# Patient Record
Sex: FEMALE | Race: WHITE | Employment: PART TIME | URBAN - METROPOLITAN AREA
[De-identification: names, ages, dates, MRNs, and addresses within clinical notes are randomized per-mention and may not be internally consistent; named-entity substitution may affect disease eponyms.]

---

## 2017-04-28 ENCOUNTER — OFFICE VISIT (OUTPATIENT)
Dept: INTERNAL MEDICINE CLINIC | Age: 67
End: 2017-04-28

## 2017-04-28 VITALS
HEIGHT: 58 IN | DIASTOLIC BLOOD PRESSURE: 64 MMHG | SYSTOLIC BLOOD PRESSURE: 122 MMHG | TEMPERATURE: 98 F | RESPIRATION RATE: 16 BRPM | BODY MASS INDEX: 27.5 KG/M2 | OXYGEN SATURATION: 97 % | HEART RATE: 61 BPM | WEIGHT: 131 LBS

## 2017-04-28 DIAGNOSIS — I10 ESSENTIAL HYPERTENSION: ICD-10-CM

## 2017-04-28 DIAGNOSIS — Z79.4 TYPE 2 DIABETES MELLITUS WITHOUT COMPLICATION, WITH LONG-TERM CURRENT USE OF INSULIN (HCC): ICD-10-CM

## 2017-04-28 DIAGNOSIS — E11.9 TYPE 2 DIABETES MELLITUS WITHOUT COMPLICATION, WITH LONG-TERM CURRENT USE OF INSULIN (HCC): ICD-10-CM

## 2017-04-28 DIAGNOSIS — Z71.89 ADVANCE CARE PLANNING: ICD-10-CM

## 2017-04-28 DIAGNOSIS — E78.2 MIXED HYPERLIPIDEMIA: ICD-10-CM

## 2017-04-28 DIAGNOSIS — E55.9 VITAMIN D DEFICIENCY: ICD-10-CM

## 2017-04-28 DIAGNOSIS — Z00.00 MEDICARE WELCOME VISIT: Primary | ICD-10-CM

## 2017-04-28 DIAGNOSIS — Z11.59 NEED FOR HEPATITIS C SCREENING TEST: ICD-10-CM

## 2017-04-28 DIAGNOSIS — M15.9 PRIMARY OSTEOARTHRITIS INVOLVING MULTIPLE JOINTS: ICD-10-CM

## 2017-04-28 DIAGNOSIS — J30.2 SEASONAL ALLERGIC RHINITIS, UNSPECIFIED ALLERGIC RHINITIS TRIGGER: ICD-10-CM

## 2017-04-28 PROBLEM — J30.9 ALLERGIC RHINITIS: Status: ACTIVE | Noted: 2017-04-28

## 2017-04-28 RX ORDER — LANCETS
EACH MISCELLANEOUS
Qty: 200 EACH | Refills: 3 | Status: SHIPPED | OUTPATIENT
Start: 2017-04-28 | End: 2018-02-21 | Stop reason: SDUPTHER

## 2017-04-28 RX ORDER — ASPIRIN 325 MG
325 TABLET ORAL DAILY
Qty: 30 TAB | Refills: 5 | Status: SHIPPED | OUTPATIENT
Start: 2017-04-28 | End: 2019-07-31

## 2017-04-28 RX ORDER — CETIRIZINE HCL 10 MG
TABLET ORAL
COMMUNITY
End: 2017-04-28 | Stop reason: SDUPTHER

## 2017-04-28 RX ORDER — ASPIRIN 325 MG
325 TABLET ORAL DAILY
COMMUNITY
End: 2017-04-28 | Stop reason: SDUPTHER

## 2017-04-28 RX ORDER — BLOOD SUGAR DIAGNOSTIC
STRIP MISCELLANEOUS
COMMUNITY
Start: 2017-03-28 | End: 2017-04-28 | Stop reason: SDUPTHER

## 2017-04-28 RX ORDER — LOSARTAN POTASSIUM 50 MG/1
TABLET ORAL
Refills: 2 | COMMUNITY
Start: 2017-03-20 | End: 2017-04-28 | Stop reason: SDUPTHER

## 2017-04-28 RX ORDER — LOSARTAN POTASSIUM 50 MG/1
TABLET ORAL
Qty: 30 TAB | Refills: 5 | Status: SHIPPED | OUTPATIENT
Start: 2017-04-28 | End: 2017-04-28 | Stop reason: SDUPTHER

## 2017-04-28 RX ORDER — BLOOD SUGAR DIAGNOSTIC
STRIP MISCELLANEOUS
Qty: 200 STRIP | Refills: 3 | Status: SHIPPED | OUTPATIENT
Start: 2017-04-28 | End: 2018-03-07 | Stop reason: DRUGHIGH

## 2017-04-28 RX ORDER — MELOXICAM 7.5 MG/1
7.5 TABLET ORAL DAILY
Qty: 90 TAB | Refills: 0 | Status: SHIPPED | OUTPATIENT
Start: 2017-04-28 | End: 2017-05-04 | Stop reason: ALTCHOICE

## 2017-04-28 RX ORDER — CETIRIZINE HCL 10 MG
10 TABLET ORAL DAILY
Qty: 30 TAB | Refills: 5 | Status: SHIPPED | OUTPATIENT
Start: 2017-04-28 | End: 2017-04-28 | Stop reason: SDUPTHER

## 2017-04-28 RX ORDER — ROSUVASTATIN CALCIUM 20 MG/1
20 TABLET, COATED ORAL
COMMUNITY
End: 2017-04-28 | Stop reason: SDUPTHER

## 2017-04-28 RX ORDER — LOSARTAN POTASSIUM 50 MG/1
TABLET ORAL
Qty: 90 TAB | Refills: 3 | Status: SHIPPED | OUTPATIENT
Start: 2017-04-28 | End: 2017-08-16 | Stop reason: SDUPTHER

## 2017-04-28 RX ORDER — ROSUVASTATIN CALCIUM 20 MG/1
20 TABLET, COATED ORAL
Qty: 90 TAB | Refills: 3 | Status: SHIPPED | OUTPATIENT
Start: 2017-04-28 | End: 2018-04-23 | Stop reason: SDUPTHER

## 2017-04-28 RX ORDER — LANCETS
EACH MISCELLANEOUS
COMMUNITY
Start: 2017-03-28 | End: 2017-04-28 | Stop reason: SDUPTHER

## 2017-04-28 RX ORDER — SYRINGE-NEEDLE,INSULIN,0.5 ML 27GX1/2"
SYRINGE, EMPTY DISPOSABLE MISCELLANEOUS
Qty: 200 SYRINGE | Refills: 3 | Status: SHIPPED | OUTPATIENT
Start: 2017-04-28 | End: 2017-05-04 | Stop reason: SDUPTHER

## 2017-04-28 RX ORDER — LANCETS
EACH MISCELLANEOUS
Qty: 100 EACH | Refills: 5 | Status: SHIPPED | OUTPATIENT
Start: 2017-04-28 | End: 2017-04-28 | Stop reason: SDUPTHER

## 2017-04-28 RX ORDER — ROSUVASTATIN CALCIUM 20 MG/1
20 TABLET, COATED ORAL
Qty: 30 TAB | Refills: 5 | Status: SHIPPED | OUTPATIENT
Start: 2017-04-28 | End: 2017-04-28 | Stop reason: SDUPTHER

## 2017-04-28 RX ORDER — CETIRIZINE HCL 10 MG
10 TABLET ORAL DAILY
Qty: 90 TAB | Refills: 3 | Status: SHIPPED | OUTPATIENT
Start: 2017-04-28 | End: 2017-11-01 | Stop reason: SDUPTHER

## 2017-04-28 RX ORDER — BLOOD SUGAR DIAGNOSTIC
STRIP MISCELLANEOUS
Qty: 100 STRIP | Refills: 5 | Status: SHIPPED | OUTPATIENT
Start: 2017-04-28 | End: 2017-04-28 | Stop reason: SDUPTHER

## 2017-04-28 RX ORDER — BLOOD-GLUCOSE METER
EACH MISCELLANEOUS
COMMUNITY
Start: 2017-03-28 | End: 2020-10-21

## 2017-04-28 NOTE — PROGRESS NOTES
Reviewed record  In preparation for visit and have obtained necessary documentation. 1. Have you been to the ER, urgent care clinic since your last visit? Hospitalized since your last visit?no  2. Have you seen or consulted any other health care providers outside of the [address] [place] [address] since your last visit? Include any pap smears or colon screening. Previously saw Dr Maria De Jesus Orellana  Patient does not currently have advance directives. Patient given information on advance directives and brochure and printed blank advance directive form made available to patient. Patient is also asked to make copy of directives available to this office for our/[facility] records once advanced directives are completed. Declines FIT test/colon,PCV13,zoster, mammogram and does not get the flu shot. Last eye exam with Pillo Coronado the [facility].

## 2017-04-28 NOTE — PATIENT INSTRUCTIONS
Schedule of Personalized Health Plan    The best way to stay healthy is to live a healthy lifestyle. A healthy lifestyle includes regular exercise, eating a well-balanced diet, keeping a healthy weight and not smoking. Regular physical exams and screening tests are another important way to take care of yourself. Preventive exams provided by health care providers can find health problems early when treatment works best and can keep you from getting certain diseases or illnesses. Preventive services include exams, lab tests, screenings, shots, monitoring and information to help you take care of your own health. All people over 65 should have a pneumonia shot. Pneumonia shots are usually only needed once in a lifetime unless your doctor decides differently. All people over 65 should have a yearly flu shot. People over 65 are at medium to high risk for Hepatitis B. Three shots are needed for complete protection. In addition to your physical exam, some screening tests are recommended:    Bone mass measurement (dexa scan) is recommended every two years  Diabetes Mellitus screening is recommended every year. Glaucoma is an eye disease caused by high pressure in the eye. An eye exam is recommended every year. Cardiovascular screening tests that check your cholesterol and other blood fat (lipid) levels are recommended every five years. Colorectal Cancer screening tests help to find pre-cancerous polyps (growths in the colon) so they can be removed before they turn into cancer. Tests ordered for screening depend on your personal and family history risk factors.     Screening for Breast Cancer is recommended yearly with a mammogram.    Screening for Cervical Cancer is recommended every two years (annually for certain risk factors, such as previous history of STD or abnormal PAP in past 7 years), with a Pelvic Exam with PAP    Here is a list of your current Health Maintenance items with a due date:  Health Maintenance   Topic Date Due    Hepatitis C Screening  1950    BREAST CANCER SCRN MAMMOGRAM  09/26/2001    FOBT Q 1 YEAR AGE 50-75  09/26/2001    GLAUCOMA SCREENING Q2Y  09/26/2016    OSTEOPOROSIS SCREENING (DEXA)  09/26/2016    Pneumococcal 65+ Low/Medium Risk (1 of 2 - PCV13) 09/26/2016    MEDICARE YEARLY EXAM  09/26/2016    DTaP/Tdap/Td series (2 - Td) 04/27/2027    ZOSTER VACCINE AGE 60>  Addressed    INFLUENZA AGE 9 TO ADULT  Addressed

## 2017-04-28 NOTE — PROGRESS NOTES
This is a \"Welcome to [employer]"  Initial Preventive Physical Examination (IPPE) providing Personalized Prevention Plan Services (Performed in the first 12 months of enrollment)    I have reviewed the patient's medical history in detail and updated the computerized patient record. History   Francis Chou is a 73 y.o. female. Presents to [facility] and for [facility] Annual Wellness Visit. Previous PCP was Dr. Sergio Limon; changed due to insurance. Seen also at [facility] the [facility]. She has insulin-dependent type 2 DM, HTN, hyperlipidemia, and seasonal allergic rhinitis. She complains of pain all over, especially at hands, arms, shoulders, low back, legs, knees, feet. Has painful  hammertoes and bunions. Denies numbness, tingling, or burning at feet or hands. She was prescribed ibuprofen 600 mg but was scared to take it due after she read about the side effects. Feels a little sad because her brother is in the hospital in [place] for CHF. Needs refills on all her medications to be sent to [facility]. States she has to change to Novolin due to formulary change. Endocrine Review  She is seen for diabetes. Was diagnosed in 1976. Reports her last A1c in 1/17 was about 8%. Since last visit she reports no polyuria or polydipsia, infrequent hypoglycemia. Home glucose monitoring: is performed regularly. Checks BS 2 times a day. Brought in BS log. She reports medication compliance: compliant most of the time. Medication side effects: none. Diabetic diet compliance: compliant most of the time. Lab review: orders written for new lab studies as appropriate; see orders. Eye exam: no.      Cardiovascular Review  The patient has hypertension and hyperlipidemia. She reports taking medications as instructed, no medication side effects noted.   Diet and Lifestyle: generally follows a low fat low cholesterol diet, generally follows a low sodium diet, stays active in the day but no formal exercise. Lab review: orders written for new lab studies as appropriate; see orders. Soc Hx  . Has 1 son; no grandchildren. Lives with her son in a trailer home. Works at [employer]. Usually works on Wednesdays, Saturdays, and Sundays. Does not drive; walks to work. Never smoker. Denies alcohol or recreational drug use. Drinks a cup of coffee 2 times a day.     Health Maintenance  Flu vaccine: made her sick before; declined     Pneumonia vaccine: states she had long time ago; declined    Tetanus vaccine: unknown; no indicated     Zoster vaccine: never had; declined  Pap smear: unknown; declined  Colonoscopy: due for this; declined but will discuss with her at next clinic visit  Mammogram: unknown; due for this  Bone density:  due for this  Eye exam: Melissa Leung in 1/17, got new glasses  Foot exam: due for this  Lipids: will check today (fasting)  A1c: will check today  Advanced Directives: dicussed, given information  End of Life: discussed, given information    ROS  Constitutional: negative for fevers, chills, night sweats  ENT:   negative for sore throat, nasal congestion, ear pains  Respiratory:  negative for cough, dyspnea  CV:   negative for chest pain, palpitations, lower extremity edema  GI:   negative for heartburn, abd pain, nausea, vomiting, diarrhea, constipation  Genitourinary: negative for frequency, dysuria and hematuria  Integument:  negative for rash and pruritus  Musculoskel: negative for muscle weakness  Neurological:  negative for headaches, dizziness, gait problems  Behavl/Psych: negative for feelings of anxiety, depression, mood change    Past Medical History:   Diagnosis Date    Arthritis     Diabetes ([facility] [address]     Hypercholesterolemia     Hypertension       Past Surgical History:   Procedure Laterality Date    EYE SURG ANT SGMT PROC UNLISTED      HX TUBAL LIGATION       Current Outpatient Prescriptions   Medication Sig Dispense Refill    ACCU-CHEK SOFTCLIX LANCETS misc Use to test blood sugar twice a day for Dx: E11.9      ACCU-CHEK CAROL PLUS METER misc       losartan (COZAAR) 50 mg tablet TAKE 1 TABLET EVERY DAY  2    ACCU-CHEK CAROL PLUS TEST STRP strip Testing blood sugar twice a day Dx E11.9      aspirin (ASPIRIN) 325 mg tablet Take 325 mg by mouth daily.  rosuvastatin (CRESTOR) 20 mg tablet Take 20 mg by mouth nightly.  cetirizine (ZYRTEC) 10 mg tablet Take  by mouth.  insulin NPH/insulin regular (NOVOLIN 70/30, HUMULIN 70/30) 100 unit/mL (70-30) injection by SubCUTAneous route. Sliding scale:  Am:  BS 50-99 16 units,  -299 20 units,   or > 22 units  PM:  50-99 12 units,  100-299 16 units,  300 or > 180 units      SYRINGE-NEEDLE,INSULIN,0.5 ML (B-D INSULIN SYRINGE LO-DOSE) by Does Not Apply route. Use to inject insulin twice a day for      insulin nph-regular human rec (HUMULIN 70/30 PEN) 100 unit/mL (70-30) flexpen by SubCUTAneous route.  olmesartan (BENICAR) 40 mg tablet Take  by mouth daily.  atorvastatin (LIPITOR) 40 mg tablet Take  by mouth daily.        Allergies   Allergen Reactions    Compazine [Prochlorperazine Edisylate] Other (comments)     Tongue swelling, drooling     Family History   Problem Relation Age of Onset    Cancer Mother     Diabetes Sister     Heart Disease Sister     Diabetes Brother     Heart Disease Brother     Diabetes Maternal Grandmother     Diabetes Paternal Grandmother      Social History   Substance Use Topics    Smoking status: Never Smoker    Smokeless tobacco: Not on file    Alcohol use No     Diet, Lifestyle: generally follows a low fat low cholesterol diet, generally follows a low sodium diet    Exercise level: moderately active, does much walking    Depression Risk Screen     PHQ 2 / 9, over the last two weeks 4/28/2017   Little interest or pleasure in doing things Not at all   Feeling down, depressed or hopeless Several days   Total Score PHQ 2 1     Alcohol Risk Screen   On any occasion during the past 3 months, have you had more than 3 drinks containing alcohol? No    Do you average more than 7 drinks per week? No    Functional Ability and Level of Safety     Hearing Loss   normal-to-mild    Activities of Daily Living   Self-care     Fall Risk Screen     Fall Risk Assessment, last 12 mths 4/28/2017   Able to walk? Yes   Fall in past 12 months? No     Abuse Screen   Patient is not abused    Review of Systems   Pertinent items are noted in HPI. Physical Examination     Visit Vitals    /64    Pulse 61    Temp 98 °F (36.7 °C) (Oral)    Resp 16    Ht 4' 10\" (1.473 m)    Wt 131 lb (59.4 kg)    SpO2 97%    BMI 27.38 kg/m2       General: Well-developed and well-nourished, no distress. HEENT:  Head normocephalic/atraumatic, no scleral icterus  Neck: Supple. No carotid bruits, JVD, lymphadenopathy, or thyromegaly. Lungs:  Clear to ausculation bilaterally. Good air movement. Heart:  Regular rate and rhythm, normal S1 and S2, no murmur, gallop, or rub  Abdomen: Soft, non-distended, normal bowel sounds, tenderness at epigastrium, no guarding, masses, rebound tenderness, or HSM. Extremities: No clubbing, cyanosis, or edema. Normal ROM at both shoulder and knees; mild crepitus at both knees (R > L)  Neurological: Alert and oriented. Non-focal exam.  Psychiatric: Normal mood and affect. Behavior is normal.         Patient Care Team:  Sharmin Red MD as PCP - General (Internal Medicine)     End-of-life planning  Advanced Directive discussed and documented: YES    Assessment/Plan   Education and counseling provided:  Are appropriate based on today's review and evaluation  End-of-Life planning (with patient's consent)  Pneumococcal Vaccine  Screening Mammography  Screening Pap and pelvic (covered once every 2 years)  Colorectal cancer screening tests  Cardiovascular screening blood test  Bone mass measurement (DEXA)  Diabetes screening test         ICD-10-CM ICD-9-CM    1.  Medicare welcome visit Z00.00 V70.0    2. Type 2 diabetes mellitus without complication, with long-term current use of insulin (HCC) E11.9 250.00 HEMOGLOBIN A1C WITH EAG    Z79.4 V58.67 ACCU-CHEK SOFTCLIX LANCETS misc      ACCU-CHEK CAROL PLUS TEST STRP strip      aspirin (ASPIRIN) 325 mg tablet      insulin NPH/insulin regular (NOVOLIN 70/30, HUMULIN 70/30) 100 unit/mL (70-30) injection   3. Essential hypertension N56 638.3 METABOLIC PANEL, COMPREHENSIVE      CBC WITH AUTOMATED DIFF      losartan (COZAAR) 50 mg tablet   4. Mixed hyperlipidemia E78.2 272.2 LIPID PANEL      TSH AND FREE T4      rosuvastatin (CRESTOR) 20 mg tablet   5. Seasonal allergic rhinitis, unspecified allergic rhinitis trigger J30.2 477.9 cetirizine (ZYRTEC) 10 mg tablet   6. Primary osteoarthritis involving multiple joints M15.0 715.09 meloxicam (MOBIC) 7.5 mg tablet   7. Vitamin D deficiency E55.9 268.9 VITAMIN D, 25 HYDROXY   8. Need for hepatitis C screening test Z11.59 V73.89 HEPATITIS C AB   9. Advance care planning Z71.89 V65.49        Jenaro Young was seen today to establish care and for [facility] Visit. She declined all immunizations. Diagnoses and all orders for this visit:    Medicare welcome visit  Medical records from previous PCP's/clinics will be requested. Type 2 diabetes mellitus without complication, with long-term current use of insulin (HCC)  -     HEMOGLOBIN A1C WITH EAG  -     ACCU-CHEK SOFTCLIX LANCETS misc; Use to test blood sugar twice a day for Dx: E11.9  -     ACCU-CHEK CAROL PLUS TEST STRP strip; Testing blood sugar twice a day Dx E11.9  -     Refill aspirin (ASPIRIN) 325 mg tablet; Take 1 Tab by mouth daily.   -     Refill insulin NPH/insulin regular (NOVOLIN 70/30, HUMULIN 70/30) 100 unit/mL (70-30) injection; Sliding scale:  Am:  BS 50-99 16 units,  -299 20 units,   or > 22 units  PM:  50-99 12 units,  100-299 16 units,  300 or > 180 units    Essential hypertension  - METABOLIC PANEL, COMPREHENSIVE  -     CBC WITH AUTOMATED DIFF  -     Refill losartan (COZAAR) 50 mg tablet; TAKE 1 TABLET EVERY DAY  Indications: hypertension    Mixed hyperlipidemia  -     LIPID PANEL  -     TSH AND FREE T4  -     Refill rosuvastatin (CRESTOR) 20 mg tablet; Take 1 Tab by mouth nightly. Indications: hyperlipidemia    Seasonal allergic rhinitis, unspecified allergic rhinitis trigger  -     Refill cetirizine (ZYRTEC) 10 mg tablet; Take 1 Tab by mouth daily. Indications: ALLERGIC RHINITIS    Primary osteoarthritis involving multiple joints  -     Start meloxicam (MOBIC) 7.5 mg tablet; Take 1 Tab by mouth daily. Take with food. Indications: OSTEOARTHRITIS    Vitamin D deficiency  -     VITAMIN D, 25 HYDROXY    Need for hepatitis C screening test  -     HEPATITIS C AB    Advance care planning    Greater than 40 mins direct face-to-face time spent with patient. Greater than 50% of time spent on counseling and coordination of care. Follow-up Disposition:  Return in about 1 month (around 5/28/2017), or if symptoms worsen or fail to improve, for DM, OA. .    I have discussed the diagnosis with the patient and the intended plan as seen in the above orders. Patient is in agreement. The patient has received an after-visit summary and questions were answered concerning future plans. I have discussed medication side effects and warnings with the patient as well.

## 2017-04-28 NOTE — MR AVS SNAPSHOT
Visit Information Date & Time Provider Department Dept. Phone Encounter #  
 4/28/2017  8:30 AM Morenita Poon Jackie Camara [phone] [id number] Follow-up Instructions Return in about 1 month (around 5/28/2017), or if symptoms worsen or fail to improve, for DM, OA. Upcoming Health Maintenance Date Due Hepatitis C Screening 1950 BREAST CANCER SCRN MAMMOGRAM 9/26/2001 FOBT Q 1 YEAR AGE 50-75 9/26/2001 GLAUCOMA SCREENING Q2Y 9/26/2016 OSTEOPOROSIS SCREENING (DEXA) 9/26/2016 Pneumococcal 65+ Low/Medium Risk (1 of 2 - PCV13) 9/26/2016 MEDICARE YEARLY EXAM 9/26/2016 DTaP/Tdap/Td series (2 - Td) 4/27/2027 Allergies as of 4/28/2017  Review Complete On: 4/28/2017 By: Morenita Poon MD  
  
 Severity Noted Reaction Type Reactions Compazine [Prochlorperazine Edisylate]  04/12/2010    Other (comments) Tongue swelling, drooling Current Immunizations  Reviewed on 4/13/2010 Name Date  
 TD Vaccine 4/13/2010  2:21 AM  
  
 Not reviewed this visit You Were Diagnosed With   
  
 Codes Comments Medicare welcome visit    -  Primary ICD-10-CM: Z00.00 ICD-9-CM: V70.0 Type 2 diabetes mellitus without complication, with long-term current use of insulin (HCC)     ICD-10-CM: E11.9, Z79.4 ICD-9-CM: 250.00, V58.67 Essential hypertension     ICD-10-CM: I10 
ICD-9-CM: 401.9 Mixed hyperlipidemia     ICD-10-CM: E78.2 ICD-9-CM: 272.2 Seasonal allergic rhinitis, unspecified allergic rhinitis trigger     ICD-10-CM: J30.2 ICD-9-CM: 477.9 Primary osteoarthritis involving multiple joints     ICD-10-CM: M15.0 ICD-9-CM: 715.09 Vitamin D deficiency     ICD-10-CM: E55.9 ICD-9-CM: 268.9 Need for hepatitis C screening test     ICD-10-CM: Z11.59 
ICD-9-CM: V73.89 Advance care planning     ICD-10-CM: Z71.89 ICD-9-CM: V65.49 Vitals BP Pulse Temp Resp Height(growth percentile) Weight(growth percentile) 122/64 61 98 °F (36.7 °C) (Oral) 16 4' 10\" (1.473 m) 131 lb (59.4 kg) SpO2 BMI OB Status Smoking Status 97% 27.38 kg/m2 Postmenopausal Never Smoker Vitals History BMI and BSA Data Body Mass Index Body Surface Area  
 27.38 kg/m 2 1.56 m 2 Preferred Pharmacy Pharmacy Name Phone [id number] [address] [place] [address] [phone] Your Updated Medication List  
  
   
This list is accurate as of: 4/28/17  9:28 AM.  Always use your most recent med list.  
  
  
  
  
 [address] Generic drug:  Blood-Glucose Meter ACCU-CHEK CAROL PLUS TEST STRP strip Generic drug:  glucose blood VI test strips Testing blood sugar twice a day Dx E11.9 [address] Generic drug:  Lancets Use to test blood sugar twice a day for Dx: E11.9  
  
 aspirin 325 mg tablet Commonly known as:  ASPIRIN Take 325 mg by mouth daily. B-D INSULIN SYRINGE LO-DOSE  
by Does Not Apply route. Use to inject insulin twice a day for BENICAR 40 mg tablet Generic drug:  olmesartan Take  by mouth daily. cetirizine 10 mg tablet Commonly known as:  ZYRTEC Take  by mouth. CRESTOR 20 mg tablet Generic drug:  rosuvastatin Take 20 mg by mouth nightly. * HumuLIN 70/30 Pen 100 unit/mL (70-30) Inpn Generic drug:  insulin nph-regular human rec  
by SubCUTAneous route. * insulin NPH/insulin regular 100 unit/mL (70-30) injection Commonly known as:  NOVOLIN 70/30, HUMULIN 70/30  
by SubCUTAneous route. Sliding scale: Am: BS 50-99 16 units, -299 20 units,  or > 22 units PM: 50-99 12 units, 100-299 16 units, 300 or > 180 units LIPITOR 40 mg tablet Generic drug:  atorvastatin Take  by mouth daily. losartan 50 mg tablet Commonly known as:  COZAAR  
TAKE 1 TABLET EVERY DAY  
  
 * Notice:   This list has 2 medication(s) that are the same as other medications prescribed for you. Read the directions carefully, and ask your doctor or other care provider to review them with you. We Performed the Following CBC WITH AUTOMATED DIFF [39161 CPT(R)] HEMOGLOBIN A1C WITH EAG [91680 CPT(R)] HEPATITIS C AB [29958 CPT(R)] LIPID PANEL [06582 CPT(R)] METABOLIC PANEL, COMPREHENSIVE [79070 CPT(R)] TSH AND FREE T4 [39398 CPT(R)] VITAMIN D, 25 HYDROXY [id number] CPT(R)] Follow-up Instructions Return in about 1 month (around 5/28/2017), or if symptoms worsen or fail to improve, for DM, OA. Patient Instructions Schedule of Personalized Health Plan The best way to stay healthy is to live a healthy lifestyle. A healthy lifestyle includes regular exercise, eating a well-balanced diet, keeping a healthy weight and not smoking. Regular physical exams and screening tests are another important way to take care of yourself. Preventive exams provided by health care providers can find health problems early when treatment works best and can keep you from getting certain diseases or illnesses. Preventive services include exams, lab tests, screenings, shots, monitoring and information to help you take care of your own health. All people over 65 should have a pneumonia shot. Pneumonia shots are usually only needed once in a lifetime unless your doctor decides differently. All people over 65 should have a yearly flu shot. People over 65 are at medium to high risk for Hepatitis B. Three shots are needed for complete protection. In addition to your physical exam, some screening tests are recommended: 
 
Bone mass measurement (dexa scan) is recommended every two years Diabetes Mellitus screening is recommended every year. Glaucoma is an eye disease caused by high pressure in the eye. An eye exam is recommended every year.   
 
Cardiovascular screening tests that check your cholesterol and other blood fat (lipid) levels are recommended every five years. Colorectal Cancer screening tests help to find pre-cancerous polyps (growths in the colon) so they can be removed before they turn into cancer. Tests ordered for screening depend on your personal and family history risk factors. Screening for Breast Cancer is recommended yearly with a mammogram. 
 
Screening for Cervical Cancer is recommended every two years (annually for certain risk factors, such as previous history of STD or abnormal PAP in past 7 years), with a Pelvic Exam with PAP Here is a list of your current Health Maintenance items with a due date: 
Health Maintenance Topic Date Due  
 Hepatitis C Screening  1950  BREAST CANCER SCRN MAMMOGRAM  09/26/2001  
 FOBT Q 1 YEAR AGE 50-75  09/26/2001  GLAUCOMA SCREENING Q2Y  09/26/2016  
 OSTEOPOROSIS SCREENING (DEXA)  09/26/2016  Pneumococcal 65+ Low/Medium Risk (1 of 2 - PCV13) 09/26/2016  MEDICARE YEARLY EXAM  09/26/2016  
 DTaP/Tdap/Td series (2 - Td) 04/27/2027  ZOSTER VACCINE AGE 60>  Addressed  INFLUENZA AGE 9 TO ADULT  Addressed Introducing [facility]! [facility] introduces Stamped patient portal. Now you can access parts of your medical record, email your doctor's office, and request medication refills online. 1. In your internet browser, go to [email or web address] "[email or web address] 2. Click on the First Time User? Click Here link in the Sign In box. You will see the New Member Sign Up page. 3. Enter your Stamped Access Code exactly as it appears below. You will not need to use this code after youve completed the sign-up process. If you do not sign up before the expiration date, you must request a new code. · Stamped Access Code: [email or web address] Expires: 7/27/2017  9:28 AM 
 
4. Enter the last four digits of your Social Security Number (xxxx) and Date of Birth (mm/dd/yyyy) as indicated and click Submit.  You will be taken to the next sign-up page. 5. Create a [employer] ID. This will be your [employer] login ID and cannot be changed, so think of one that is secure and easy to remember. 6. Create a [employer] password. You can change your password at any time. 7. Enter your Password Reset Question and Answer. This can be used at a later time if you forget your password. 8. Enter your e-mail address. You will receive e-mail notification when new information is available in [address]. 9. Click Sign Up. You can now view and download portions of your medical record. 10. Click the Download Summary menu link to download a portable copy of your medical information. If you have questions, please visit the Frequently Asked Questions section of the [employer] website. Remember, [employer] is NOT to be used for urgent needs. For medical emergencies, dial 911. Now available from your iPhone and Android! Please provide this summary of care documentation to your next provider. Your primary care clinician is listed as Lorenzo Albright. If you have any questions after today's visit, please call [phone].

## 2017-04-28 NOTE — ACP (ADVANCE CARE PLANNING)

## 2017-04-28 NOTE — PROGRESS NOTES
This is an Initial Medicare Annual Wellness Exam (AWV) (Performed 12 months after IPPE or effective date of Medicare Part B enrollment, Once in a lifetime)    I have reviewed the patient's medical history in detail and updated the computerized patient record. History   Courtney Littlejohn is a 73 y.o. female. Presents to [facility] and for [facility] Annual Wellness Visit. Previous PCP was Dr. Tangela Farrell; changed due to insurance. Seen also at [facility] the [facility]. She has insulin-dependent type 2 DM, HTN, hyperlipidemia, and seasonal allergic rhinitis. She complains of pain all over, especially at hands, arms, shoulders, low back, legs, knees, feet. Has painful  hammertoes and bunions. Denies numbness, tingling, or burning at feet or hands. She was prescribed ibuprofen 600 mg but was scared to take it due after she read about the side effects. Feels a little sad because her brother is in the hospital in [place] for CHF. Needs refills on all her medications to be sent to [facility]. States she has to change to Novolovolin due to formulary change. Endocrine Review  She is seen for diabetes. Was diagnosed in 1975. Reports her last A1c in 1/17 was about 8%. Since last visit she reports no polyuria or polydipsia, infrequent hypoglycemia. Home glucose monitoring: is performed regularly. Checks BS 2 times a day. Brought in BS log. She reports medication compliance: compliant most of the time. Medication side effects: none. Diabetic diet compliance: compliant most of the time. Lab review: orders written for new lab studies as appropriate; see orders. Eye exam: no.      Cardiovascular Review  The patient has hypertension and hyperlipidemia. She reports taking medications as instructed, no medication side effects noted. Diet and Lifestyle: generally follows a low fat low cholesterol diet, generally follows a low sodium diet, stays active in the day but no formal exercise. Lab review: orders written for new lab studies as appropriate; see orders. Soc Hx  . Has 1 son; no grandchildren. Lives with her son in a trailer home. Works at [employer]. Usually works on [address], Saturdays, and Sundays. Never smoker. Denies alcohol or recreational drug use. Drinks a cup of coffee 2 times a day. Health Maintenance  Flu vaccine: made her sick before; declined     Pneumonia vaccine: states she had long time ago; declined    Tetanus vaccine: unknown; no indicated     Zoster vaccine: never had; declined  Pap smear: unknown; declined  Colonoscopy: due for this; declined but will discuss with her at next clinic visit  Mammogram: unknown; due for this  Bone density:  due for this  Eye exam: [facility] in 1/17, got new glassess  Foot exam: due for this  Lipids: will check today (fasting)  A1c: will check today  Advanced Directives: dicussed, given infomation  End of Life: discussed, given information       Past Medical History:   Diagnosis Date    Arthritis     Diabetes ([facility] [address]     Hypercholesterolemia     Hypertension       Past Surgical History:   Procedure Laterality Date    EYE SURG ANT SGMT PROC UNLISTED      HX TUBAL LIGATION       Current Outpatient Prescriptions   Medication Sig Dispense Refill    ACCU-CHEK SOFTCLIX LANCETS misc       ACCU-CHEK CAROL PLUS METER misc       losartan (COZAAR) 50 mg tablet TAKE 1 TABLET EVERY DAY  2    ACCU-CHEK CAROL PLUS TEST STRP strip Testing blood sugar twice a day      aspirin (ASPIRIN) 325 mg tablet Take 325 mg by mouth daily.  rosuvastatin (CRESTOR) 20 mg tablet Take 20 mg by mouth nightly.  cetirizine (ZYRTEC) 10 mg tablet Take  by mouth.  insulin NPH/insulin regular (NOVOLIN 70/30, HUMULIN 70/30) 100 unit/mL (70-30) injection by SubCUTAneous route.  Sliding scale:  Am:  BS 50-99 16 units,  -299 20 units,   or > 22 units  PM:  50-99 12 units,  100-299 16 units,  300 or > 180 units      insulin nph-regular human rec (HUMULIN 70/30 PEN) 100 unit/mL (70-30) flexpen by SubCUTAneous route.  olmesartan (BENICAR) 40 mg tablet Take  by mouth daily.  atorvastatin (LIPITOR) 40 mg tablet Take  by mouth daily. Allergies   Allergen Reactions    Compazine [Prochlorperazine Edisylate] Other (comments)     Tongue swelling, drooling     Family History   Problem Relation Age of Onset    Cancer Mother     Diabetes Sister     Heart Disease Sister     Diabetes Brother     Heart Disease Brother     Diabetes Maternal Grandmother     Diabetes Paternal Grandmother      Social History   Substance Use Topics    Smoking status: Never Smoker    Smokeless tobacco: Not on file    Alcohol use No     Patient Active Problem List   Diagnosis Code    Type 2 diabetes mellitus without complication, with long-term current use of insulin ([facility] E11.9, Z79.4    Essential hypertension I10    Mixed hyperlipidemia E78.2    Allergic rhinitis J30.9       Depression Risk Factor Screening:     PHQ 2 / 9, over the last two weeks 2017   Little interest or pleasure in doing things Not at all   Feeling down, depressed or hopeless Several days   Total Score PHQ 2 1     Alcohol Risk Factor Screening:   {screenin}    Functional Ability and Level of Safety:     Hearing Loss   {Desc; hearing loss:49017}    Activities of Daily Living   {ADL:39708::\"Self-care\"}. Requires assistance with: {ADLs:27837::\"no ADLs\"}    Fall Risk     Fall Risk Assessment, last 12 mths 2017   Able to walk? Yes   Fall in past 12 months?  No     Abuse Screen   {Abuse Screen:::\"Patient is not abused\"}    Review of Systems   {ros - complete:29170}    Physical Examination     Evaluation of Cognitive Function:  Mood/affect:  {mood:51566}  Appearance: {APPEARANCE:79974::\"age appropriate\"}  Family member/caregiver input: ***    Visit Vitals    /64    Pulse 61    Temp 98 °F (36.7 °C) (Oral)    Resp 16    Ht 4' 10\" (1.473 m)    Wt 131 lb (59.4 kg)    SpO2 97%    BMI 27.38 kg/m2       General: Alert and oriented, no distress. HEENT: Normocephalic, atraumatic. Conjunctiva clear. Pupils equal, round, reactive to light. Extraocular movements intact. Neck: Supple, no carotid bruits. No thyromegaly or lymphadenopathy  Lungs: Clear to auscultation bilaterally. Good air movement  Chest Wall: No tenderness or deformity. Heart: RRR, normal S1 and S2, no murmur, click, rub, or gallop. Abdomen: Soft, non-tender, non-distended. Bowel sounds normal. No masses. No organomegaly. Extremities: Normal, no clubbing cyanosis, or edema. Pulses: 2+ and symmetric in all extremities. Skin: Color, texture, and turgor normal. No rashes or lesions. Neurological: CNII-XII intact. Normal strength, sensation, and reflexes throughout. Patient Care Team:  Dwight Wayne MD as PCP - General (Internal Medicine)        Advice/Referrals/Counseling   Education and counseling provided:  {Education List, choose as appropriate:83518}    Assessment/Plan   {Assessment and Plan:54087}. Patient seen and had Medicare Annual Wellness Exam; Wellness Schedule printed, reviewed, and given to patient.

## 2017-04-29 LAB
25(OH)D3+25(OH)D2 SERPL-MCNC: 19.2 NG/ML (ref 30–100)
ALBUMIN SERPL-MCNC: 4.7 G/DL (ref 3.6–4.8)
ALBUMIN/GLOB SERPL: 2.2 {RATIO} (ref 1.2–2.2)
ALP SERPL-CCNC: 74 IU/L (ref 39–117)
ALT SERPL-CCNC: 25 IU/L (ref 0–32)
AST SERPL-CCNC: 32 IU/L (ref 0–40)
BASOPHILS # BLD AUTO: 0 X10E3/UL (ref 0–0.2)
BASOPHILS NFR BLD AUTO: 1 %
BILIRUB SERPL-MCNC: 0.7 MG/DL (ref 0–1.2)
BUN SERPL-MCNC: 20 MG/DL (ref 8–27)
BUN/CREAT SERPL: 27 (ref 12–28)
CALCIUM SERPL-MCNC: 9.9 MG/DL (ref 8.7–10.3)
CHLORIDE SERPL-SCNC: 100 MMOL/L (ref 96–106)
CHOLEST SERPL-MCNC: 169 MG/DL (ref 100–199)
CO2 SERPL-SCNC: 25 MMOL/L (ref 18–29)
CREAT SERPL-MCNC: 0.73 MG/DL (ref 0.57–1)
EOSINOPHIL # BLD AUTO: 0.3 X10E3/UL (ref 0–0.4)
EOSINOPHIL NFR BLD AUTO: 5 %
ERYTHROCYTE [DISTWIDTH] IN BLOOD BY AUTOMATED COUNT: 13.8 % (ref 12.3–15.4)
EST. AVERAGE GLUCOSE BLD GHB EST-MCNC: 200 MG/DL
GLOBULIN SER CALC-MCNC: 2.1 G/DL (ref 1.5–4.5)
GLUCOSE SERPL-MCNC: 96 MG/DL (ref 65–99)
HBA1C MFR BLD: 8.6 % (ref 4.8–5.6)
HCT VFR BLD AUTO: 43 % (ref 34–46.6)
HCV AB S/CO SERPL IA: <0.1 S/CO RATIO (ref 0–0.9)
HDLC SERPL-MCNC: 71 MG/DL
HGB BLD-MCNC: 14.4 G/DL (ref 11.1–15.9)
IMM GRANULOCYTES # BLD: 0 X10E3/UL (ref 0–0.1)
IMM GRANULOCYTES NFR BLD: 0 %
INTERPRETATION, 910389: NORMAL
LDLC SERPL CALC-MCNC: 86 MG/DL (ref 0–99)
LYMPHOCYTES # BLD AUTO: 2.5 X10E3/UL (ref 0.7–3.1)
LYMPHOCYTES NFR BLD AUTO: 35 %
Lab: NORMAL
MCH RBC QN AUTO: 29.9 PG (ref 26.6–33)
MCHC RBC AUTO-ENTMCNC: 33.5 G/DL (ref 31.5–35.7)
MCV RBC AUTO: 89 FL (ref 79–97)
MONOCYTES # BLD AUTO: 0.7 X10E3/UL (ref 0.1–0.9)
MONOCYTES NFR BLD AUTO: 10 %
NEUTROPHILS # BLD AUTO: 3.6 X10E3/UL (ref 1.4–7)
NEUTROPHILS NFR BLD AUTO: 49 %
PLATELET # BLD AUTO: 233 X10E3/UL (ref 150–379)
POTASSIUM SERPL-SCNC: 4.2 MMOL/L (ref 3.5–5.2)
PROT SERPL-MCNC: 6.8 G/DL (ref 6–8.5)
RBC # BLD AUTO: 4.81 X10E6/UL (ref 3.77–5.28)
SODIUM SERPL-SCNC: 143 MMOL/L (ref 134–144)
T4 FREE SERPL-MCNC: 1.09 NG/DL (ref 0.82–1.77)
TRIGL SERPL-MCNC: 59 MG/DL (ref 0–149)
TSH SERPL DL<=0.005 MIU/L-ACNC: 1.73 UIU/ML (ref 0.45–4.5)
VLDLC SERPL CALC-MCNC: 12 MG/DL (ref 5–40)
WBC # BLD AUTO: 7.1 X10E3/UL (ref 3.4–10.8)

## 2017-05-01 NOTE — PROGRESS NOTES
Your labs showed normal kidney and liver tests, blood counts, thyroid, cholesterol, and hepatitis C screening test. Your vitamin D level was low. Vitamin D is important for the bones, muscles, and heart. Dr. Sonia Leon has sent a prescription to your pharmacy for once weekly vitamin D tablets. Your A1c was 8.6%, meaning your average blood sugar over the past 3 months was 200, which is high. Your goal A1c should be less than 7.0% and your average blood sugar less than 140. Dr. Sonia Leon would like you to start taking your insulin 70/30 as 20 units in the morning and 16 units in the evening. It is also okay to follow the sliding scale. Check your blood sugars twice daily and bring in a record with you to your next clinic visit.

## 2017-05-04 DIAGNOSIS — E11.9 TYPE 2 DIABETES MELLITUS WITHOUT COMPLICATION, WITH LONG-TERM CURRENT USE OF INSULIN (HCC): ICD-10-CM

## 2017-05-04 DIAGNOSIS — E55.9 VITAMIN D DEFICIENCY: Primary | ICD-10-CM

## 2017-05-04 DIAGNOSIS — Z79.4 TYPE 2 DIABETES MELLITUS WITHOUT COMPLICATION, WITH LONG-TERM CURRENT USE OF INSULIN (HCC): ICD-10-CM

## 2017-05-04 RX ORDER — NAPROXEN 500 MG/1
500 TABLET ORAL 2 TIMES DAILY WITH MEALS
Qty: 180 TAB | Refills: 1 | Status: SHIPPED | OUTPATIENT
Start: 2017-05-04 | End: 2017-05-10 | Stop reason: ALTCHOICE

## 2017-05-04 RX ORDER — SYRINGE-NEEDLE,INSULIN,0.5 ML 27GX1/2"
SYRINGE, EMPTY DISPOSABLE MISCELLANEOUS
Qty: 200 SYRINGE | Refills: 3 | Status: SHIPPED | OUTPATIENT
Start: 2017-05-04 | End: 2017-05-10 | Stop reason: SDUPTHER

## 2017-05-04 RX ORDER — ERGOCALCIFEROL 1.25 MG/1
50000 CAPSULE ORAL
Qty: 12 CAP | Refills: 2 | Status: SHIPPED | OUTPATIENT
Start: 2017-05-04 | End: 2017-05-10 | Stop reason: SDUPTHER

## 2017-05-04 NOTE — TELEPHONE ENCOUNTER
Pt stated that she also need a prescription for pain medication that has aleve in it sent to [facility] as well.

## 2017-05-10 DIAGNOSIS — G89.29 CHRONIC LEFT SHOULDER PAIN: Primary | ICD-10-CM

## 2017-05-10 DIAGNOSIS — Z79.4 TYPE 2 DIABETES MELLITUS WITHOUT COMPLICATION, WITH LONG-TERM CURRENT USE OF INSULIN (HCC): ICD-10-CM

## 2017-05-10 DIAGNOSIS — E11.9 TYPE 2 DIABETES MELLITUS WITHOUT COMPLICATION, WITH LONG-TERM CURRENT USE OF INSULIN (HCC): ICD-10-CM

## 2017-05-10 DIAGNOSIS — M25.512 CHRONIC LEFT SHOULDER PAIN: Primary | ICD-10-CM

## 2017-05-10 RX ORDER — SYRINGE-NEEDLE,INSULIN,0.5 ML 27GX1/2"
SYRINGE, EMPTY DISPOSABLE MISCELLANEOUS
Qty: 200 SYRINGE | Refills: 3 | Status: SHIPPED | OUTPATIENT
Start: 2017-05-10 | End: 2019-07-31 | Stop reason: ALTCHOICE

## 2017-05-10 RX ORDER — ERGOCALCIFEROL 1.25 MG/1
50000 CAPSULE ORAL
Qty: 12 CAP | Refills: 2 | Status: SHIPPED | OUTPATIENT
Start: 2017-05-10 | End: 2017-11-01 | Stop reason: ALTCHOICE

## 2017-05-10 RX ORDER — MELOXICAM 15 MG/1
15 TABLET ORAL DAILY
Qty: 90 TAB | Refills: 1 | Status: SHIPPED | OUTPATIENT
Start: 2017-05-10 | End: 2017-05-15 | Stop reason: SDUPTHER

## 2017-05-10 NOTE — TELEPHONE ENCOUNTER
Patient reports she can not take naproxen and needs an alternative.  [facility] reports they need orders resent because they did not receive them 5/4/17

## 2017-05-10 NOTE — TELEPHONE ENCOUNTER
----- Message from Joana Dukes sent at 5/9/2017  5:11 PM EDT -----  Regarding: Dr. Oscar Camara  Pt requests insurance authorization for \"Novolin\" 70/30, \"Vitamin D\", and a substitute for \"Naproxen\" as it causes stomach pain and discomfort, [facility], ([phone]). Pt also requests clarification of 05/09/17 call from Maria C concerning pickup of Rx at [facility]. Best contact number [phone].

## 2017-05-10 NOTE — TELEPHONE ENCOUNTER
Meloxicam 15 mg daily prescription sent in place of Naprosyn since patient states that she cannot take it.

## 2017-05-15 DIAGNOSIS — G89.29 CHRONIC LEFT SHOULDER PAIN: ICD-10-CM

## 2017-05-15 DIAGNOSIS — M25.512 CHRONIC LEFT SHOULDER PAIN: ICD-10-CM

## 2017-05-15 DIAGNOSIS — Z79.4 TYPE 2 DIABETES MELLITUS WITHOUT COMPLICATION, WITH LONG-TERM CURRENT USE OF INSULIN (HCC): ICD-10-CM

## 2017-05-15 DIAGNOSIS — E11.9 TYPE 2 DIABETES MELLITUS WITHOUT COMPLICATION, WITH LONG-TERM CURRENT USE OF INSULIN (HCC): ICD-10-CM

## 2017-05-15 RX ORDER — MELOXICAM 15 MG/1
15 TABLET ORAL DAILY
Qty: 90 TAB | Refills: 1 | Status: SHIPPED | OUTPATIENT
Start: 2017-05-15 | End: 2017-06-21 | Stop reason: ALTCHOICE

## 2017-05-19 ENCOUNTER — TELEPHONE (OUTPATIENT)
Dept: INTERNAL MEDICINE CLINIC | Age: 67
End: 2017-05-19

## 2017-05-19 NOTE — TELEPHONE ENCOUNTER
Patient informed that insulin was sent to pharmacy. Patient states she cannot take naprosyn as it causes gas,belching and stomach pain. Allergy list updated. Patient wants another medication sent to [facility] mail order pharmacy. She wants to make Dr Gunnar Good aware she has family history of heart problems and does not want a mediction that will cause a heart attack.

## 2017-05-19 NOTE — TELEPHONE ENCOUNTER
Phone Number: [phone] (Call me)                     Patient has some questions about her meloxicam (MOBIC) 15 mg tablet patient stated the medication has naproxen in it and she has an allergic reaction to Naproxen. Patient also wants to know if her insulin was called in.

## 2017-06-15 ENCOUNTER — TELEPHONE (OUTPATIENT)
Dept: INTERNAL MEDICINE CLINIC | Age: 67
End: 2017-06-15

## 2017-06-15 NOTE — TELEPHONE ENCOUNTER
Pt states was prescribed 2 pain medications but isn't able to take either; wants to know what her next steps should be

## 2017-06-21 ENCOUNTER — OFFICE VISIT (OUTPATIENT)
Dept: INTERNAL MEDICINE CLINIC | Age: 67
End: 2017-06-21

## 2017-06-21 VITALS
WEIGHT: 131.6 LBS | OXYGEN SATURATION: 98 % | RESPIRATION RATE: 16 BRPM | TEMPERATURE: 98 F | BODY MASS INDEX: 27.62 KG/M2 | HEIGHT: 58 IN | DIASTOLIC BLOOD PRESSURE: 73 MMHG | HEART RATE: 58 BPM | SYSTOLIC BLOOD PRESSURE: 113 MMHG

## 2017-06-21 DIAGNOSIS — Z78.0 ASYMPTOMATIC MENOPAUSAL STATE: ICD-10-CM

## 2017-06-21 DIAGNOSIS — Z13.820 SCREENING FOR OSTEOPOROSIS: ICD-10-CM

## 2017-06-21 DIAGNOSIS — E78.2 MIXED HYPERLIPIDEMIA: ICD-10-CM

## 2017-06-21 DIAGNOSIS — Z12.11 SCREEN FOR COLON CANCER: ICD-10-CM

## 2017-06-21 DIAGNOSIS — M25.50 ARTHRALGIA, UNSPECIFIED JOINT: ICD-10-CM

## 2017-06-21 DIAGNOSIS — E10.9 TYPE 1 DIABETES MELLITUS WITHOUT COMPLICATION (HCC): Primary | ICD-10-CM

## 2017-06-21 DIAGNOSIS — Z12.31 ENCOUNTER FOR SCREENING MAMMOGRAM FOR MALIGNANT NEOPLASM OF BREAST: ICD-10-CM

## 2017-06-21 DIAGNOSIS — M15.9 PRIMARY OSTEOARTHRITIS INVOLVING MULTIPLE JOINTS: ICD-10-CM

## 2017-06-21 DIAGNOSIS — I10 ESSENTIAL HYPERTENSION: ICD-10-CM

## 2017-06-21 DIAGNOSIS — J30.2 SEASONAL ALLERGIC RHINITIS, UNSPECIFIED ALLERGIC RHINITIS TRIGGER: ICD-10-CM

## 2017-06-21 LAB — GLUCOSE POC: 160 MG/DL (ref 70–110)

## 2017-06-21 RX ORDER — DEXTROMETHORPHAN HYDROBROMIDE, GUAIFENESIN 5; 100 MG/5ML; MG/5ML
650 LIQUID ORAL
Qty: 90 TAB | Refills: 1 | Status: SHIPPED | OUTPATIENT
Start: 2017-06-21 | End: 2017-09-27 | Stop reason: ALTCHOICE

## 2017-06-21 NOTE — PROGRESS NOTES
CC:   Chief Complaint   Patient presents with    Diabetes     fell in bedroom    Osteoarthritis    Medication Problem     wants pain medication       HISTORY OF PRESENT ILLNESS  Adolfo Geronimo is a 73 y.o. female. Presents for 1 month follow up evaluation. She has type 1 DM, HTN, hyperlipidemia, and seasonal allergic rhinitis. Today she complains of right-sided low back pain after falling out of bed a few days ago. Also has diffuse OA pain, especially at hands, arms, shoulders, low back, legs, knees, feet. Has painful  hammertoes and bunions. Denies numbness, tingling, or burning at feet or hands. Reports she was afraid to take meloxicam because naproxen caused bad stomach pains in the past.     Endocrine Review  She is seen for diabetes.  Reports she has type 1, not type 2 DM. Was diagnosed at age 22; has always been on insulin. Since last visit she reports no polyuria or polydipsia, infrequent hypoglycemia.  Home glucose monitoring: is performed regularly.  Checks BS 2 times a day. FBS: [phone], PM (before dinner): [phone]. Brought in BS log. She reports medication compliance: compliant most of the time.  Medication side effects: none.  Diabetic diet compliance: compliant most of the time.  Lab review: orders written for new lab studies as appropriate; see orders.  Eye exam: overdue.        Soc Hx  . Has 1 son; no grandchildren. Lives with her son in a trailer home. Works at [employer] Usually works on Wednesdays, Saturdays, and Sundays. Does not drive; walks to work. Never smoker. Denies alcohol or recreational drug use.  Drinks a cup of coffee 2 times a day.     Health Maintenance  Flu vaccine: made her sick before; declined                                                          Pneumonia vaccine: states she had long time ago; declined                                              Tetanus vaccine: unknown; no indicated Zoster vaccine: never had; declined  Pap smear: unknown; declined  Colonoscopy: due for this  Mammogram: unknown; due for this  Bone density:  due for this  Eye exam: [address] in 1/17, got new glasses  Foot exam: due for this  Lipids: 4/28/17 (tot chol 169, LDL 86)  A1c: 4/28/17 (8.6%)  Advanced Directives: given information previously  End of Life:  given information previously    ROS  Constitutional: negative for fevers, chills, night sweats  ENT:   negative for sore throat, nasal congestion, ear pains, hoarseness  Respiratory:  negative for cough, hemoptysis, dyspnea,wheezing  CV:   negative for chest pain, palpitations, lower extremity edema  GI:   negative for heartburn, abd pain, nausea, vomiting, diarrhea, constipation  Genitourinary: negative for frequency, dysuria and hematuria  Integument:  negative for rash and pruritus  Musculoskel: negative for myalgias, muscle weakness  Neurological:  negative for headaches, dizziness, vertigo, gait problems  Behavl/Psych: negative for feelings of anxiety, depression, mood change     Patient Active Problem List   Diagnosis Code    Type 2 diabetes mellitus without complication, with long-term current use of insulin ([facility]) E11.9, Z79.4    Essential hypertension I10    Mixed hyperlipidemia E78.2    Allergic rhinitis J30.9    Vitamin D deficiency E55.9    Chronic left shoulder pain M25.512, G89.29     Past Medical History:   Diagnosis Date    Arthritis     Diabetes ([facility] [address]     Hypercholesterolemia     Hypertension      Allergies   Allergen Reactions    Compazine [Prochlorperazine Edisylate] Other (comments)     Tongue swelling, drooling    Naprosyn [Naproxen] Other (comments)     Belching,gas,stomach pain     Current Outpatient Prescriptions   Medication Sig Dispense Refill    insulin NPH/insulin regular (NOVOLIN 70/30, HUMULIN 70/30) 100 unit/mL (70-30) injection Take sq 20 units with breakfast and 16 units with dinner.  If BS>300, take 22 units in am and 18 units in pm. 30 mL 3    ergocalciferol (ERGOCALCIFEROL) 50,000 unit capsule Take 1 Cap by mouth every seven (7) days. 12 Cap 2    Insulin Syringe-Needle U-100 (BD LO-DOSE MICRO-FINE IV) 1/2 mL 28 gauge x 1/2\" syrg Use to inject insulin twice daily dx 200 Syringe 3    ACCU-CHEK CAROL PLUS METER misc       SYRINGE-NEEDLE,INSULIN,0.5 ML (B-D INSULIN SYRINGE LO-DOSE) by Does Not Apply route. Use to inject insulin twice a day for      aspirin (ASPIRIN) 325 mg tablet Take 1 Tab by mouth daily. 30 Tab 5    losartan (COZAAR) 50 mg tablet TAKE 1 TABLET EVERY DAY  Indications: hypertension 90 Tab 3    rosuvastatin (CRESTOR) 20 mg tablet Take 1 Tab by mouth nightly. Indications: hyperlipidemia 90 Tab 3    ACCU-CHEK CAROL PLUS TEST STRP strip Testing blood sugar twice a day Dx E11.9 200 Strip 3    ACCU-CHEK SOFTCLIX LANCETS misc Use to test blood sugar twice a day for Dx: E11.9 200 Each 3    cetirizine (ZYRTEC) 10 mg tablet Take 1 Tab by mouth daily. Indications: ALLERGIC RHINITIS 90 Tab 3    meloxicam (MOBIC) 15 mg tablet Take 1 Tab by mouth daily. Take with food. For chronic pain. 90 Tab 1         PHYSICAL EXAM  Visit Vitals    /73 (BP 1 Location: Right arm, BP Patient Position: Sitting)    Pulse (!) 58    Temp 98 °F (36.7 °C) (Oral)    Resp 16    Ht 4' 10\" (1.473 m)    Wt 131 lb 9.6 oz (59.7 kg)    SpO2 98%    BMI 27.5 kg/m2       General: Well-developed and well-nourished, no distress. HEENT:  Head normocephalic/atraumatic, no scleral icterus  Neck: Supple. No carotid bruits, JVD, lymphadenopathy, or thyromegaly. Lungs:  Clear to ausculation bilaterally. Good air movement. Heart:  Bradycardic, regular rhythm, normal S1 and S2, no murmur, gallop, or rub  Abdomen: Soft, non-distended, normal bowel sounds, no tenderness, no guarding, masses, rebound tenderness, or HSM. Extremities: No clubbing, cyanosis, or edema. Neurological: Alert and oriented.   Psychiatric: Normal mood and affect. Behavior is normal.   Diabetic foot exam:     Left: Reflexes 2+     Vibratory sensation normal    Proprioception normal   Sharp/dull discrimination normal    Filament test normal sensation with micro filament   Pulse DP: 2+ (normal)   Pulse PT: 2+ (normal)   Deformities: Yes - severe bunion, hammertoe, thick callus at plantar surface  Right: Reflexes 2+   Vibratory sensation normal   Proprioception normal   Sharp/dull discrimination normal   Filament test normal sensation with micro filament   Pulse DP: 2+ (normal)   Pulse PT: 2+ (normal)   Deformities: Yes - severe bunion, hammertoe, small callus        ASSESSMENT AND PLAN    ICD-10-CM ICD-9-CM    1. Type 1 diabetes mellitus without complication (HCC) X94.9 250.01  DIABETES FOOT EXAM      REFERRAL TO OPHTHALMOLOGY      REFERRAL TO PODIATRY      AMB POC GLUCOSE BLOOD, BY GLUCOSE MONITORING DEVICE      Insulin Syringe-Needle, Dis Un 0.3 mL 30 gauge x 5/16\" syrg   2. Essential hypertension I10 401.9    3. Mixed hyperlipidemia E78.2 272.2    4. Seasonal allergic rhinitis, unspecified allergic rhinitis trigger J30.2 477.9    5. Primary osteoarthritis involving multiple joints M15.0 715.09    6. Asymptomatic menopausal state Z78.0 V49.81 DEXA BONE DENSITY STUDY AXIAL   7. Arthralgia, unspecified joint M25.50 719.40 SED RATE (ESR)      RA + CCP ABS      CANDIE W/REFLEX   8. Screening for osteoporosis Z13.820 V82.81 DEXA BONE DENSITY STUDY AXIAL   9. Encounter for screening mammogram for malignant neoplasm of breast Z12.31 V76.12 LG MAMMO BI SCREENING INCL CAD   8. Screen for colon cancer Z12.11 V76.51 OCCULT BLOOD, IMMUNOASSAY (FIT)       Cherry Fong was seen today for diabetes, osteoarthritis and medication problem. Diagnoses and all orders for this visit:    Type 1 diabetes mellitus without complication (HCC)  Last A1c 8.6%. Improving BS's.  Continue on same doses of insulin.  -      DIABETES FOOT EXAM  -     REFERRAL TO OPHTHALMOLOGY ( Devin Li)  -     REFERRAL TO PODIATRY (Dr. Luigi Mcelroy)  -     AMB POC GLUCOSE BLOOD, BY GLUCOSE MONITORING DEVICE  -     Insulin Syringe-Needle, Dis Un 0.3 mL 30 gauge x 5/16\" syrg; Use to inject insulin twice a day for type 1 diabetes    Essential hypertension  /73. Well-controlled. Continue losartan. Mixed hyperlipidemia  Controlled. Continue Crestor. Seasonal allergic rhinitis, unspecified allergic rhinitis trigger  Controlled. Continue Zyrtec. Primary osteoarthritis involving multiple joints  Has stomach pains with NSAID's. Stop meloxicam.        -     Start acetaminophen (TYLENOL ARTHRITIS PAIN) 650 mg CR tablet; Take 1 Tab by mouth every eight (8) hours as needed for Pain. Indications: Osteoarthritis    Asymptomatic menopausal state  -     DEXA BONE DENSITY STUDY AXIAL; Future    Arthralgia, unspecified joint  Rule out rheumatoid arthritis. -     SED RATE (ESR)  -     RA + CCP ABS  -     CANDIE W/REFLEX    Screening for osteoporosis  -     DEXA BONE DENSITY STUDY AXIAL; Future    Encounter for screening mammogram for malignant neoplasm of breast  -     [facility] MAMMO BI SCREENING INCL CAD; Future    Screen for colon cancer  -     OCCULT BLOOD, IMMUNOASSAY (FIT)      Follow-up Disposition:  Return in about 2 months (around 8/21/2017), or if symptoms worsen or fail to improve, for Type 1 DM, HTN, hyperlipidemia. Provided patient and/or family with advanced directive information and answered pertinent questions. Encouraged patient to provide a copy of advanced directive to the office when available. I have discussed the diagnosis with the patient and the intended plan as seen in the above orders. Patient is in agreement. The patient has received an after-visit summary and questions were answered concerning future plans. I have discussed medication side effects and warnings with the patient as well.

## 2017-06-21 NOTE — MR AVS SNAPSHOT
Visit Information Date & Time Provider Department Dept. Phone Encounter #  
 6/21/2017  9:50 AM Joann AlvarezDenny [phone] [id number] Follow-up Instructions Return in about 2 months (around 8/21/2017), or if symptoms worsen or fail to improve, for Type 1 DM, HTN, hyperlipidemia. Upcoming Health Maintenance Date Due  
 FOOT EXAM Q1 9/26/1961 BREAST CANCER SCRN MAMMOGRAM 9/26/2001 FOBT Q 1 YEAR AGE 50-75 9/26/2001 MICROALBUMIN Q1 2/25/2016 OSTEOPOROSIS SCREENING (DEXA) 9/26/2016 Pneumococcal 65+ Low/Medium Risk (1 of 2 - PCV13) 9/26/2016 EYE EXAM RETINAL OR DILATED Q1 6/7/2017 INFLUENZA AGE 9 TO ADULT 8/1/2017 HEMOGLOBIN A1C Q6M 10/28/2017 LIPID PANEL Q1 4/28/2018 MEDICARE YEARLY EXAM 4/29/2018 GLAUCOMA SCREENING Q2Y 6/7/2018 DTaP/Tdap/Td series (2 - Td) 4/27/2027 Allergies as of 6/21/2017  Review Complete On: 6/21/2017 By: Joann Alvarez MD  
  
 Severity Noted Reaction Type Reactions Compazine [Prochlorperazine Edisylate]  04/12/2010    Other (comments) Tongue swelling, drooling Naprosyn [Naproxen]  05/19/2017   Side Effect Other (comments) Belching,gas,stomach pain Current Immunizations  Reviewed on 4/13/2010 Name Date  
 TD Vaccine 4/13/2010  2:21 AM  
  
 Not reviewed this visit You Were Diagnosed With   
  
 Codes Comments Type 1 diabetes mellitus without complication (HCC)    -  Primary ICD-10-CM: E10.9 ICD-9-CM: 250.01 Essential hypertension     ICD-10-CM: I10 
ICD-9-CM: 401.9 Mixed hyperlipidemia     ICD-10-CM: E78.2 ICD-9-CM: 272.2 Seasonal allergic rhinitis, unspecified allergic rhinitis trigger     ICD-10-CM: J30.2 ICD-9-CM: 477.9 Primary osteoarthritis involving multiple joints     ICD-10-CM: M15.0 ICD-9-CM: 715.09 Asymptomatic menopausal state     ICD-10-CM: Z78.0 ICD-9-CM: V49.81 Arthralgia, unspecified joint     ICD-10-CM: M25.50 ICD-9-CM: 719.40 Screening for osteoporosis     ICD-10-CM: Z13.820 ICD-9-CM: V82.81 Encounter for screening mammogram for malignant neoplasm of breast     ICD-10-CM: Z12.31 
ICD-9-CM: V76.12 Screen for colon cancer     ICD-10-CM: Z12.11 ICD-9-CM: V76.51 Vitals BP Pulse Temp Resp Height(growth percentile) Weight(growth percentile) 113/73 (BP 1 Location: Right arm, BP Patient Position: Sitting) (!) 58 98 °F (36.7 °C) (Oral) 16 4' 10\" (1.473 m) 131 lb 9.6 oz (59.7 kg) SpO2 BMI OB Status Smoking Status 98% 27.5 kg/m2 Postmenopausal Never Smoker BMI and BSA Data Body Mass Index Body Surface Area  
 27.5 kg/m 2 1.56 m 2 Preferred Pharmacy Pharmacy Name Phone [place] [id number] [phone] Your Updated Medication List  
  
   
This list is accurate as of: 6/21/17 10:41 AM.  Always use your most recent med list.  
  
  
  
  
 [address] Generic drug:  Blood-Glucose Meter ACCU-CHEK CAROL PLUS TEST STRP strip Generic drug:  glucose blood VI test strips Testing blood sugar twice a day Dx E11.9 [facility] Generic drug:  Lancets Use to test blood sugar twice a day for Dx: E11.9  
  
 acetaminophen 650 mg CR tablet Commonly known as:  TYLENOL ARTHRITIS PAIN Take 1 Tab by mouth every eight (8) hours as needed for Pain. Indications: Osteoarhthritis  
  
 aspirin 325 mg tablet Commonly known as:  ASPIRIN Take 1 Tab by mouth daily. * B-D INSULIN SYRINGE LO-DOSE  
by Does Not Apply route. Use to inject insulin twice a day for * Insulin Syringe-Needle U-100 1/2 mL 28 gauge x 1/2\" Syrg Commonly known as:  BD LO-DOSE MICRO-FINE IV Use to inject insulin twice daily dx  
  
 cetirizine 10 mg tablet Commonly known as:  ZYRTEC Take 1 Tab by mouth daily. Indications: ALLERGIC RHINITIS  
  
 ergocalciferol 50,000 unit capsule Commonly known as:  ERGOCALCIFEROL Take 1 Cap by mouth every seven (7) days. insulin NPH/insulin regular 100 unit/mL (70-30) injection Commonly known as:  NOVOLIN 70/30, HUMULIN 70/30 Take sq 20 units with breakfast and 16 units with dinner. If BS>300, take 22 units in am and 18 units in pm.  
  
 losartan 50 mg tablet Commonly known as:  COZAAR  
TAKE 1 TABLET EVERY DAY  Indications: hypertension  
  
 rosuvastatin 20 mg tablet Commonly known as:  CRESTOR Take 1 Tab by mouth nightly. Indications: hyperlipidemia * Notice: This list has 2 medication(s) that are the same as other medications prescribed for you. Read the directions carefully, and ask your doctor or other care provider to review them with you. Prescriptions Sent to Pharmacy Refills  
 acetaminophen (TYLENOL ARTHRITIS PAIN) 650 mg CR tablet 1 Sig: Take 1 Tab by mouth every eight (8) hours as needed for Pain. Indications: Osteoarhthritis Class: Normal  
 Pharmacy: [address] [place] [address], [address] [place] [address] #: [phone] Route: Oral  
  
We Performed the Following CANDIE W/REFLEX [48708 CPT(R)]  DIABETES FOOT EXAM [HM7 Custom] OCCULT BLOOD, IMMUNOASSAY (FIT) [id number] CPT(R)]   
 RA + CCP ABS [[id number] Custom] REFERRAL TO OPHTHALMOLOGY [REF57 Custom] Comments:  
 Please evaluate patient for dilated/retinal exam and glaucoma. REFERRAL TO PODIATRY [REF90 Custom] Comments:  
 Evaluation and management of calluses, bunions, and hammer toes in patient with type 1 DM. SED RATE (ESR) [id number] CPT(R)] Follow-up Instructions Return in about 2 months (around 8/21/2017), or if symptoms worsen or fail to improve, for Type 1 DM, HTN, hyperlipidemia. To-Do List   
 06/24/2017 Imaging:  DEXA BONE DENSITY STUDY AXIAL   
  
 08/18/2017 Imaging:  LG MAMMO BI SCREENING INCL CAD Referral Information Referral ID Referred By Referred To  
  
 [id number] [facility], [address] [facility] Lizbet Faulkner [address] Phone: [phone] Fax: [phone] Visits Status Start Date End Date 1 New Request 6/21/17 6/21/18 If your referral has a status of pending review or denied, additional information will be sent to support the outcome of this decision. Referral ID Referred By Referred To  
 [id number] [facility], [facility] [address] [place], [address] [place] [address] Phone: [phone] Fax: [phone] Visits Status Start Date End Date 1 New Request 6/21/17 6/21/18 If your referral has a status of pending review or denied, additional information will be sent to support the outcome of this decision. Patient Instructions Diabetes Foot Health: Care Instructions Your Care Instructions When you have diabetes, your feet need extra care and attention. Diabetes can damage the nerve endings and blood vessels in your feet, making you less likely to notice when your feet are injured. Diabetes also limits your body's ability to fight infection and get blood to areas that need it. If you get a minor foot injury, it could become an ulcer or a serious infection. With good foot care, you can prevent most of these problems. Caring for your feet can be quick and easy. Most of the care can be done when you are bathing or getting ready for bed. Follow-up care is a key part of your treatment and safety. Be sure to make and go to all appointments, and call your doctor if you are having problems. Its also a good idea to know your test results and keep a list of the medicines you take. How can you care for yourself at home? · Keep your blood sugar close to normal by watching what and how much you eat, monitoring blood sugar, taking medicines if prescribed, and getting regular exercise. · Do not smoke. Smoking affects blood flow and can make foot problems worse. If you need help quitting, talk to your doctor about stop-smoking programs and medicines. These can increase your chances of quitting for good. · Eat a diet that is low in fats. High fat intake can cause fat to build up in your blood vessels and decrease blood flow. · Inspect your feet daily for blisters, cuts, cracks, or sores. If you cannot see well, use a mirror or have someone help you. · Take care of your feet: 
[facility] your feet every day. Use warm (not hot) water. Check the water temperature with your wrists or other part of your body, not your feet. ¨ Dry your feet well. Pat them dry. Do not rub the skin on your feet too hard. Dry well between your toes. If the skin on your feet stays moist, bacteria or a fungus can grow, which can lead to infection. ¨ Keep your skin soft. Use moisturizing skin cream to keep the skin on your feet soft and prevent calluses and cracks. But do not put the cream between your toes, and stop using any cream that causes a rash. ¨ Clean underneath your toenails carefully. Do not use a sharp object to clean underneath your toenails. Use the blunt end of a nail file or other rounded tool. ¨ Trim and file your toenails straight across to prevent ingrown toenails. Use a nail clipper, not scissors. Use an emery board to smooth the edges. · Change socks daily. Socks without seams are best, because seams often rub the feet. You can find socks for people with diabetes from specialty catalogs. · Look inside your shoes every day for things like gravel or torn linings, which could cause blisters or sores. · Buy shoes that fit well: 
¨ Look for shoes that have plenty of space around the toes. This helps prevent bunions and blisters. ¨ Try on shoes while wearing the kind of socks you will usually wear with the shoes. ¨ Avoid plastic shoes. They may rub your feet and cause blisters.  Good shoes should be made of materials that are flexible and breathable, such as leather or cloth. ¨ Break in new shoes slowly by wearing them for no more than an hour a day for several days. Take extra time to check your feet for red areas, blisters, or other problems after you wear new shoes. · Do not go barefoot. Do not wear sandals, and do not wear shoes with very thin soles. Thin soles are easy to puncture. They also do not protect your feet from hot pavement or cold weather. · Have your doctor check your feet during each visit. If you have a foot problem, see your doctor. Do not try to treat an early foot problem at home. Home remedies or treatments that you can buy without a prescription (such as corn removers) can be harmful. · Always get early treatment for foot problems. A minor irritation can lead to a major problem if not properly cared for early. When should you call for help? Call your doctor now or seek immediate medical care if: 
· You have a foot sore, an ulcer or break in the skin that is not healing after 4 days, bleeding corns or calluses, or an ingrown toenail. · You have blue or black areas, which can mean bruising or blood flow problems. · You have peeling skin or tiny blisters between your toes or cracking or oozing of the skin. · You have a fever for more than 24 hours and a foot sore. · You have new numbness or tingling in your feet that does not go away after you move your feet or change positions. · You have unexplained or unusual swelling of the foot or ankle. Watch closely for changes in your health, and be sure to contact your doctor if: 
· You cannot do proper foot care. Where can you learn more? Go to http://sammi-amanda.info/. Enter A739 in the search box to learn more about \"Diabetes Foot Health: Care Instructions. \" Current as of: March 13, 2017 Content Version: 11.3 © 6614-0094 [employer].  Care instructions adapted under license by [address] (which disclaims liability or warranty for this information). If you have questions about a medical condition or this instruction, always ask your healthcare professional. [address] any warranty or liability for your use of this information. Introducing [facility]! Tanis Epley introduces [employer] patient portal. Now you can access parts of your medical record, email your doctor's office, and request medication refills online. 1. In your internet browser, go to [email or web address] 2. Click on the First Time User? Click Here link in the Sign In box. You will see the New Member Sign Up page. 3. Enter your [employer] Access Code exactly as it appears below. You will not need to use this code after youve completed the sign-up process. If you do not sign up before the expiration date, you must request a new code. · [employer] Access Code: [email or web address] Expires: 7/27/2017  9:28 AM 
 
4. Enter the last four digits of your Social Security Number (xxxx) and Date of Birth (mm/dd/yyyy) as indicated and click Submit. You will be taken to the next sign-up page. 5. Create a [employer] ID. This will be your [employer] login ID and cannot be changed, so think of one that is secure and easy to remember. 6. Create a [employer] password. You can change your password at any time. 7. Enter your Password Reset Question and Answer. This can be used at a later time if you forget your password. 8. Enter your e-mail address. You will receive e-mail notification when new information is available in [address] 9. Click Sign Up. You can now view and download portions of your medical record. 10. Click the Download Summary menu link to download a portable copy of your medical information. If you have questions, please visit the Frequently Asked Questions section of the [employer] website.  Remember, [employer] is NOT to be used for urgent needs. For medical emergencies, dial 911. Now available from your iPhone and Android! Please provide this summary of care documentation to your next provider. Your primary care clinician is listed as Mara Giron. If you have any questions after today's visit, please call [phone].

## 2017-06-21 NOTE — PROGRESS NOTES
Reviewed record  In preparation for visit and have obtained necessary documentation. 1. Have you been to the ER, urgent care clinic since your last visit? Hospitalized since your last visit?no  2. Have you seen or consulted any other health care providers outside of the [address] [place] [address] since your last visit? Include any pap smears or colon screening. no  Per Dr. Stephie Cabrera verbal order read back orders placed for poc glucose and syringe/insulin needles. .  Declines pneumonia vaccine.

## 2017-06-21 NOTE — PATIENT INSTRUCTIONS
Diabetes Foot Health: Care Instructions  Your Care Instructions    When you have diabetes, your feet need extra care and attention. Diabetes can damage the nerve endings and blood vessels in your feet, making you less likely to notice when your feet are injured. Diabetes also limits your body's ability to fight infection and get blood to areas that need it. If you get a minor foot injury, it could become an ulcer or a serious infection. With good foot care, you can prevent most of these problems. Caring for your feet can be quick and easy. Most of the care can be done when you are bathing or getting ready for bed. Follow-up care is a key part of your treatment and safety. Be sure to make and go to all appointments, and call your doctor if you are having problems. Its also a good idea to know your test results and keep a list of the medicines you take. How can you care for yourself at home? · Keep your blood sugar close to normal by watching what and how much you eat, monitoring blood sugar, taking medicines if prescribed, and getting regular exercise. · Do not smoke. Smoking affects blood flow and can make foot problems worse. If you need help quitting, talk to your doctor about stop-smoking programs and medicines. These can increase your chances of quitting for good. · Eat a diet that is low in fats. High fat intake can cause fat to build up in your blood vessels and decrease blood flow. · Inspect your feet daily for blisters, cuts, cracks, or sores. If you cannot see well, use a mirror or have someone help you. · Take care of your feet:  [facility] your feet every day. Use warm (not hot) water. Check the water temperature with your wrists or other part of your body, not your feet. ¨ Dry your feet well. Pat them dry. Do not rub the skin on your feet too hard. Dry well between your toes. If the skin on your feet stays moist, bacteria or a fungus can grow, which can lead to infection.   ¨ Keep your skin soft. Use moisturizing skin cream to keep the skin on your feet soft and prevent calluses and cracks. But do not put the cream between your toes, and stop using any cream that causes a rash. ¨ Clean underneath your toenails carefully. Do not use a sharp object to clean underneath your toenails. Use the blunt end of a nail file or other rounded tool. ¨ Trim and file your toenails straight across to prevent ingrown toenails. Use a nail clipper, not scissors. Use an emery board to smooth the edges. · Change socks daily. Socks without seams are best, because seams often rub the feet. You can find socks for people with diabetes from specialty catalogs. · Look inside your shoes every day for things like gravel or torn linings, which could cause blisters or sores. · Buy shoes that fit well:  ¨ Look for shoes that have plenty of space around the toes. This helps prevent bunions and blisters. ¨ Try on shoes while wearing the kind of socks you will usually wear with the shoes. ¨ Avoid plastic shoes. They may rub your feet and cause blisters. Good shoes should be made of materials that are flexible and breathable, such as leather or cloth. ¨ Break in new shoes slowly by wearing them for no more than an hour a day for several days. Take extra time to check your feet for red areas, blisters, or other problems after you wear new shoes. · Do not go barefoot. Do not wear sandals, and do not wear shoes with very thin soles. Thin soles are easy to puncture. They also do not protect your feet from hot pavement or cold weather. · Have your doctor check your feet during each visit. If you have a foot problem, see your doctor. Do not try to treat an early foot problem at home. Home remedies or treatments that you can buy without a prescription (such as corn removers) can be harmful. · Always get early treatment for foot problems. A minor irritation can lead to a major problem if not properly cared for early.   When should you call for help? Call your doctor now or seek immediate medical care if:  · You have a foot sore, an ulcer or break in the skin that is not healing after 4 days, bleeding corns or calluses, or an ingrown toenail. · You have blue or black areas, which can mean bruising or blood flow problems. · You have peeling skin or tiny blisters between your toes or cracking or oozing of the skin. · You have a fever for more than 24 hours and a foot sore. · You have new numbness or tingling in your feet that does not go away after you move your feet or change positions. · You have unexplained or unusual swelling of the foot or ankle. Watch closely for changes in your health, and be sure to contact your doctor if:  · You cannot do proper foot care. Where can you learn more? Go to http://sammi-amanda.info/. Enter A739 in the search box to learn more about \"Diabetes Foot Health: Care Instructions. \"  Current as of: March 13, 2017  Content Version: 11.3  © 7139-3667 [employer]. Care instructions adapted under license by [employer] (which disclaims liability or warranty for this information). If you have questions about a medical condition or this instruction, always ask your healthcare professional. [address] any warranty or liability for your use of this information.

## 2017-06-22 ENCOUNTER — TELEPHONE (OUTPATIENT)
Dept: INTERNAL MEDICINE CLINIC | Age: 67
End: 2017-06-22

## 2017-06-22 LAB
ANA SER QL: NEGATIVE
CCP IGA+IGG SERPL IA-ACNC: 1 UNITS (ref 0–19)
ERYTHROCYTE [SEDIMENTATION RATE] IN BLOOD BY WESTERGREN METHOD: 2 MM/HR (ref 0–40)
RHEUMATOID FACT SERPL-ACNC: <10 IU/ML (ref 0–13.9)

## 2017-06-22 NOTE — TELEPHONE ENCOUNTER
Patient called stating [facility] Mail order pharmacy does not have acetaminophen (TYLENOL ARTHRITIS PAIN) 650 mg CR tablet. Patient needs something else called in that [address] that will be covered.

## 2017-06-26 NOTE — TELEPHONE ENCOUNTER
Patient called to speak with nurse in regards to her Tylenol Arthritis Medication and she can not get it by mail order they don't have it.

## 2017-06-27 NOTE — PROGRESS NOTES
Your recent lab tests for rheumatoid arthritis and inflammatory disorders returned normal. This means you have osteoarthritis, the wear-and-tear type of arthritis.

## 2017-07-06 NOTE — LETTER
7/18/2017 4:12 PM 
 
Ms. Yodit Weston [facility] [facility] [place] [address] Dear Cheryle Grinder: 
 
Please find your most recent results below. Resulted Orders OCCULT BLOOD, IMMUNOASSAY Result Value Ref Range Occult Blood fecal, by [place] Negative Negative Narrative Performed at:  [address]  [id number] : Jazmine Crocker MD, Phone:  [phone] RECOMMENDATIONS: 
Your FIT test for colon cancer screening returned negative. This means you are very unlikely to have colon cancer. You should have a repeat FIT test in one year. Please call me if you have any questions: [phone] Sincerely, MD [facility]

## 2017-07-12 LAB — HEMOCCULT STL QL IA: NEGATIVE

## 2017-07-14 NOTE — PROGRESS NOTES
Your FIT test for colon cancer screening returned negative. This means you are very unlikely to have colon cancer. You should have a repeat FIT test in one year.

## 2017-07-31 ENCOUNTER — TELEPHONE (OUTPATIENT)
Dept: INTERNAL MEDICINE CLINIC | Age: 67
End: 2017-07-31

## 2017-07-31 DIAGNOSIS — M15.9 PRIMARY OSTEOARTHRITIS INVOLVING MULTIPLE JOINTS: Primary | ICD-10-CM

## 2017-07-31 RX ORDER — TRAMADOL HYDROCHLORIDE 50 MG/1
50 TABLET ORAL
Qty: 45 TAB | Refills: 1 | Status: SHIPPED | OUTPATIENT
Start: 2017-07-31 | End: 2017-08-23 | Stop reason: SDUPTHER

## 2017-07-31 NOTE — TELEPHONE ENCOUNTER
Prescription for tramadol 50 mg tablet take 1 tab by mouth every 8 hours as needed for pain max daily amount 150 mg #45 1 refill called to [facility] per written order of Dr. Yoel Golden.   Prescription left on pharmacy voice mail at 4:28 pm.

## 2017-07-31 NOTE — TELEPHONE ENCOUNTER
Patient reports constant pain in hands, fingers, arms and back. Patient reports tylenol arthritis is not helping at all and would like something else to help with her pain.

## 2017-07-31 NOTE — TELEPHONE ENCOUNTER
Patient called to speak with dr Delroy Mckinnon in regards to getting something stronger for pain. The acetaminophen (TYLENOL ARTHRITIS PAIN) 650 mg CR tablet isn't working.

## 2017-08-02 ENCOUNTER — HOSPITAL ENCOUNTER (OUTPATIENT)
Dept: MAMMOGRAPHY | Age: 67
Discharge: HOME | End: 2017-08-02
Attending: INTERNAL MEDICINE
Payer: MEDICARE

## 2017-08-02 DIAGNOSIS — Z13.820 SCREENING FOR OSTEOPOROSIS: ICD-10-CM

## 2017-08-02 DIAGNOSIS — Z78.0 ASYMPTOMATIC MENOPAUSAL STATE: ICD-10-CM

## 2017-08-02 DIAGNOSIS — Z12.31 ENCOUNTER FOR SCREENING MAMMOGRAM FOR MALIGNANT NEOPLASM OF BREAST: ICD-10-CM

## 2017-08-02 PROCEDURE — 77080 DXA BONE DENSITY AXIAL: CPT

## 2017-08-02 PROCEDURE — 77067 SCR MAMMO BI INCL CAD: CPT

## 2017-08-08 NOTE — PROGRESS NOTES
Your recent bone density scan showed osteopenia, or mild bone thinning. Dr. Mary Otero recommends continuing on the vitamin D tablets and increasing weight bearing exercise to improve bone density. Weight-bearing exercises include walking, jogging, climbing stairs, weight training, tennis, dancing, and hiking.

## 2017-08-09 NOTE — PROGRESS NOTES
Spoke with patient and after verifying name and date of birth of patient gave her test results per Dr. Noman Mederos. Patient stated understanding.

## 2017-08-16 DIAGNOSIS — I10 ESSENTIAL HYPERTENSION: ICD-10-CM

## 2017-08-16 RX ORDER — LOSARTAN POTASSIUM 50 MG/1
TABLET ORAL
Qty: 90 TAB | Refills: 3 | Status: SHIPPED | OUTPATIENT
Start: 2017-08-16 | End: 2018-11-14 | Stop reason: SDUPTHER

## 2017-08-23 ENCOUNTER — TELEPHONE (OUTPATIENT)
Dept: INTERNAL MEDICINE CLINIC | Age: 67
End: 2017-08-23

## 2017-08-23 DIAGNOSIS — M15.9 PRIMARY OSTEOARTHRITIS INVOLVING MULTIPLE JOINTS: ICD-10-CM

## 2017-08-23 RX ORDER — TRAMADOL HYDROCHLORIDE 50 MG/1
100 TABLET ORAL
Qty: 180 TAB | Refills: 0 | Status: SHIPPED | OUTPATIENT
Start: 2017-08-23 | End: 2017-09-27 | Stop reason: ALTCHOICE

## 2017-08-23 NOTE — TELEPHONE ENCOUNTER
Patient reports she has multiple joint pain all over her body. Patient states she has been taking tramadol and tylenol arthritis but neither are working. Patient reports she needs something stronger for her body pain.

## 2017-08-23 NOTE — TELEPHONE ENCOUNTER
Pt stated that Tramadol is not working. Would like to be prescribed something else and needs generic version. She stated if she continues to take it, can she get a higher dosage. Pt can be reached at [phone].

## 2017-08-24 NOTE — TELEPHONE ENCOUNTER
Patient returned call stating she does not want the increase of tramadol phoned in r/t she wants something else to take for pain. Patient reports the tramadol has started making her hands and legs shake (like tremors). Advised this writer would send this message to dr Radha Langley and this writer will not call tramadol at this time.

## 2017-08-24 NOTE — TELEPHONE ENCOUNTER
There is no stronger pain medication I can give her without seeing her in clinic. If her pain is so bad, she needs to schedule an appointment to be seen. Will likely see Dr. Isamar England or Luis Turner. She can also go to the [address] [place] [address] Urgent Care Clinic and schedule an appointment with me when I return.

## 2017-08-25 NOTE — TELEPHONE ENCOUNTER
Patient reports she only wants to see dr Kady Huff. She reports she has an upcoming appt in September and will discuss pain with dr Kady Huff at that time.

## 2017-09-27 ENCOUNTER — OFFICE VISIT (OUTPATIENT)
Dept: INTERNAL MEDICINE CLINIC | Age: 67
End: 2017-09-27

## 2017-09-27 VITALS
OXYGEN SATURATION: 97 % | SYSTOLIC BLOOD PRESSURE: 135 MMHG | BODY MASS INDEX: 31.24 KG/M2 | RESPIRATION RATE: 16 BRPM | TEMPERATURE: 98.5 F | DIASTOLIC BLOOD PRESSURE: 76 MMHG | WEIGHT: 135 LBS | HEIGHT: 55 IN | HEART RATE: 60 BPM

## 2017-09-27 DIAGNOSIS — E10.9 TYPE 1 DIABETES MELLITUS WITHOUT COMPLICATION (HCC): Primary | ICD-10-CM

## 2017-09-27 DIAGNOSIS — I10 ESSENTIAL HYPERTENSION: ICD-10-CM

## 2017-09-27 DIAGNOSIS — M15.9 PRIMARY OSTEOARTHRITIS INVOLVING MULTIPLE JOINTS: ICD-10-CM

## 2017-09-27 DIAGNOSIS — E78.2 MIXED HYPERLIPIDEMIA: ICD-10-CM

## 2017-09-27 DIAGNOSIS — E66.9 OBESITY (BMI 30-39.9): ICD-10-CM

## 2017-09-27 DIAGNOSIS — E55.9 VITAMIN D DEFICIENCY: ICD-10-CM

## 2017-09-27 DIAGNOSIS — G89.4 CHRONIC PAIN SYNDROME: ICD-10-CM

## 2017-09-27 DIAGNOSIS — R45.89 DEPRESSED MOOD: ICD-10-CM

## 2017-09-27 DIAGNOSIS — J30.2 SEASONAL ALLERGIC RHINITIS, UNSPECIFIED ALLERGIC RHINITIS TRIGGER: ICD-10-CM

## 2017-09-27 PROBLEM — M85.80 OSTEOPENIA: Status: ACTIVE | Noted: 2017-09-27

## 2017-09-27 LAB
GLUCOSE POC: 181 MG/DL (ref 70–110)
HBA1C MFR BLD HPLC: 9.5 % (ref 4.8–5.6)

## 2017-09-27 RX ORDER — MELOXICAM 7.5 MG/1
7.5 TABLET ORAL
Qty: 30 TAB | Refills: 3 | Status: SHIPPED | OUTPATIENT
Start: 2017-09-27 | End: 2018-02-21 | Stop reason: ALTCHOICE

## 2017-09-27 NOTE — MR AVS SNAPSHOT
Visit Information Date & Time Provider Department Dept. Phone Encounter #  
 9/27/2017  9:50 AM Yvette Strauss, [facility] [phone] [id number] Follow-up Instructions Return in about 4 weeks (around 10/25/2017), or if symptoms worsen or fail to improve, for DM. Upcoming Health Maintenance Date Due MICROALBUMIN Q1 2/25/2016 EYE EXAM RETINAL OR DILATED Q1 6/7/2017 HEMOGLOBIN A1C Q6M 10/28/2017 LIPID PANEL Q1 4/28/2018 MEDICARE YEARLY EXAM 4/29/2018 GLAUCOMA SCREENING Q2Y 6/7/2018 FOBT Q 1 YEAR AGE 50-75 7/6/2018 FOOT EXAM Q1 8/22/2018 Pneumococcal 65+ Low/Medium Risk (2 of 2 - PPSV23) 9/27/2018 BREAST CANCER SCRN MAMMOGRAM 8/2/2019 DTaP/Tdap/Td series (2 - Td) 4/27/2027 Allergies as of 9/27/2017  Review Complete On: 9/27/2017 By: Yvette Strauss MD  
  
 Severity Noted Reaction Type Reactions Compazine [Prochlorperazine Edisylate]  04/12/2010    Other (comments) Tongue swelling, drooling Naprosyn [Naproxen]  05/19/2017   Side Effect Other (comments) Belching,gas,stomach pain Current Immunizations  Reviewed on 4/13/2010 Name Date  
 TD Vaccine 4/13/2010  2:21 AM  
  
 Not reviewed this visit You Were Diagnosed With   
  
 Codes Comments Type 1 diabetes mellitus without complication (HCC)    -  Primary ICD-10-CM: E10.9 ICD-9-CM: 250.01 Essential hypertension     ICD-10-CM: I10 
ICD-9-CM: 401.9 Mixed hyperlipidemia     ICD-10-CM: E78.2 ICD-9-CM: 272.2 Seasonal allergic rhinitis, unspecified allergic rhinitis trigger     ICD-10-CM: J30.2 ICD-9-CM: 477.9 Vitamin D deficiency     ICD-10-CM: E55.9 ICD-9-CM: 268.9 Primary osteoarthritis involving multiple joints     ICD-10-CM: M15.0 ICD-9-CM: 715.09 Chronic pain syndrome     ICD-10-CM: G89.4 ICD-9-CM: 338.4 Obesity (BMI 30-39. 9)     ICD-10-CM: E66.9 ICD-9-CM: 278.00 Vitals BP Pulse Temp Resp Height(growth percentile) Weight(growth percentile) 135/76 60 98.5 °F (36.9 °C) (Oral) 16 4' 1\" (1.245 m) 135 lb (61.2 kg) SpO2 BMI OB Status Smoking Status 97% 39.53 kg/m2 Postmenopausal Never Smoker Vitals History BMI and BSA Data Body Mass Index Body Surface Area  
 39.53 kg/m 2 1.45 m 2 Preferred Pharmacy Pharmacy Name Phone [id number] [address] [phone] Your Updated Medication List  
  
   
This list is accurate as of: 9/27/17 10:57 AM.  Always use your most recent med list.  
  
  
  
  
 [address] Generic drug:  Blood-Glucose Meter ACCU-CHEK CAROL PLUS TEST STRP strip Generic drug:  glucose blood VI test strips Testing blood sugar twice a day Dx E11.9 [address] [place] Generic drug:  Lancets Use to test blood sugar twice a day for Dx: E11.9  
  
 aspirin 325 mg tablet Commonly known as:  ASPIRIN Take 1 Tab by mouth daily. * B-D INSULIN SYRINGE LO-DOSE  
by Does Not Apply route. Use to inject insulin twice a day for * Insulin Syringe-Needle U-100 1/2 mL 28 gauge x 1/2\" Syrg Commonly known as:  BD LO-DOSE MICRO-FINE IV Use to inject insulin twice daily dx  
  
 cetirizine 10 mg tablet Commonly known as:  ZYRTEC Take 1 Tab by mouth daily. Indications: ALLERGIC RHINITIS  
  
 ergocalciferol 50,000 unit capsule Commonly known as:  ERGOCALCIFEROL Take 1 Cap by mouth every seven (7) days. insulin NPH/insulin regular 100 unit/mL (70-30) injection Commonly known as:  NOVOLIN 70/30, HUMULIN 70/30 Take sq 20 units with breakfast and 16 units with dinner. If BS>300, take 22 units in am and 18 units in pm.  
  
 Insulin Syringe-Needle, Dis Un 0.3 mL 30 gauge x 5/16\" Syrg Use to inject insulin twice a day for type 1 diabetes  
  
 losartan 50 mg tablet Commonly known as:  COZAAR  
 TAKE 1 TABLET EVERY DAY  Indications: hypertension  
  
 meloxicam 7.5 mg tablet Commonly known as:  MOBIC Take 1 Tab by mouth daily as needed for Pain. Take with food. rosuvastatin 20 mg tablet Commonly known as:  CRESTOR Take 1 Tab by mouth nightly. Indications: hyperlipidemia * Notice: This list has 2 medication(s) that are the same as other medications prescribed for you. Read the directions carefully, and ask your doctor or other care provider to review them with you. Prescriptions Sent to Pharmacy Refills  
 meloxicam (MOBIC) 7.5 mg tablet 3 Sig: Take 1 Tab by mouth daily as needed for Pain. Take with food. Class: Normal  
 Pharmacy: [id number] Deanna Silver, [address] Ph #: [phone] Route: Oral  
  
We Performed the Following AMB POC GLUCOSE BLOOD, BY GLUCOSE MONITORING DEVICE [17134 CPT(R)] AMB POC HEMOGLOBIN A1C [17238 CPT(R)] MICROALBUMIN, UR, RAND W/ MICROALBUMIN/CREA RATIO [id number] CPT(R)] REFERRAL TO ENDOCRINOLOGY [MEH45 Custom] Comments:  
 Please evaluate patient for uncontrolled type 1 diabetes mellitus with labile blood sugars. Follow-up Instructions Return in about 4 weeks (around 10/25/2017), or if symptoms worsen or fail to improve, for DM. Referral Information Referral ID Referred By Referred To  
  
 [id number] [facility], [address] [place] [address] [address] [place] [address], [facility] [address] [facility] [address] Phone: [phone] Fax: [phone] Visits Status Start Date End Date 1 New Request 9/27/17 9/27/18 If your referral has a status of pending review or denied, additional information will be sent to support the outcome of this decision. Patient Instructions Patient Instructions 1. Continue taking insulin 70/30 20 units every morning. In the evening, take 16 units if you are home and 12 units if you are at work. 2. Check your blood sugar every morning before breakfast and 1-2 hours after eating dinner. 3. Work on staying away from sweets and starchy foods. Introducing [facility]! Sharon Kohler introduces [employer] patient portal. Now you can access parts of your medical record, email your doctor's office, and request medication refills online. 1. In your internet browser, go to [email or web address] 2. Click on the First Time User? Click Here link in the Sign In box. You will see the New Member Sign Up page. 3. Enter your Sp[employer]ista Access Code exactly as it appears below. You will not need to use this code after youve completed the sign-up process. If you do not sign up before the expiration date, you must request a new code. · [employer] Access Code: [email or web address] Expires: 10/30/2017  1:59 PM 
 
4. Enter the last four digits of your Social Security Number (xxxx) and Date of Birth (mm/dd/yyyy) as indicated and click Submit. You will be taken to the next sign-up page. 5. Create a Spavista ID. This will be your Spavista login ID and cannot be changed, so think of one that is secure and easy to remember. 6. Create a [employer] password. You can change your password at any time. 7. Enter your Password Reset Question and Answer. This can be used at a later time if you forget your password. 8. Enter your e-mail address. You will receive e-mail notification when new information is available in [address] 9. Click Sign Up. You can now view and download portions of your medical record. 10. Click the Download Summary menu link to download a portable copy of your medical information. If you have questions, please visit the Frequently Asked Questions section of the [employer] website. Remember, [employer]ista is NOT to be used for urgent needs. For medical emergencies, dial 911. Now available from your iPhone and Android! Please provide this summary of care documentation to your next provider. Your primary care clinician is listed as Marquis Zavala. If you have any questions after today's visit, please call [phone].

## 2017-09-27 NOTE — LETTER
10/3/2017 10:42 AM 
 
Ms. Yvette Underwood [address] [facility]. [facility] [address] [facility] [place] [address] Dear Karin Marquez: 
 
Please find your most recent results below. Resulted Orders AMB POC GLUCOSE BLOOD, BY GLUCOSE MONITORING DEVICE Result Value Ref Range Glucose  (A) 70 - 110 mg/dL AMB POC HEMOGLOBIN A1C Result Value Ref Range Hemoglobin A1c (POC) 9.5 (A) 4.8 - 5.6 % MICROALBUMIN, UR, RAND W/ MICROALBUMIN/CREA RATIO Result Value Ref Range Creatinine, urine 182.5 Not Estab. mg/dL Microalbumin, urine 72.6 Not Estab. ug/mL Microalb/Creat ratio (ug/mg creat.) 39.8 (H) 0.0 - 30.0 mg/g creat Narrative Performed at:  [address]  [id number] : Power Deal MD, Phone:  [phone] RECOMMENDATIONS: 
Your urine protein test showed that you are spilling a little protein into the urine. This is an early sign of diabetes starting to affect the kidneys. Dr. Tabitha Poon would like you to schedule an appointment to see Dr. Octavia Flores, the diabetes specialist, if you have not already done so. Please call me if you have any questions: [phone] Sincerely, Sung Duffy MD

## 2017-09-27 NOTE — PATIENT INSTRUCTIONS
Patient Instructions  1. Continue taking insulin 70/30 20 units every morning. In the evening, take 16 units if you are home and 12 units if you are at work. 2. Check your blood sugar every morning before breakfast and 1-2 hours after eating dinner. 3. Work on staying away from sweets and starchy foods.

## 2017-09-27 NOTE — PROGRESS NOTES
Reviewed record  In preparation for visit and have obtained necessary documentation. 1. Have you been to the ER, urgent care clinic since your last visit? Hospitalized since your last visit?no  2. Have you seen or consulted any other health care providers outside of the [address] [place] [address] since your last visit? Include any pap smears or colon screening. no  Advanced directives: Patient has been given information on advanced directives at a previous visit. Patients vital signs discussed with physician. Per verbal orders of Dr Dodie Smith orders placed for POC glucose and HBA1C. Declines pcv13 and flu vaccine.

## 2017-09-27 NOTE — PROGRESS NOTES
CC:   Chief Complaint   Patient presents with    Hypertension    Diabetes    Cholesterol Problem    Depression     states she always depressed       HISTORY OF PRESENT ILLNESS  Lobito Palacios is a 78 y.o. female. Presents for 3 month follow up evaluation. She has type 1 DM, HTN, hyperlipidemia, and seasonal allergic rhinitis. Today she complains of right-sided low back pain after falling out of bed a few days ago. Also has diffuse OA pain, especially at hands, arms, shoulders, low back, legs, knees, feet. Has painful  hammertoes and bunions. Denies numbness, tingling, or burning at feet or hands. States that ES Tylenol did not help her pain.     Endocrine Review  She is seen for diabetes. Reports she has type 1, not type 2 DM. Was diagnosed at age 22; has always been on insulin. Since last visit she reports no polyuria or polydipsia; has hypoglycemia 2-4 times a week (eats light dinner when she works evening shift, by bedtime feels hypoglycemic).   Home glucose monitoring: is performed regularly.  Checks BS 2 times a day. FBS: , PM (before dinner): 153-271. Before bedtime on days she works evening shift: 55-87 She reports medication compliance: compliant most of the time.  Medication side effects: none.  Diabetic diet compliance: noncompliant some of the time.  Lab review: orders written for new lab studies as appropriate; see orders. Eye exam: due for this      Soc Hx  . Has 1 son; no grandchildren. Lives with her son in a trailer home. Works at [employer]. Usually works on Wednesdays, Saturdays, and Sundays. Does not drive; walks to work. Never smoker. Denies alcohol or recreational drug use.  Drinks a cup of coffee 2 times a day.      Health Maintenance  Flu vaccine: made her sick before; declined                                                          Pneumonia vaccine: declined                                              Tetanus vaccine: 4/13/10                                                              Zoster vaccine: declined  Colonoscopy: due for this (willing to do FIT test)  Mammogram: 8/2/17  Bone density: 8/2/17, osteopenia  Eye exam: 6/7/16 (Ryann Mccrary through [address]), got new glasses  Foot exam: 8/22/17  Lipids: 4/28/17 (tot chol 169, LDL 86)  A1c: 4/28/17 (8.6%)  Advanced Directives: given information previously  End of Life:  given information previously    ROS  Constitutional: negative for fevers, chills, night sweats  ENT:   negative for sore throat, nasal congestion, ear pains, hoarseness  Respiratory:  negative for cough, hemoptysis, dyspnea,wheezing  CV:   negative for chest pain, palpitations, lower extremity edema  GI:   negative for heartburn, abd pain, nausea, vomiting, diarrhea, constipation  Genitourinary: negative for frequency, dysuria and hematuria  Integument:  negative for rash and pruritus  Musculoskel: negative for myalgias, arthralgias, back pain, muscle weakness, joint pain  Neurological:  negative for headaches, dizziness, vertigo, gait problems  Behavl/Psych: positive for chronic mildly depressed mood     Patient Active Problem List   Diagnosis Code    Type 1 diabetes mellitus without complication (HCC) A07.7    Essential hypertension I10    Mixed hyperlipidemia E78.2    Allergic rhinitis J30.9    Vitamin D deficiency E55.9    Chronic left shoulder pain M25.512, G89.29    Primary osteoarthritis involving multiple joints M15.0     Past Medical History:   Diagnosis Date    Arthritis     Diabetes ([facility] [address]     Hypercholesterolemia     Hypertension      Allergies   Allergen Reactions    Compazine [Prochlorperazine Edisylate] Other (comments)     Tongue swelling, drooling    Naprosyn [Naproxen] Other (comments)     Belching,gas,stomach pain     Current Outpatient Prescriptions   Medication Sig Dispense Refill    losartan (COZAAR) 50 mg tablet TAKE 1 TABLET EVERY DAY  Indications: hypertension 90 Tab 3    Insulin Syringe-Needle, Dis Un 0.3 mL 30 gauge x 5/16\" syrg Use to inject insulin twice a day for type 1 diabetes 200 Syringe 3    insulin NPH/insulin regular (NOVOLIN 70/30, HUMULIN 70/30) 100 unit/mL (70-30) injection Take sq 20 units with breakfast and 16 units with dinner. If BS>300, take 22 units in am and 18 units in pm. 30 mL 3    ergocalciferol (ERGOCALCIFEROL) 50,000 unit capsule Take 1 Cap by mouth every seven (7) days. 12 Cap 2    Insulin Syringe-Needle U-100 (BD LO-DOSE MICRO-FINE IV) 1/2 mL 28 gauge x 1/2\" syrg Use to inject insulin twice daily dx 200 Syringe 3    ACCU-CHEK CAROL PLUS METER misc       SYRINGE-NEEDLE,INSULIN,0.5 ML (B-D INSULIN SYRINGE LO-DOSE) by Does Not Apply route. Use to inject insulin twice a day for      aspirin (ASPIRIN) 325 mg tablet Take 1 Tab by mouth daily. 30 Tab 5    rosuvastatin (CRESTOR) 20 mg tablet Take 1 Tab by mouth nightly. Indications: hyperlipidemia 90 Tab 3    ACCU-CHEK CAROL PLUS TEST STRP strip Testing blood sugar twice a day Dx E11.9 200 Strip 3    ACCU-CHEK SOFTCLIX LANCETS misc Use to test blood sugar twice a day for Dx: E11.9 200 Each 3    cetirizine (ZYRTEC) 10 mg tablet Take 1 Tab by mouth daily. Indications: ALLERGIC RHINITIS 90 Tab 3         PHYSICAL EXAM  Visit Vitals    /76    Pulse 60    Temp 98.5 °F (36.9 °C) (Oral)    Resp 16    Ht 4' 1\" (1.245 m)    Wt 135 lb (61.2 kg)    SpO2 97%    BMI 39.53 kg/m2       General: Obese, no distress. HEENT:  Head normocephalic/atraumatic, no scleral icterus  Lungs:  Clear to ausculation bilaterally. Good air movement. Heart:  Regular rate and rhythm, normal S1 and S2, no murmur, gallop, or rub  Extremities: No clubbing, cyanosis, or edema. Neurological: Alert and oriented. Psychiatric: Normal mood and affect.  Behavior is normal.     Results for orders placed or performed in visit on 09/27/17   AMB POC GLUCOSE BLOOD, BY GLUCOSE MONITORING DEVICE   Result Value Ref Range Glucose  (A) 70 - 110 mg/dL   AMB POC HEMOGLOBIN A1C   Result Value Ref Range    Hemoglobin A1c (POC) 9.5 (A) 4.8 - 5.6 %     Lab Results   Component Value Date/Time    Hemoglobin A1c 8.6 04/28/2017 09:42 AM    Hemoglobin A1c 7.8 07/13/2016 06:35 PM    Hemoglobin A1c 8.1 04/20/2016 06:45 PM         ASSESSMENT AND PLAN    ICD-10-CM ICD-9-CM    1. Type 1 diabetes mellitus without complication (HCC) I56.1 250.01 AMB POC GLUCOSE BLOOD, BY GLUCOSE MONITORING DEVICE      AMB POC HEMOGLOBIN A1C      MICROALBUMIN, UR, RAND W/ MICROALBUMIN/CREA RATIO      REFERRAL TO ENDOCRINOLOGY   2. Essential hypertension I10 401.9    3. Mixed hyperlipidemia E78.2 272.2    4. Seasonal allergic rhinitis, unspecified allergic rhinitis trigger J30.2 477.9    5. Vitamin D deficiency E55.9 268.9    6. Primary osteoarthritis involving multiple joints M15.0 715.09 meloxicam (MOBIC) 7.5 mg tablet   7. Chronic pain syndrome G89.4 338.4 meloxicam (MOBIC) 7.5 mg tablet   8. Depressed mood F32.9 311    9. Obesity (BMI 30-39. 9) E66.9 278.00        Diagnoses and all orders for this visit:    1. Type 1 diabetes mellitus without complication (HCC)  Uncontrolled. A1c 9.5% today, which is not consistent with her home BS readings. Her home glucometer is likely giving falsely lower numbers. Reports she checked her BS an hour before her appointment with her home glucometer; was 150. Has eaten nothing this morning. In clinic, . It is possible she is also having post-meal BS spikes >250 to account for her worsening A1c.  -     AMB POC GLUCOSE BLOOD, BY GLUCOSE MONITORING DEVICE  -     AMB POC HEMOGLOBIN A1C  -     MICROALBUMIN, UR, RAND W/ MICROALBUMIN/CREA RATIO  -     REFERRAL TO ENDOCRINOLOGY (Dr. Octavia Flores); she is reluctant to see an endocrinologist although I explained to her that she needs to since her BS control is worsening. Patient Instructions  1. Continue taking insulin 70/30 20 units every morning.   In the evening, take 16 units if you are home and 12 units if you are at work. 2. Check your blood sugar every morning before breakfast and 1-2 hours after eating dinner. 3. Work on staying away from sweets and starchy foods. 2. Essential hypertension  Controlled. Continue present management. 3. Mixed hyperlipidemia  Controlled. Continue statin. 4. Seasonal allergic rhinitis, unspecified allergic rhinitis trigger  Controlled on Zyrtec. 5. Vitamin D deficiency  Continue once weekly ergocalciferol. 6. Primary osteoarthritis involving multiple joints  -     Start meloxicam (MOBIC) 7.5 mg tablet; Take 1 Tab by mouth daily as needed for Pain. Take with food. 7. Chronic pain syndrome  Stop Tylenol.  -     Start meloxicam (MOBIC) 7.5 mg tablet; Take 1 Tab by mouth daily as needed for Pain. Take with food. 8. Depressed mood  Plan to address at next clinic visit. 9. Obesity (BMI 30-39. 9)  Counseled on diet, exercise, and weight loss. Follow up BMI in 3 months. Follow-up Disposition:  Return in about 4 weeks (around 10/25/2017), or if symptoms worsen or fail to improve, for DM. Provided patient and/or family with advanced directive information and answered pertinent questions. Encouraged patient to provide a copy of advanced directive to the office when available. I have discussed the diagnosis with the patient and the intended plan as seen in the above orders. Patient is in agreement. The patient has received an after-visit summary and questions were answered concerning future plans. I have discussed medication side effects and warnings with the patient as well.

## 2017-09-28 LAB
ALBUMIN/CREAT UR: 39.8 MG/G CREAT (ref 0–30)
CREAT UR-MCNC: 182.5 MG/DL
MICROALBUMIN UR-MCNC: 72.6 UG/ML

## 2017-09-29 NOTE — PROGRESS NOTES
Your urine protein test showed that you are spilling a little protein into the urine. This is an early sign of diabetes starting to affect the kidneys. Dr. Max Hamm would like you to schedule an appointment to see Dr. Daquan Shafer, the diabetes specialist, if you have not already done so.

## 2017-10-23 ENCOUNTER — TELEPHONE (OUTPATIENT)
Dept: INTERNAL MEDICINE CLINIC | Age: 67
End: 2017-10-23

## 2017-11-01 ENCOUNTER — OFFICE VISIT (OUTPATIENT)
Dept: INTERNAL MEDICINE CLINIC | Age: 67
End: 2017-11-01

## 2017-11-01 VITALS
BODY MASS INDEX: 30.78 KG/M2 | DIASTOLIC BLOOD PRESSURE: 79 MMHG | TEMPERATURE: 97.6 F | WEIGHT: 133 LBS | HEART RATE: 77 BPM | SYSTOLIC BLOOD PRESSURE: 120 MMHG | OXYGEN SATURATION: 97 % | HEIGHT: 55 IN | RESPIRATION RATE: 18 BRPM

## 2017-11-01 DIAGNOSIS — E55.9 VITAMIN D DEFICIENCY: ICD-10-CM

## 2017-11-01 DIAGNOSIS — E10.9 TYPE 1 DIABETES MELLITUS WITHOUT COMPLICATION (HCC): Primary | ICD-10-CM

## 2017-11-01 RX ORDER — TRAMADOL HYDROCHLORIDE 50 MG/1
TABLET ORAL
Refills: 0 | COMMUNITY
Start: 2017-07-31 | End: 2017-11-01 | Stop reason: ALTCHOICE

## 2017-11-01 RX ORDER — CETIRIZINE HYDROCHLORIDE 10 MG/1
CAPSULE, LIQUID FILLED ORAL
COMMUNITY
Start: 2017-08-10 | End: 2018-09-19 | Stop reason: ALTCHOICE

## 2017-11-01 NOTE — MR AVS SNAPSHOT
Visit Information Date & Time Provider Department Dept. Phone Encounter #  
 11/1/2017  9:50 AM Ana Mortensen  [phone] [id number] Follow-up Instructions Return in about 2 months (around 1/1/2018), or if symptoms worsen or fail to improve, for DM, vtiamin D. Your Appointments 12/26/2017 11:10 AM  
New Patient with MD [facility]) Appt Note: Np, diabetic, chelsea 10.16.17  
 [address] [place] [address] [address] [id number] [address] [facility] Upcoming Health Maintenance Date Due HEMOGLOBIN A1C Q6M 3/27/2018 LIPID PANEL Q1 4/28/2018 MEDICARE YEARLY EXAM 4/29/2018 FOBT Q 1 YEAR AGE 50-75 7/6/2018 FOOT EXAM Q1 8/22/2018 EYE EXAM RETINAL OR DILATED Q1 9/13/2018 Pneumococcal 65+ Low/Medium Risk (2 of 2 - PPSV23) 9/27/2018 MICROALBUMIN Q1 9/27/2018 BREAST CANCER SCRN MAMMOGRAM 8/2/2019 GLAUCOMA SCREENING Q2Y 9/13/2019 DTaP/Tdap/Td series (2 - Td) 4/27/2027 Allergies as of 11/1/2017  Review Complete On: 11/1/2017 By: Cary Britt MD  
  
 Severity Noted Reaction Type Reactions Compazine [Prochlorperazine Edisylate]  04/12/2010    Other (comments) Tongue swelling, drooling Naprosyn [Naproxen]  05/19/2017   Side Effect Other (comments) Belching,gas,stomach pain Current Immunizations  Reviewed on 11/1/2017 Name Date  
 TD Vaccine 4/13/2010  2:21 AM  
  
 Reviewed by Maria De Jesus Grigsby LPN on 31/7/0854 at  9:54 AM  
You Were Diagnosed With   
  
 Codes Comments Type 1 diabetes mellitus without complication (HCC)    -  Primary ICD-10-CM: E10.9 ICD-9-CM: 250.01 Vitamin D deficiency     ICD-10-CM: E55.9 ICD-9-CM: 268.9 Vitals BP Pulse Temp Resp Height(growth percentile) Weight(growth percentile) 120/79 (BP 1 Location: Right arm, BP Patient Position: Sitting) 77 97.6 °F (36.4 °C) (Oral) 18 4' 1\" (1.245 m) 133 lb (60.3 kg) SpO2 BMI OB Status Smoking Status 97% 38.95 kg/m2 Postmenopausal Never Smoker Vitals History BMI and BSA Data Body Mass Index Body Surface Area 38.95 kg/m 2 1.44 m 2 Preferred Pharmacy Pharmacy Name Phone [facility][id number] Serrano [address] [id number] [phone] Your Updated Medication List  
  
   
This list is accurate as of: 11/1/17 10:31 AM.  Always use your most recent med list.  
  
  
  
  
 [address] Generic drug:  Blood-Glucose Meter ACCU-CHEK CAROL PLUS TEST STRP strip Generic drug:  glucose blood VI test strips Testing blood sugar twice a day Dx E11.9 [address] [place] Generic drug:  Lancets Use to test blood sugar twice a day for Dx: E11.9  
  
 aspirin 325 mg tablet Commonly known as:  ASPIRIN Take 1 Tab by mouth daily. * B-D INSULIN SYRINGE LO-DOSE  
by Does Not Apply route. Use to inject insulin twice a day for * Insulin Syringe-Needle U-100 1/2 mL 28 gauge x 1/2\" Syrg Commonly known as:  BD LO-DOSE MICRO-FINE IV Use to inject insulin twice daily dx  
  
 insulin NPH/insulin regular 100 unit/mL (70-30) injection Commonly known as:  NOVOLIN 70/30, HUMULIN 70/30 Take sq 20 units with breakfast and 16 units with dinner. If BS>300, take 22 units in am and 18 units in pm.  
  
 Insulin Syringe-Needle, Dis Un 0.3 mL 30 gauge x 5/16\" Syrg Use to inject insulin twice a day for type 1 diabetes  
  
 losartan 50 mg tablet Commonly known as:  COZAAR  
TAKE 1 TABLET EVERY DAY  Indications: hypertension  
  
 meloxicam 7.5 mg tablet Commonly known as:  MOBIC Take 1 Tab by mouth daily as needed for Pain. Take with food. rosuvastatin 20 mg tablet Commonly known as:  CRESTOR  
 Take 1 Tab by mouth nightly. Indications: hyperlipidemia ZyrTEC 10 mg Cap Generic drug:  Cetirizine * Notice: This list has 2 medication(s) that are the same as other medications prescribed for you. Read the directions carefully, and ask your doctor or other care provider to review them with you. Follow-up Instructions Return in about 2 months (around 1/1/2018), or if symptoms worsen or fail to improve, for DM, vtiamin D. Patient Instructions Patient Instructions 1. Take insulin 70/30 20 units every morning. In the evening, take 16 units if you are home and 14 units if you are at work. Learning About Diabetes Food Guidelines Your Care Instructions Meal planning is important to manage diabetes. It helps keep your blood sugar at a target level (which you set with your doctor). You don't have to eat special foods. You can eat what your family eats, including sweets once in a while. But you do have to pay attention to how often you eat and how much you eat of certain foods. You may want to work with a dietitian or a certified diabetes educator (CDE) to help you plan meals and snacks. A dietitian or CDE can also help you lose weight if that is one of your goals. What should you know about eating carbs? Managing the amount of carbohydrate (carbs) you eat is an important part of healthy meals when you have diabetes. Carbohydrate is found in many foods. · Learn which foods have carbs. And learn the amounts of carbs in different foods. ¨ Bread, cereal, pasta, and rice have about 15 grams of carbs in a serving. A serving is 1 slice of bread (1 ounce), ½ cup of cooked cereal, or 1/3 cup of cooked pasta or rice. ¨ Fruits have 15 grams of carbs in a serving. A serving is 1 small fresh fruit, such as an apple or orange; ½ of a banana; ½ cup of cooked or canned fruit; ½ cup of fruit juice; 1 cup of melon or raspberries; or 2 tablespoons of dried fruit. ¨ Milk and no-sugar-added yogurt have 15 grams of carbs in a serving. A serving is 1 cup of milk or 2/3 cup of no-sugar-added yogurt. ¨ Starchy vegetables have 15 grams of carbs in a serving. A serving is ½ cup of mashed potatoes or sweet potato; 1 cup winter squash; ½ of a small baked potato; ½ cup of cooked beans; or ½ cup cooked corn or green peas. · Learn how much carbs to eat each day and at each meal. A dietitian or CDE can teach you how to keep track of the amount of carbs you eat. This is called carbohydrate counting. · If you are not sure how to count carbohydrate grams, use the Plate Method to plan meals. It is a good, quick way to make sure that you have a balanced meal. It also helps you spread carbs throughout the day. ¨ Divide your plate by types of foods. Put non-starchy vegetables on half the plate, meat or other protein food on one-quarter of the plate, and a grain or starchy vegetable in the final quarter of the plate. To this you can add a small piece of fruit and 1 cup of milk or yogurt, depending on how many carbs you are supposed to eat at a meal. 
· Try to eat about the same amount of carbs at each meal. Do not \"save up\" your daily allowance of carbs to eat at one meal. 
· Proteins have very little or no carbs per serving. Examples of proteins are beef, chicken, turkey, fish, eggs, tofu, cheese, cottage cheese, and peanut butter. A serving size of meat is 3 ounces, which is about the size of a deck of cards. Examples of meat substitute serving sizes (equal to 1 ounce of meat) are 1/4 cup of cottage cheese, 1 egg, 1 tablespoon of peanut butter, and ½ cup of tofu. How can you eat out and still eat healthy? · Learn to estimate the serving sizes of foods that have carbohydrate. If you measure food at home, it will be easier to estimate the amount in a serving of restaurant food.  
· If the meal you order has too much carbohydrate (such as potatoes, corn, or baked beans), ask to have a low-carbohydrate food instead. Ask for a salad or green vegetables. · If you use insulin, check your blood sugar before and after eating out to help you plan how much to eat in the future. · If you eat more carbohydrate at a meal than you had planned, take a walk or do other exercise. This will help lower your blood sugar. What else should you know? · Limit saturated fat, such as the fat from meat and dairy products. This is a healthy choice because people who have diabetes are at higher risk of heart disease. So choose lean cuts of meat and nonfat or low-fat dairy products. Use olive or canola oil instead of butter or shortening when cooking. · Don't skip meals. Your blood sugar may drop too low if you skip meals and take insulin or certain medicines for diabetes. · Check with your doctor before you drink alcohol. Alcohol can cause your blood sugar to drop too low. Alcohol can also cause a bad reaction if you take certain diabetes medicines. Follow-up care is a key part of your treatment and safety. Be sure to make and go to all appointments, and call your doctor if you are having problems. It's also a good idea to know your test results and keep a list of the medicines you take. Where can you learn more? Go to http://sammi-amanda.info/. Enter D709 in the search box to learn more about \"Learning About Diabetes Food Guidelines. \" Current as of: March 13, 2017 Content Version: 11.4 © 1813-3496 Healthwise, [employer]. Care instructions adapted under license by [employer] (which disclaims liability or warranty for this information). If you have questions about a medical condition or this instruction, always ask your healthcare professional. [address] any warranty or liability for your use of this information. Please provide this summary of care documentation to your next provider. Your primary care clinician is listed as Maxwell Charles. If you have any questions after today's visit, please call [phone].

## 2017-11-01 NOTE — PROGRESS NOTES
CC:   Chief Complaint   Patient presents with    Diabetes       HISTORY OF PRESENT ILLNESS  Rashad Breen is a 78 y.o. female. Presents for 1 month follow up on DM. She has type 1 DM, HTN, hyperlipidemia, and seasonal allergic rhinitis. She denies polydipsia or polyuria; denies hypoglycemic symptoms over past month. Has been taking 70/30 20 units every morning and 16 units if at home and 12 units if at work (eats later). Brought in BS log. FBS: 103-165, after dinner:  (usually in range of 170-270). Last A1c 9.5% on 9/27/17. BS usually still >200 when she works and eats late.     Soc Hx  . Has 1 son; no grandchildren. Lives with her son in a trailer home. Works at [employer]. Usually works on Wednesdays, Saturdays, and Sundays. Does not drive; walks to work. Never smoker. Denies alcohol or recreational drug use. Drinks a cup of coffee 2 times a day. Patient Active Problem List   Diagnosis Code    [facility] R87.0    Essential hypertension I10    Mixed hyperlipidemia E78.2    Allergic rhinitis J30.9    Vitamin D deficiency E55.9    Chronic left shoulder pain M25.512, G89.29    Primary osteoarthritis involving multiple joints M15.0    Obesity (BMI 30-39. 9) E66.9    Osteopenia M85.80    Depressed mood F32.9    Chronic pain syndrome G89.4     Past Medical History:   Diagnosis Date    Arthritis     Diabetes ([facility]     Hypercholesterolemia     Hypertension      Allergies   Allergen Reactions    Compazine [Prochlorperazine Edisylate] Other (comments)     Tongue swelling, drooling    Naprosyn [Naproxen] Other (comments)     Belching,gas,stomach pain     Current Outpatient Prescriptions   Medication Sig Dispense Refill    ZYRTEC 10 mg cap       meloxicam (MOBIC) 7.5 mg tablet Take 1 Tab by mouth daily as needed for Pain. Take with food.  30 Tab 3    losartan (COZAAR) 50 mg tablet TAKE 1 TABLET EVERY DAY  Indications: hypertension 90 Tab 3    Insulin Syringe-Needle, Dis Un 0.3 mL 30 gauge x 5/16\" syrg Use to inject insulin twice a day for type 1 diabetes 200 Syringe 3    insulin NPH/insulin regular (NOVOLIN 70/30, HUMULIN 70/30) 100 unit/mL (70-30) injection Take sq 20 units with breakfast and 16 units with dinner. If BS>300, take 22 units in am and 18 units in pm. 30 mL 3    Insulin Syringe-Needle U-100 (BD LO-DOSE MICRO-FINE IV) 1/2 mL 28 gauge x 1/2\" syrg Use to inject insulin twice daily dx 200 Syringe 3    ACCU-CHEK CAROL PLUS METER misc       SYRINGE-NEEDLE,INSULIN,0.5 ML (B-D INSULIN SYRINGE LO-DOSE) by Does Not Apply route. Use to inject insulin twice a day for      aspirin (ASPIRIN) 325 mg tablet Take 1 Tab by mouth daily. 30 Tab 5    rosuvastatin (CRESTOR) 20 mg tablet Take 1 Tab by mouth nightly. Indications: hyperlipidemia 90 Tab 3    ACCU-CHEK CAROL PLUS TEST STRP strip Testing blood sugar twice a day Dx E11.9 200 Strip 3    ACCU-CHEK SOFTCLIX LANCETS misc Use to test blood sugar twice a day for Dx: E11.9 200 Each 3         PHYSICAL EXAM  Visit Vitals    /79 (BP 1 Location: Right arm, BP Patient Position: Sitting)    Pulse 77    Temp 97.6 °F (36.4 °C) (Oral)    Resp 18    Ht 4' 1\" (1.245 m)    Wt 133 lb (60.3 kg)    SpO2 97%    BMI 38.95 kg/m2       General: Well-developed and well-nourished, no distress. HEENT:  Head normocephalic/atraumatic, no scleral icterus  Lungs:  Clear to ausculation bilaterally. Good air movement. Heart:  Regular rate and rhythm, normal S1 and S2, no murmur, gallop, or rub  Extremities: No clubbing, cyanosis, or edema. Neurological: Alert and oriented. Psychiatric: Normal mood and affect.  Behavior is normal.     Results for orders placed or performed in visit on 09/27/17   MICROALBUMIN, UR, RAND W/ MICROALBUMIN/CREA RATIO   Result Value Ref Range    Creatinine, urine 182.5 Not Estab. mg/dL    Microalbumin, urine 72.6 Not Estab. ug/mL    Microalb/Creat ratio (ug/mg creat.) 39.8 (H) 0.0 - 30.0 mg/g creat   AMB POC GLUCOSE BLOOD, BY GLUCOSE MONITORING DEVICE   Result Value Ref Range    Glucose  (A) 70 - 110 mg/dL   AMB POC HEMOGLOBIN A1C   Result Value Ref Range    Hemoglobin A1c (POC) 9.5 (A) 4.8 - 5.6 %         ASSESSMENT AND PLAN    ICD-10-CM ICD-9-CM    1. Type 1 diabetes mellitus without complication (HCC) W92.1 250.01    2. Vitamin D deficiency E55.9 268.9        Diagnoses and all orders for this visit:    1. Type 1 diabetes mellitus without complication [facility])  Keep scheduled appointment with Dr. Racquel Tilley on 12/26/17. Patient Instructions  1. Take insulin 70/30 20 units every morning. In the evening, take 16 units if you are home and 14 units if you are at work. 2. Vitamin D deficiency  Continue taking. She is waiting for [facility] to send refill. Follow-up Disposition:  Return in about 2 months (around 1/1/2018), or if symptoms worsen or fail to improve, for DM, vtiamin D. Provided patient and/or family with advanced directive information and answered pertinent questions. Encouraged patient to provide a copy of advanced directive to the office when available. I have discussed the diagnosis with the patient and the intended plan as seen in the above orders. Patient is in agreement. The patient has received an after-visit summary and questions were answered concerning future plans. I have discussed medication side effects and warnings with the patient as well.

## 2017-11-01 NOTE — PATIENT INSTRUCTIONS
Patient Instructions  1. Take insulin 70/30 20 units every morning. In the evening, take 16 units if you are home and 14 units if you are at work. Learning About Diabetes Food Guidelines  Your Care Instructions    Meal planning is important to manage diabetes. It helps keep your blood sugar at a target level (which you set with your doctor). You don't have to eat special foods. You can eat what your family eats, including sweets once in a while. But you do have to pay attention to how often you eat and how much you eat of certain foods. You may want to work with a dietitian or a certified diabetes educator (CDE) to help you plan meals and snacks. A dietitian or CDE can also help you lose weight if that is one of your goals. What should you know about eating carbs? Managing the amount of carbohydrate (carbs) you eat is an important part of healthy meals when you have diabetes. Carbohydrate is found in many foods. · Learn which foods have carbs. And learn the amounts of carbs in different foods. ¨ Bread, cereal, pasta, and rice have about 15 grams of carbs in a serving. A serving is 1 slice of bread (1 ounce), ½ cup of cooked cereal, or 1/3 cup of cooked pasta or rice. ¨ Fruits have 15 grams of carbs in a serving. A serving is 1 small fresh fruit, such as an apple or orange; ½ of a banana; ½ cup of cooked or canned fruit; ½ cup of fruit juice; 1 cup of melon or raspberries; or 2 tablespoons of dried fruit. ¨ Milk and no-sugar-added yogurt have 15 grams of carbs in a serving. A serving is 1 cup of milk or 2/3 cup of no-sugar-added yogurt. ¨ Starchy vegetables have 15 grams of carbs in a serving. A serving is ½ cup of mashed potatoes or sweet potato; 1 cup winter squash; ½ of a small baked potato; ½ cup of cooked beans; or ½ cup cooked corn or green peas. · Learn how much carbs to eat each day and at each meal. A dietitian or CDE can teach you how to keep track of the amount of carbs you eat.  This is called carbohydrate counting. · If you are not sure how to count carbohydrate grams, use the Plate Method to plan meals. It is a good, quick way to make sure that you have a balanced meal. It also helps you spread carbs throughout the day. ¨ Divide your plate by types of foods. Put non-starchy vegetables on half the plate, meat or other protein food on one-quarter of the plate, and a grain or starchy vegetable in the final quarter of the plate. To this you can add a small piece of fruit and 1 cup of milk or yogurt, depending on how many carbs you are supposed to eat at a meal.  · Try to eat about the same amount of carbs at each meal. Do not \"save up\" your daily allowance of carbs to eat at one meal.  · Proteins have very little or no carbs per serving. Examples of proteins are beef, chicken, turkey, fish, eggs, tofu, cheese, cottage cheese, and peanut butter. A serving size of meat is 3 ounces, which is about the size of a deck of cards. Examples of meat substitute serving sizes (equal to 1 ounce of meat) are 1/4 cup of cottage cheese, 1 egg, 1 tablespoon of peanut butter, and ½ cup of tofu. How can you eat out and still eat healthy? · Learn to estimate the serving sizes of foods that have carbohydrate. If you measure food at home, it will be easier to estimate the amount in a serving of restaurant food. · If the meal you order has too much carbohydrate (such as potatoes, corn, or baked beans), ask to have a low-carbohydrate food instead. Ask for a salad or green vegetables. · If you use insulin, check your blood sugar before and after eating out to help you plan how much to eat in the future. · If you eat more carbohydrate at a meal than you had planned, take a walk or do other exercise. This will help lower your blood sugar. What else should you know? · Limit saturated fat, such as the fat from meat and dairy products.  This is a healthy choice because people who have diabetes are at higher risk of heart disease. So choose lean cuts of meat and nonfat or low-fat dairy products. Use olive or canola oil instead of butter or shortening when cooking. · Don't skip meals. Your blood sugar may drop too low if you skip meals and take insulin or certain medicines for diabetes. · Check with your doctor before you drink alcohol. Alcohol can cause your blood sugar to drop too low. Alcohol can also cause a bad reaction if you take certain diabetes medicines. Follow-up care is a key part of your treatment and safety. Be sure to make and go to all appointments, and call your doctor if you are having problems. It's also a good idea to know your test results and keep a list of the medicines you take. Where can you learn more? Go to http://sammi-amanda.info/. Enter T618 in the search box to learn more about \"Learning About Diabetes Food Guidelines. \"  Current as of: March 13, 2017  Content Version: 11.4  © 4368-0231 Healthwise, [employer]. Care instructions adapted under license by [employer] (which disclaims liability or warranty for this information). If you have questions about a medical condition or this instruction, always ask your healthcare professional. [address] any warranty or liability for your use of this information.

## 2017-11-01 NOTE — PROGRESS NOTES
Zaira Flood    Chief Complaint   Patient presents with    Diabetes         1. Have you been to the ER, urgent care clinic since your last visit? Hospitalized since your last visit? NO    2. Have you seen or consulted any other health care providers outside of the [address] [place] [address] since your last visit? Include any pap smears or colon screening. NO    Patient does not have advance directive on file and has received paperwork. Reviewed record In preparation for visit, huddled with provider and have obtained  necessary documentation      Dr Shalini Mitchell notified of reason for visit and vitals    There are no preventive care reminders to display for this patient. Wt Readings from Last 3 Encounters:   11/01/17 133 lb (60.3 kg)   09/27/17 135 lb (61.2 kg)   06/21/17 131 lb 9.6 oz (59.7 kg)     Temp Readings from Last 3 Encounters:   11/01/17 97.6 °F (36.4 °C) (Oral)   09/27/17 98.5 °F (36.9 °C) (Oral)   06/21/17 98 °F (36.7 °C) (Oral)     BP Readings from Last 3 Encounters:   11/01/17 120/79   09/27/17 135/76   06/21/17 113/73     Pulse Readings from Last 3 Encounters:   11/01/17 77   09/27/17 60   06/21/17 (!) 58         Learning Assessment:  :     Learning Assessment 4/28/2017   PRIMARY LEARNER Patient   PRIMARY LANGUAGE ENGLISH   LEARNER PREFERENCE PRIMARY READING   ANSWERED BY patient   RELATIONSHIP SELF       Depression Screening:  :     PHQ over the last two weeks 4/28/2017   Little interest or pleasure in doing things Not at all   Feeling down, depressed or hopeless Several days   Total Score PHQ 2 1       Fall Risk Assessment:  :     Fall Risk Assessment, last 12 mths 6/21/2017   Able to walk? Yes   Fall in past 12 months? Yes   Fall with injury? No   Number of falls in past 12 months 1   Fall Risk Score 1       Abuse Screening:  :     Abuse Screening Questionnaire 4/28/2017   Do you ever feel afraid of your partner? N   Are you in a relationship with someone who physically or mentally threatens you? N   Is it safe for you to go home? Y           Patient is accompanied by self I have received verbal consent from Jenaro Shirley to discuss any/all medical information while they are present in the room. Medication reconciliation up to date and corrected with patient at this time.

## 2017-11-16 DIAGNOSIS — Z79.4 TYPE 2 DIABETES MELLITUS WITHOUT COMPLICATION, WITH LONG-TERM CURRENT USE OF INSULIN (HCC): ICD-10-CM

## 2017-11-16 DIAGNOSIS — E11.9 TYPE 2 DIABETES MELLITUS WITHOUT COMPLICATION, WITH LONG-TERM CURRENT USE OF INSULIN (HCC): ICD-10-CM

## 2017-11-16 RX ORDER — HUMAN INSULIN 100 [IU]/ML
INJECTION, SUSPENSION SUBCUTANEOUS
Qty: 40 ML | Refills: 0 | Status: SHIPPED | OUTPATIENT
Start: 2017-11-16 | End: 2018-04-06 | Stop reason: SDUPTHER

## 2018-02-21 ENCOUNTER — OFFICE VISIT (OUTPATIENT)
Dept: INTERNAL MEDICINE CLINIC | Age: 68
End: 2018-02-21

## 2018-02-21 ENCOUNTER — TELEPHONE (OUTPATIENT)
Dept: INTERNAL MEDICINE CLINIC | Age: 68
End: 2018-02-21

## 2018-02-21 VITALS
WEIGHT: 135 LBS | HEART RATE: 72 BPM | DIASTOLIC BLOOD PRESSURE: 73 MMHG | RESPIRATION RATE: 16 BRPM | HEIGHT: 58 IN | OXYGEN SATURATION: 95 % | BODY MASS INDEX: 28.34 KG/M2 | TEMPERATURE: 98.3 F | SYSTOLIC BLOOD PRESSURE: 120 MMHG

## 2018-02-21 DIAGNOSIS — M25.512 CHRONIC LEFT SHOULDER PAIN: ICD-10-CM

## 2018-02-21 DIAGNOSIS — E66.3 OVERWEIGHT (BMI 25.0-29.9): ICD-10-CM

## 2018-02-21 DIAGNOSIS — E78.2 MIXED HYPERLIPIDEMIA: ICD-10-CM

## 2018-02-21 DIAGNOSIS — M85.89 OSTEOPENIA OF MULTIPLE SITES: ICD-10-CM

## 2018-02-21 DIAGNOSIS — E55.9 VITAMIN D DEFICIENCY: ICD-10-CM

## 2018-02-21 DIAGNOSIS — I10 ESSENTIAL HYPERTENSION: ICD-10-CM

## 2018-02-21 DIAGNOSIS — L30.9 DERMATITIS: ICD-10-CM

## 2018-02-21 DIAGNOSIS — J30.2 ACUTE SEASONAL ALLERGIC RHINITIS, UNSPECIFIED TRIGGER: ICD-10-CM

## 2018-02-21 DIAGNOSIS — G89.29 CHRONIC LEFT SHOULDER PAIN: ICD-10-CM

## 2018-02-21 DIAGNOSIS — G25.0 ESSENTIAL TREMOR: ICD-10-CM

## 2018-02-21 DIAGNOSIS — M15.9 PRIMARY OSTEOARTHRITIS INVOLVING MULTIPLE JOINTS: ICD-10-CM

## 2018-02-21 DIAGNOSIS — E10.9 TYPE 1 DIABETES MELLITUS WITHOUT COMPLICATION (HCC): Primary | ICD-10-CM

## 2018-02-21 LAB — HBA1C MFR BLD HPLC: 9 % (ref 4.8–5.6)

## 2018-02-21 RX ORDER — DICLOFENAC SODIUM 10 MG/G
2 GEL TOPICAL 4 TIMES DAILY
Qty: 100 G | Refills: 5 | Status: SHIPPED | OUTPATIENT
Start: 2018-02-21 | End: 2018-05-23 | Stop reason: ALTCHOICE

## 2018-02-21 RX ORDER — TRIAMCINOLONE ACETONIDE 1 MG/G
CREAM TOPICAL 2 TIMES DAILY
Qty: 15 G | Refills: 0 | Status: SHIPPED | OUTPATIENT
Start: 2018-02-21 | End: 2018-05-23 | Stop reason: ALTCHOICE

## 2018-02-21 RX ORDER — LANCETS
EACH MISCELLANEOUS
Qty: 200 EACH | Refills: 3 | Status: SHIPPED | OUTPATIENT
Start: 2018-02-21 | End: 2018-03-07 | Stop reason: DRUGHIGH

## 2018-02-21 RX ORDER — FLUTICASONE PROPIONATE 50 MCG
2 SPRAY, SUSPENSION (ML) NASAL DAILY
Qty: 1 BOTTLE | Refills: 5 | Status: SHIPPED | OUTPATIENT
Start: 2018-02-21 | End: 2018-05-23 | Stop reason: ALTCHOICE

## 2018-02-21 RX ORDER — MELOXICAM 15 MG/1
15 TABLET ORAL DAILY
COMMUNITY
End: 2018-04-23 | Stop reason: SDUPTHER

## 2018-02-21 NOTE — PROGRESS NOTES
CC:   Chief Complaint   Patient presents with    Diabetes    Vitamin D Deficiency       HISTORY OF PRESENT ILLNESS  Margarita Nicole is a 78 y.o. female. Presents for 3 month follow up evaluation. She has type 1 DM, HTN, hyperlipidemia, and seasonal allergic rhinitis. She has no complaints. Today she complains of:  1) pain at shoulders, hands, and knees despite taking Mobic  2) bilateral foot pain  3) would like referrals to see Dr. Milly Kilpatrick on 3/8/18 and Dr. Homero Bloch on 3/9/18  4) tremor at both hands over the past few months  5) runny nose and sneezing not relieved with Zyrtec  6) mildly itchy rash across lower abdomen for past few weeks     Endocrine Review  She is seen for diabetes. Since last visit she reports no polyuria or polydipsia, no hypoglycemia, no significant changes. Home glucose monitoring: is performed regularly. Brought in BS log. FBS:  (one isolated 173), after dinner: 128-339 (usually in low to mid 200's). She reports medication compliance: compliant all of the time. Medication side effects: none. Diabetic diet compliance: compliant most of the time. Lab review: orders written for new lab studies as appropriate; see orders, A1c today is 9.0%. Eye exam: UTD. Cardiovascular Review  The patient has hypertension and hyperlipidemia. She reports taking medications as instructed, no medication side effects noted. Diet and Lifestyle: generally follows a low fat low cholesterol diet, generally follows a low sodium diet, no formal exercise but active during the day. Lab review: orders written for new lab studies as appropriate; see orders.       Soc Hx  . Has 1 son; no grandchildren. Lives with her son in a trailer home. Works at [employer]. Usually works on Wednesdays, Saturdays, and Sundays. Does not drive; walks to work. Never smoker. Denies alcohol or recreational drug use.  Drinks a cup of coffee twice daily.     ROS  A complete review of systems was performed and is negative except for those mentioned in the HPI. Patient Active Problem List   Diagnosis Code    Type 1 diabetes mellitus without complication ([facility] R51.0    Essential hypertension I10    Mixed hyperlipidemia E78.2    Allergic rhinitis J30.9    Vitamin D deficiency E55.9    Chronic left shoulder pain M25.512, G89.29    Primary osteoarthritis involving multiple joints M15.0    Obesity (BMI 30-39. 9) E66.9    Osteopenia M85.80    Depressed mood F32.9    Chronic pain syndrome G89.4     Past Medical History:   Diagnosis Date    Arthritis     Diabetes ([facility]     Hypercholesterolemia     Hypertension      Allergies   Allergen Reactions    Compazine [Prochlorperazine Edisylate] Other (comments)     Tongue swelling, drooling    Naprosyn [Naproxen] Other (comments)     Belching,gas,stomach pain    Percocet [Oxycodone-Acetaminophen] Other (comments)     Visual problems/seeings in triplicate per patient     Current Outpatient Prescriptions   Medication Sig Dispense Refill    meloxicam (MOBIC) 15 mg tablet Take 15 mg by mouth daily.  ergocalciferol (ERGOCALCIFEROL) 50,000 unit capsule Take 1 Cap by mouth every seven (7) days. 12 Cap 0    NOVOLIN 70/30 100 unit/mL (70-30) injection INJECT  20 UNITS SUBCUTANEOUSLY WITH BREAKFAST  AND  16 UNITS WITH DINNER 40 mL 0    ZYRTEC 10 mg cap       losartan (COZAAR) 50 mg tablet TAKE 1 TABLET EVERY DAY  Indications: hypertension 90 Tab 3    Insulin Syringe-Needle, Dis Un 0.3 mL 30 gauge x 5/16\" syrg Use to inject insulin twice a day for type 1 diabetes 200 Syringe 3    Insulin Syringe-Needle U-100 (BD LO-DOSE MICRO-FINE IV) 1/2 mL 28 gauge x 1/2\" syrg Use to inject insulin twice daily dx 200 Syringe 3    ACCU-CHEK CAROL PLUS METER misc       SYRINGE-NEEDLE,INSULIN,0.5 ML (B-D INSULIN SYRINGE LO-DOSE) by Does Not Apply route. Use to inject insulin twice a day for      aspirin (ASPIRIN) 325 mg tablet Take 1 Tab by mouth daily.  30 Tab 5    rosuvastatin (CRESTOR) 20 mg tablet Take 1 Tab by mouth nightly. Indications: hyperlipidemia 90 Tab 3    ACCU-CHEK CAROL PLUS TEST STRP strip Testing blood sugar twice a day Dx E11.9 200 Strip 3    ACCU-CHEK SOFTCLIX LANCETS misc Use to test blood sugar twice a day for Dx: E11.9 200 Each 3         PHYSICAL EXAM  Visit Vitals    /73 (BP 1 Location: Left arm, BP Patient Position: Sitting)    Pulse 72    Temp 98.3 °F (36.8 °C) (Oral)    Resp 16    Ht 4' 1\" (1.245 m)    Wt 135 lb (61.2 kg)    SpO2 95%    BMI 39.53 kg/m2       General: Overweight, no distress. HEENT:  Head normocephalic/atraumatic, no scleral icterus  Lungs:  Clear to ausculation bilaterally. Good air movement. Heart:  Regular rate and rhythm, normal S1 and S2, no murmur, gallop, or rub  Extremities: No clubbing, cyanosis, or edema. Normal ROM with crepitus at bilateral knees. Neurological: Alert and oriented. Psychiatric: Normal mood and affect. Behavior is normal.     Results for orders placed or performed in visit on 02/21/18   AMB POC HEMOGLOBIN A1C   Result Value Ref Range    Hemoglobin A1c (POC) 9.0 (A) 4.8 - 5.6 %           ASSESSMENT AND PLAN    ICD-10-CM ICD-9-CM    1. Type 1 diabetes mellitus without complication (HCC) M45.4 250.01 AMB POC HEMOGLOBIN A1C      HEMOGLOBIN A1C WITH EAG      ACCU-CHEK SOFTCLIX LANCETS misc      REFERRAL TO ENDOCRINOLOGY      REFERRAL TO PODIATRY   2. Essential hypertension P70 297.3 METABOLIC PANEL, COMPREHENSIVE      CBC WITH AUTOMATED DIFF   3. Dermatitis L30.9 692.9 triamcinolone acetonide (KENALOG) 0.1 % topical cream   4. Mixed hyperlipidemia E78.2 272.2 LIPID PANEL      TSH RFX ON ABNORMAL TO FREE T4   5. Acute seasonal allergic rhinitis, unspecified trigger J30.2 477.8 fluticasone (FLONASE) 50 mcg/actuation nasal spray   6. Chronic left shoulder pain M25.512 719.41     G89.29 338.29    7. Primary osteoarthritis involving multiple joints M15.0 715.09 diclofenac (VOLTAREN) 1 % gel   8.  Osteopenia of multiple sites M85.89 733.90    9. Essential tremor G25.0 333.1    10. Vitamin D deficiency E55.9 268.9 VITAMIN D, 25 HYDROXY   11. Overweight (BMI 25.0-29. 9) E66.3 278.02        Diagnoses and all orders for this visit:    1. Type 1 diabetes mellitus without complication (HCC)  Uncontrolled. A1c 9.0% today. -     AMB POC HEMOGLOBIN A1C  -     HEMOGLOBIN A1C WITH EAG  -     ACCU-CHEK SOFTCLIX LANCETS misc; Use to test blood sugar twice a day for Dx: E11.9  -     REFERRAL TO ENDOCRINOLOGY  -     REFERRAL TO PODIATRY    2. Essential hypertension  Controlled. Continue present management.  -     METABOLIC PANEL, COMPREHENSIVE  -     CBC WITH AUTOMATED DIFF    3. Dermatitis on abdomen  Likely contact dermatitis. -     Start triamcinolone acetonide (KENALOG) 0.1 % topical cream; Apply  to affected area two (2) times a day. use thin layer to abdomen. 4. Mixed hyperlipidemia  -     LIPID PANEL  -     TSH RFX ON ABNORMAL TO FREE T4    5. Acute seasonal allergic rhinitis, unspecified trigger  -     Start fluticasone (FLONASE) 50 mcg/actuation nasal spray; 2 Sprays by Both Nostrils route daily. Indications: Allergic Rhinitis    6. Chronic left shoulder pain    7. Primary osteoarthritis involving multiple joints  -     Start diclofenac (VOLTAREN) 1 % gel; Apply 2 g to affected area four (4) times daily. For shoulders, elbows, and knees. 8. Osteopenia of multiple sites    9. Essential tremor  Consider starting primidone if it worsens. 10. Vitamin D deficiency  -     VITAMIN D, 25 HYDROXY    11. Overweight (BMI 25.0-29. 9)  Discussed the patient's BMI with her. The BMI follow up plan is as follows: dietary management education, guidance, and counseling; encourage exercise; monitor weight; prescribed dietary intake. Follow up BMI in 3 months. Follow-up Disposition:  Return in about 3 months (around 5/21/2018), or if symptoms worsen or fail to improve, for DM, HTN, chronic pain.       Provided patient and/or family with advanced directive information and answered pertinent questions. Encouraged patient to provide a copy of advanced directive to the office when available. I have discussed the diagnosis with the patient and the intended plan as seen in the above orders. Patient is in agreement. The patient has received an after-visit summary and questions were answered concerning future plans. I have discussed medication side effects and warnings with the patient as well.

## 2018-02-21 NOTE — TELEPHONE ENCOUNTER
There is no prescription alternative, but Dr. Faustino Bell recommends OTC Aspercreme roll-on or Salonpas Lidocaine Plus Pain Relieving roll-on.

## 2018-02-21 NOTE — MR AVS SNAPSHOT
Mikki Velasco 
 
 
 [address] [facility] [address] [phone] Patient: Earnestine Weston MRN: [id number] FTO:[id number] Visit Information Date & Time Provider Department Dept. Phone Encounter #  
 2/21/2018  9:30 AM Lindsay Stokes, [address] [facility] [phone] [id number] Follow-up Instructions Return in about 3 months (around 5/21/2018), or if symptoms worsen or fail to improve, for DM, HTN, chronic pain. Your Appointments 3/7/2018 11:10 AM  
New Patient with MD Terence Preciado [facility]) Appt Note: Np, diabetic, chelsea 10.16.17; r/s    cdh      12/21/17  
 [address] [facility] [address] [id number] [address] [facility] Upcoming Health Maintenance Date Due HEMOGLOBIN A1C Q6M 3/27/2018 LIPID PANEL Q1 4/28/2018 MEDICARE YEARLY EXAM 4/29/2018 FOBT Q 1 YEAR AGE 50-75 7/6/2018 FOOT EXAM Q1 8/22/2018 EYE EXAM RETINAL OR DILATED Q1 9/13/2018 Pneumococcal 65+ Low/Medium Risk (2 of 2 - PPSV23) 9/27/2018 MICROALBUMIN Q1 9/27/2018 BREAST CANCER SCRN MAMMOGRAM 8/2/2019 GLAUCOMA SCREENING Q2Y 9/13/2019 DTaP/Tdap/Td series (2 - Td) 4/27/2027 Allergies as of 2/21/2018  Review Complete On: 2/21/2018 By: Lindsay Stokes MD  
  
 Severity Noted Reaction Type Reactions Compazine [Prochlorperazine Edisylate]  04/12/2010    Other (comments) Tongue swelling, drooling Naprosyn [Naproxen]  05/19/2017   Side Effect Other (comments) Belching,gas,stomach pain Percocet [Oxycodone-acetaminophen]  01/18/2018    Other (comments) Visual problems/seeings in triplicate per patient Current Immunizations  Reviewed on 11/1/2017 Name Date  
 TD Vaccine 4/13/2010  2:21 AM  
  
 Not reviewed this visit You Were Diagnosed With   
  
 Codes Comments Type 1 diabetes mellitus without complication (HCC)    -  Primary ICD-10-CM: E10.9 ICD-9-CM: 250.01 Essential hypertension     ICD-10-CM: I10 
ICD-9-CM: 401.9 Dermatitis     ICD-10-CM: L30.9 ICD-9-CM: 692.9 Mixed hyperlipidemia     ICD-10-CM: E78.2 ICD-9-CM: 272.2 Acute seasonal allergic rhinitis, unspecified trigger     ICD-10-CM: J30.2 ICD-9-CM: 477.8 Chronic left shoulder pain     ICD-10-CM: M25.512, G89.29 ICD-9-CM: 719.41, 338.29 Primary osteoarthritis involving multiple joints     ICD-10-CM: M15.0 ICD-9-CM: 715.09 Osteopenia of multiple sites     ICD-10-CM: M85.89 ICD-9-CM: 733.90 Obesity (BMI 30-39. 9)     ICD-10-CM: E66.9 ICD-9-CM: 278.00 Essential tremor     ICD-10-CM: G25.0 ICD-9-CM: 333.1 Vitamin D deficiency     ICD-10-CM: E55.9 ICD-9-CM: 268.9 Vitals BP Pulse Temp Resp Height(growth percentile) Weight(growth percentile) 120/73 (BP 1 Location: Left arm, BP Patient Position: Sitting) 72 98.3 °F (36.8 °C) (Oral) 16 4' 1\" (1.245 m) 135 lb (61.2 kg) SpO2 BMI OB Status Smoking Status 95% 39.53 kg/m2 Postmenopausal Never Smoker Vitals History BMI and BSA Data Body Mass Index Body Surface Area  
 39.53 kg/m 2 1.45 m 2 Preferred Pharmacy Pharmacy Name Phone [id number] [address] [address] [phone] Your Updated Medication List  
  
   
This list is accurate as of 2/21/18  9:45 AM.  Always use your most recent med list.  
  
  
  
  
 [address] Generic drug:  Blood-Glucose Meter ACCU-CHEK CAROL PLUS TEST STRP strip Generic drug:  glucose blood VI test strips Testing blood sugar twice a day Dx E11.9 [address] Generic drug:  Lancets Use to test blood sugar twice a day for Dx: E11.9  
  
 aspirin 325 mg tablet Commonly known as:  ASPIRIN Take 1 Tab by mouth daily. * B-D INSULIN SYRINGE LO-DOSE  
by Does Not Apply route. Use to inject insulin twice a day for * Insulin Syringe-Needle U-100 1/2 mL 28 gauge x 1/2\" Syrg Commonly known as:  BD LO-DOSE MICRO-FINE IV Use to inject insulin twice daily dx  
  
 diclofenac 1 % Gel Commonly known as:  VOLTAREN Apply 2 g to affected area four (4) times daily. For shoulders, elbows, and knees. ergocalciferol 50,000 unit capsule Commonly known as:  ERGOCALCIFEROL Take 1 Cap by mouth every seven (7) days. fluticasone 50 mcg/actuation nasal spray Commonly known as:  Ana Golds 2 Sprays by Both Nostrils route daily. Indications: Allergic Rhinitis Insulin Syringe-Needle, Dis Un 0.3 mL 30 gauge x 5/16\" Syrg Use to inject insulin twice a day for type 1 diabetes  
  
 losartan 50 mg tablet Commonly known as:  COZAAR  
TAKE 1 TABLET EVERY DAY  Indications: hypertension  
  
 meloxicam 15 mg tablet Commonly known as:  MOBIC Take 15 mg by mouth daily. NovoLIN 70/30 U-100 Insulin 100 unit/mL (70-30) injection Generic drug:  insulin NPH/insulin regular INJECT  20 UNITS SUBCUTANEOUSLY WITH BREAKFAST  AND  16 UNITS WITH DINNER  
  
 rosuvastatin 20 mg tablet Commonly known as:  CRESTOR Take 1 Tab by mouth nightly. Indications: hyperlipidemia  
  
 triamcinolone acetonide 0.1 % topical cream  
Commonly known as:  KENALOG Apply  to affected area two (2) times a day. use thin layer to abdomen. ZyrTEC 10 mg Cap Generic drug:  Cetirizine * Notice: This list has 2 medication(s) that are the same as other medications prescribed for you. Read the directions carefully, and ask your doctor or other care provider to review them with you. Prescriptions Sent to Pharmacy Refills ACCU-CHEK SOFTCLIX LANCETS misc 3 Sig: Use to test blood sugar twice a day for Dx: E11.9  Class: Normal  
 Pharmacy: [id number] [address] [address] Ph #: [phone]  
 triamcinolone acetonide (KENALOG) 0.1 % topical cream 0 Sig: Apply  to affected area two (2) times a day. use thin layer to abdomen. Class: Normal  
 Pharmacy: [id number] [place], [address] Ph #: [phone] Route: Topical  
 diclofenac (VOLTAREN) 1 % gel 5 Sig: Apply 2 g to affected area four (4) times daily. For shoulders, elbows, and knees. Class: Normal  
 Pharmacy: [id number], [address] Ph #: [phone] Route: Topical  
 fluticasone (FLONASE) 50 mcg/actuation nasal spray 5 Si Sprays by Both Nostrils route daily. Indications: Allergic Rhinitis Class: Normal  
 Pharmacy: [id number], [address] Ph #: [phone] Route: Both Nostrils We Performed the Following AMB POC HEMOGLOBIN A1C [30462 CPT(R)] CBC WITH AUTOMATED DIFF [88511 CPT(R)] HEMOGLOBIN A1C WITH EAG [23728 CPT(R)] LIPID PANEL [47767 CPT(R)] METABOLIC PANEL, COMPREHENSIVE [49079 CPT(R)] REFERRAL TO ENDOCRINOLOGY [NAV42 Custom] Comments:  
 Please evaluate patient for uncontrolled type 1 DM. Has appointment scheduled on 3/7/18. REFERRAL TO PODIATRY [REF90 Custom] Comments:  
 Evaluation and management of bilateral foot pain in diabetic patient. TSH RFX ON ABNORMAL TO FREE T4 [[id number] Custom] VITAMIN D, 25 HYDROXY [id number] CPT(R)] Follow-up Instructions Return in about 3 months (around 2018), or if symptoms worsen or fail to improve, for DM, HTN, chronic pain. Referral Information Referral ID Referred By Referred To  
  
 [id number] [facility], [address] [place] [address] [address] [place], [address] Phone: [phone] Fax: [phone] Visits Status Start Date End Date 1 New Request 2/21/18 2/21/19 If your referral has a status of pending review or denied, additional information will be sent to support the outcome of this decision. Referral ID Referred By Referred To  
 [id number] [facility], [address] [place] [address], [address] Radha Baldwin Phone: [phone] Fax: [phone] Visits Status Start Date End Date 1 New Request 2/21/18 2/21/19 If your referral has a status of pending review or denied, additional information will be sent to support the outcome of this decision. Patient Instructions Arthritis: Care Instructions Your Care Instructions Arthritis, also called osteoarthritis, is a breakdown of the cartilage that cushions your joints. When the cartilage wears down, your bones rub against each other. This causes pain and stiffness. Many people have some arthritis as they age. Arthritis most often affects the joints of the spine, hands, hips, knees, or feet. You can take simple measures to protect your joints, ease your pain, and help you stay active. Follow-up care is a key part of your treatment and safety. Be sure to make and go to all appointments, and call your doctor if you are having problems. It's also a good idea to know your test results and keep a list of the medicines you take. How can you care for yourself at home? · Stay at a healthy weight. Being overweight puts extra strain on your joints. · Talk to your doctor or physical therapist about exercises that will help ease joint pain. ¨ Stretch. You may enjoy gentle forms of yoga to help keep your joints and muscles flexible. ¨ Walk instead of jog. Other types of exercise that are less stressful on the joints include riding a bicycle, swimming, bryan chi, or water exercise. ¨ Lift weights. Strong muscles help reduce stress on your joints.  Stronger thigh muscles, for example, take some of the stress off of the knees and hips. Learn the right way to lift weights so you do not make joint pain worse. · Take your medicines exactly as prescribed. Call your doctor if you think you are having a problem with your medicine. · Take pain medicines exactly as directed. ¨ If the doctor gave you a prescription medicine for pain, take it as prescribed. ¨ If you are not taking a prescription pain medicine, ask your doctor if you can take an over-the-counter medicine. · Use a cane, crutch, walker, or another device if you need help to get around. These can help rest your joints. You also can use other things to make life easier, such as a higher toilet seat and padded handles on kitchen utensils. · Do not sit in low chairs, which can make it hard to get up. · Put heat or cold on your sore joints as needed. Use whichever helps you most. You also can take turns with hot and cold packs. ¨ Apply heat 2 or 3 times a day for 20 to 30 minutes-using a heating pad, hot shower, or hot pack-to relieve pain and stiffness. ¨ Put ice or a cold pack on your sore joint for 10 to 20 minutes at a time. Put a thin cloth between the ice and your skin. When should you call for help? Call your doctor now or seek immediate medical care if: 
? · You have sudden swelling, warmth, or pain in any joint. ? · You have joint pain and a fever or rash. ? · You have such bad pain that you cannot use a joint. ? Watch closely for changes in your health, and be sure to contact your doctor if: 
? · You have mild joint symptoms that continue even with more than 6 weeks of care at home. ? · You have stomach pain or other problems with your medicine. Where can you learn more? Go to http://sammi-amanda.info/. Enter I928 in the search box to learn more about \"Arthritis: Care Instructions. \" Current as of: October 31, 2016 Content Version: 11.4 © 0856-1901 [employer].  Care instructions adapted under license by [address] (which disclaims liability or warranty for this information). If you have questions about a medical condition or this instruction, always ask your healthcare professional. [address] any warranty or liability for your use of this information. Please provide this summary of care documentation to your next provider. Your primary care clinician is listed as Stephanie Lozano. If you have any questions after today's visit, please call [phone].

## 2018-02-21 NOTE — TELEPHONE ENCOUNTER
Diclofenac gel not a covered medication. Alternative needed. No suggestions/substitutions given.  [facility]ses [facility]

## 2018-02-21 NOTE — PATIENT INSTRUCTIONS
Arthritis: Care Instructions  Your Care Instructions  Arthritis, also called osteoarthritis, is a breakdown of the cartilage that cushions your joints. When the cartilage wears down, your bones rub against each other. This causes pain and stiffness. Many people have some arthritis as they age. Arthritis most often affects the joints of the spine, hands, hips, knees, or feet. You can take simple measures to protect your joints, ease your pain, and help you stay active. Follow-up care is a key part of your treatment and safety. Be sure to make and go to all appointments, and call your doctor if you are having problems. It's also a good idea to know your test results and keep a list of the medicines you take. How can you care for yourself at home? · Stay at a healthy weight. Being overweight puts extra strain on your joints. · Talk to your doctor or physical therapist about exercises that will help ease joint pain. ¨ Stretch. You may enjoy gentle forms of yoga to help keep your joints and muscles flexible. ¨ Walk instead of jog. Other types of exercise that are less stressful on the joints include riding a bicycle, swimming, bryan chi, or water exercise. ¨ Lift weights. Strong muscles help reduce stress on your joints. Stronger thigh muscles, for example, take some of the stress off of the knees and hips. Learn the right way to lift weights so you do not make joint pain worse. · Take your medicines exactly as prescribed. Call your doctor if you think you are having a problem with your medicine. · Take pain medicines exactly as directed. ¨ If the doctor gave you a prescription medicine for pain, take it as prescribed. ¨ If you are not taking a prescription pain medicine, ask your doctor if you can take an over-the-counter medicine. · Use a cane, crutch, walker, or another device if you need help to get around. These can help rest your joints.  You also can use other things to make life easier, such as a higher toilet seat and padded handles on kitchen utensils. · Do not sit in low chairs, which can make it hard to get up. · Put heat or cold on your sore joints as needed. Use whichever helps you most. You also can take turns with hot and cold packs. ¨ Apply heat 2 or 3 times a day for 20 to 30 minutes-using a heating pad, hot shower, or hot pack-to relieve pain and stiffness. ¨ Put ice or a cold pack on your sore joint for 10 to 20 minutes at a time. Put a thin cloth between the ice and your skin. When should you call for help? Call your doctor now or seek immediate medical care if:  ? · You have sudden swelling, warmth, or pain in any joint. ? · You have joint pain and a fever or rash. ? · You have such bad pain that you cannot use a joint. ? Watch closely for changes in your health, and be sure to contact your doctor if:  ? · You have mild joint symptoms that continue even with more than 6 weeks of care at home. ? · You have stomach pain or other problems with your medicine. Where can you learn more? Go to http://sammi-amanda.info/. Enter C726 in the search box to learn more about \"Arthritis: Care Instructions. \"  Current as of: October 31, 2016  Content Version: 11.4  © 4237-6783 [employer]. Care instructions adapted under license by [employer] (which disclaims liability or warranty for this information). If you have questions about a medical condition or this instruction, always ask your healthcare professional. [place] [address] any warranty or liability for your use of this information.

## 2018-02-21 NOTE — PROGRESS NOTES
Dannie Weston  Identified pt with two pt identifiers(name and ). Chief Complaint   Patient presents with    Diabetes    Vitamin D Deficiency    Results       1. Have you been to the ER, urgent care clinic since your last visit? Hospitalized since your last visit? NO    2. Have you seen or consulted any other health care providers outside of the [address] [place] [address] since your last visit? Include any pap smears or colon screening. NO    Today's provider has been notified of reason for visit, vitals and flowsheets obtained on patients. Patient received paperwork for advance directive during previous visit but has not completed at this time     Reviewed record In preparation for visit, huddled with provider and have obtained necessary documentation      There are no preventive care reminders to display for this patient. Wt Readings from Last 3 Encounters:   18 135 lb (61.2 kg)   17 133 lb (60.3 kg)   17 135 lb (61.2 kg)     Temp Readings from Last 3 Encounters:   17 97.6 °F (36.4 °C) (Oral)   17 98.5 °F (36.9 °C) (Oral)   17 98 °F (36.7 °C) (Oral)     BP Readings from Last 3 Encounters:   17 120/79   17 135/76   17 113/73     Pulse Readings from Last 3 Encounters:   17 77   17 60   17 (!) 58     Vitals:    18 0908   Weight: 135 lb (61.2 kg)   Height: 4' 1\" (1.245 m)         Learning Assessment:  :     Learning Assessment 2017   PRIMARY LEARNER Patient   PRIMARY LANGUAGE ENGLISH   LEARNER PREFERENCE PRIMARY READING   ANSWERED BY patient   RELATIONSHIP SELF       Depression Screening:  :     PHQ over the last two weeks 2017   Little interest or pleasure in doing things Not at all   Feeling down, depressed or hopeless Several days   Total Score PHQ 2 1       Fall Risk Assessment:  :     Fall Risk Assessment, last 12 mths 2018   Able to walk? Yes   Fall in past 12 months?  No   Fall with injury? -   Number of falls in past 12 months -   Fall Risk Score -       Abuse Screening:  :     Abuse Screening Questionnaire 4/28/2017   Do you ever feel afraid of your partner? N   Are you in a relationship with someone who physically or mentally threatens you? N   Is it safe for you to go home? Y       ADL Screening:  :     ADL Assessment 4/28/2017   Feeding yourself No Help Needed   Getting from bed to chair No Help Needed   Getting dressed No Help Needed   Bathing or showering No Help Needed   Walk across the room (includes cane/walker) No Help Needed   Using the telphone No Help Needed   Taking your medications No Help Needed   Preparing meals No Help Needed   Managing money (expenses/bills) No Help Needed   Moderately strenuous housework (laundry) No Help Needed   Shopping for personal items (toiletries/medicines) No Help Needed   Shopping for groceries No Help Needed   Driving No Help Needed   Climbing a flight of stairs No Help Needed   Getting to places beyond walking distances No Help Needed         Per Dr. Luis June verbal order read back orders placed for a1c. Medication reconciliation up to date and corrected with patient at this time.

## 2018-02-21 NOTE — LETTER
[address] 
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 She has chronic left shoulder pain and primary osteoarthritis at multiple joints, both of which are exacerbated by lifting and unloading items from trucks. Let me know if you have any questions or concerns. Thank you. Sincerely, 
 
 
 
 
Dr. Mandeep Kebede. Eliud Mcmanus M.D. Internal Medicine

## 2018-02-22 LAB
25(OH)D3+25(OH)D2 SERPL-MCNC: 39.9 NG/ML (ref 30–100)
ALBUMIN SERPL-MCNC: 4.7 G/DL (ref 3.6–4.8)
ALBUMIN/GLOB SERPL: 2.4 {RATIO} (ref 1.2–2.2)
ALP SERPL-CCNC: 69 IU/L (ref 39–117)
ALT SERPL-CCNC: 28 IU/L (ref 0–32)
AST SERPL-CCNC: 33 IU/L (ref 0–40)
BASOPHILS # BLD AUTO: 0 X10E3/UL (ref 0–0.2)
BASOPHILS NFR BLD AUTO: 1 %
BILIRUB SERPL-MCNC: 0.8 MG/DL (ref 0–1.2)
BUN SERPL-MCNC: 18 MG/DL (ref 8–27)
BUN/CREAT SERPL: 23 (ref 12–28)
CALCIUM SERPL-MCNC: 10 MG/DL (ref 8.7–10.3)
CHLORIDE SERPL-SCNC: 99 MMOL/L (ref 96–106)
CHOLEST SERPL-MCNC: 154 MG/DL (ref 100–199)
CO2 SERPL-SCNC: 29 MMOL/L (ref 18–29)
CREAT SERPL-MCNC: 0.8 MG/DL (ref 0.57–1)
EOSINOPHIL # BLD AUTO: 0.3 X10E3/UL (ref 0–0.4)
EOSINOPHIL NFR BLD AUTO: 5 %
ERYTHROCYTE [DISTWIDTH] IN BLOOD BY AUTOMATED COUNT: 13.9 % (ref 12.3–15.4)
EST. AVERAGE GLUCOSE BLD GHB EST-MCNC: 220 MG/DL
GFR SERPLBLD CREATININE-BSD FMLA CKD-EPI: 77 ML/MIN/{1.73_M2}
GFR SERPLBLD CREATININE-BSD FMLA CKD-EPI: 89 ML/MIN/{1.73_M2}
GLOBULIN SER CALC-MCNC: 2 G/L (ref 1.5–4.5)
GLUCOSE SERPL-MCNC: 103 MG/DL (ref 65–99)
HBA1C MFR BLD: 9.3 % (ref 4.8–5.6)
HCT VFR BLD AUTO: 42 % (ref 34–46.6)
HDLC SERPL-MCNC: 69 MG/DL
HGB BLD-MCNC: 14.1 G/DL (ref 11.1–15.9)
IMM GRANULOCYTES # BLD: 0 X10E3/UL (ref 0–0.1)
IMM GRANULOCYTES NFR BLD: 0 %
INTERPRETATION, 910389: NORMAL
LDLC SERPL CALC-MCNC: 73 MG/DL (ref 0–99)
LYMPHOCYTES # BLD AUTO: 2.3 X10E3/UL (ref 0.7–3.1)
LYMPHOCYTES NFR BLD AUTO: 36 %
Lab: NORMAL
MCH RBC QN AUTO: 30.3 PG (ref 26.6–33)
MCHC RBC AUTO-ENTMCNC: 33.6 G/DL (ref 31.5–35.7)
MCV RBC AUTO: 90 FL (ref 79–97)
MONOCYTES # BLD AUTO: 0.7 X10E3/UL (ref 0.1–0.9)
MONOCYTES NFR BLD AUTO: 10 %
NEUTROPHILS # BLD AUTO: 3.1 X10E3/UL (ref 1.4–7)
NEUTROPHILS NFR BLD AUTO: 48 %
PLATELET # BLD AUTO: 218 X10E3/UL (ref 150–379)
POTASSIUM SERPL-SCNC: 4.4 MMOL/L (ref 3.5–5.2)
PROT SERPL-MCNC: 6.7 G/DL (ref 6–8.5)
RBC # BLD AUTO: 4.66 X10E6/UL (ref 3.77–5.28)
SODIUM SERPL-SCNC: 143 MMOL/L (ref 134–144)
TRIGL SERPL-MCNC: 62 MG/DL (ref 0–149)
TSH SERPL DL<=0.005 MIU/L-ACNC: 0.93 UIU/ML (ref 0.45–4.5)
VLDLC SERPL CALC-MCNC: 12 MG/DL (ref 5–40)
WBC # BLD AUTO: 6.4 X10E3/UL (ref 3.4–10.8)

## 2018-02-23 ENCOUNTER — DOCUMENTATION ONLY (OUTPATIENT)
Dept: INTERNAL MEDICINE CLINIC | Age: 68
End: 2018-02-23

## 2018-02-27 NOTE — PROGRESS NOTES
Your labs showed normal kidney and liver tests, blood counts, thyroid, vitamin D, and cholesterol levels. The A1c on your blood test was 9.3%. Be sure to keep your scheduled appt with Dr. Yayo Kaur, the diabetes specialist, on 3/7/18. Take your blood sugar record in with you.

## 2018-03-07 ENCOUNTER — OFFICE VISIT (OUTPATIENT)
Dept: ENDOCRINOLOGY | Age: 68
End: 2018-03-07

## 2018-03-07 VITALS
HEIGHT: 58 IN | SYSTOLIC BLOOD PRESSURE: 130 MMHG | WEIGHT: 135.3 LBS | BODY MASS INDEX: 28.4 KG/M2 | DIASTOLIC BLOOD PRESSURE: 82 MMHG | HEART RATE: 66 BPM

## 2018-03-07 DIAGNOSIS — E11.9 TYPE 2 DIABETES MELLITUS WITHOUT COMPLICATION, WITH LONG-TERM CURRENT USE OF INSULIN (HCC): ICD-10-CM

## 2018-03-07 DIAGNOSIS — E78.5 HYPERLIPIDEMIA, UNSPECIFIED HYPERLIPIDEMIA TYPE: ICD-10-CM

## 2018-03-07 DIAGNOSIS — I10 ESSENTIAL HYPERTENSION WITH GOAL BLOOD PRESSURE LESS THAN 130/80: ICD-10-CM

## 2018-03-07 DIAGNOSIS — E10.9 TYPE 1 DIABETES MELLITUS WITHOUT COMPLICATION (HCC): Primary | ICD-10-CM

## 2018-03-07 DIAGNOSIS — Z79.4 TYPE 2 DIABETES MELLITUS WITHOUT COMPLICATION, WITH LONG-TERM CURRENT USE OF INSULIN (HCC): ICD-10-CM

## 2018-03-07 RX ORDER — LANCETS
EACH MISCELLANEOUS
Qty: 400 EACH | Refills: 3 | Status: SHIPPED | OUTPATIENT
Start: 2018-03-07 | End: 2019-06-27 | Stop reason: SDUPTHER

## 2018-03-07 RX ORDER — BLOOD SUGAR DIAGNOSTIC
STRIP MISCELLANEOUS
Qty: 400 STRIP | Refills: 3 | Status: SHIPPED | OUTPATIENT
Start: 2018-03-07 | End: 2019-01-17 | Stop reason: SDUPTHER

## 2018-03-07 NOTE — PROGRESS NOTES
CONSULTATION REQUESTED BY: Carmen Jhaveri MD     REASON FOR CONSULT:  Uncontrolled type 1 diabetes    CHIEF COMPLAINT: Blood glucose is high    HISTORY OF PRESENT ILLNESS:   Gian Watson is a 78 y.o. female with a PMHx as noted below who was referred to our endocrinology clinic for evaluation of uncontrolled type 1 diabetes.     Diabetes History:  Diabetes was diagnosed in the 66's  Family History of diabetes is positive in both grandmothers  Last A1c prior to initial visit was 9.3% a couple weeks ago,  Regimen at time of establishing care includes  - novolin 70/30  20 units with breakfast, 16 units with dinner (reports due to cost)    Review of home glucose:  AM  120-150 mostly, rare 200  Lunch  Dinner  Bedtime 150-250  (70 when she works)    Review of most recent diabetes-related labs:  Lab Results   Component Value Date    HBA1C 9.3 (H) 02/21/2018    HBA1C 8.6 (H) 04/28/2017    HBA1C 7.8 (H) 07/13/2016    VRI0JOBG 9.0 (A) 02/21/2018    HNN5RYQZ 9.5 (A) 09/27/2017    CHOL 154 02/21/2018    LDLC 73 02/21/2018    GFRAA 89 02/21/2018    GFRNA 77 02/21/2018    MCACR 39.8 (H) 09/27/2017    TSH 0.925 02/21/2018    VITD3 39.9 02/21/2018     Lab Key:  [id number] = IA-2 pancreatic islet cell autoantibody  GADLT = FELI-65 autoantibody   MCACR = Urine Microalbumin  INSUL = Insulin level  CPEPL = C-peptide level      PAST MEDICAL/SURGICAL HISTORY:   Past Medical History:   Diagnosis Date    Arthritis     Diabetes ([facility] [address]     Hypercholesterolemia     Hypertension      Past Surgical History:   Procedure Laterality Date    EYE SURG ANT SGMT PROC UNLISTED      HX TUBAL LIGATION         ALLERGIES:   Allergies   Allergen Reactions    Compazine [Prochlorperazine Edisylate] Other (comments)     Tongue swelling, drooling    Naprosyn [Naproxen] Other (comments)     Belching,gas,stomach pain    Percocet [Oxycodone-Acetaminophen] Other (comments)     Visual problems/seeings in triplicate per patient       MEDICATIONS ON ADMISSION: Current Outpatient Prescriptions:     meloxicam (MOBIC) 15 mg tablet, Take 15 mg by mouth daily. , Disp: , Rfl:     ACCU-CHEK SOFTCLIX LANCETS [facility], Use to test blood sugar twice a day for Dx: E11.9, Disp: 200 Each, Rfl: 3    triamcinolone acetonide (KENALOG) 0.1 % topical cream, Apply  to affected area two (2) times a day. use thin layer to abdomen., Disp: 15 g, Rfl: 0    diclofenac (VOLTAREN) 1 % gel, Apply 2 g to affected area four (4) times daily. For shoulders, elbows, and knees. , Disp: 100 g, Rfl: 5    fluticasone (FLONASE) 50 mcg/actuation nasal spray, 2 Sprays by Both Nostrils route daily. Indications: Allergic Rhinitis, Disp: 1 Bottle, Rfl: 5    ergocalciferol (ERGOCALCIFEROL) 50,000 unit capsule, Take 1 Cap by mouth every seven (7) days. , Disp: 12 Cap, Rfl: 0    NOVOLIN 70/30 100 unit/mL (70-30) injection, INJECT  20 UNITS SUBCUTANEOUSLY WITH BREAKFAST  AND  16 UNITS WITH DINNER, Disp: 40 mL, Rfl: 0    ZYRTEC 10 mg cap, , Disp: , Rfl:     losartan (COZAAR) 50 mg tablet, TAKE 1 TABLET EVERY DAY  Indications: hypertension, Disp: 90 Tab, Rfl: 3    Insulin Syringe-Needle, Dis Un 0.3 mL 30 gauge x 5/16\" syrg, Use to inject insulin twice a day for type 1 diabetes, Disp: 200 Syringe, Rfl: 3    Insulin Syringe-Needle U-100 (BD LO-DOSE MICRO-FINE IV) 1/2 mL 28 gauge x 1/2\" syrg, Use to inject insulin twice daily dx, Disp: 200 Syringe, Rfl: 3    ACCU-CHEK CAROL PLUS METER mis, , Disp: , Rfl:     SYRINGE-NEEDLE,INSULIN,0.5 ML (B-D INSULIN SYRINGE LO-DOSE), by Does Not Apply route. Use to inject insulin twice a day for, Disp: , Rfl:     aspirin (ASPIRIN) 325 mg tablet, Take 1 Tab by mouth daily. , Disp: 30 Tab, Rfl: 5    rosuvastatin (CRESTOR) 20 mg tablet, Take 1 Tab by mouth nightly.  Indications: hyperlipidemia, Disp: 90 Tab, Rfl: 3    ACCU-CHEK CAROL PLUS TEST STRP strip, Testing blood sugar twice a day Dx E11.9, Disp: 200 Strip, Rfl: 3    SOCIAL HISTORY:   Social History     Social History    Marital status:      Spouse name: N/A    Number of children: N/A    Years of education: N/A     Occupational History    Not on file. Social History Main Topics    Smoking status: Never Smoker    Smokeless tobacco: Never Used    Alcohol use No    Drug use: No    Sexual activity: Not on file     Other Topics Concern    Not on file     Social History Narrative       FAMILY HISTORY:  Family History   Problem Relation Age of Onset    Cancer Mother     Diabetes Sister     Heart Disease Sister     Diabetes Brother     Heart Disease Brother     Diabetes Maternal Grandmother     Diabetes Paternal Grandmother        REVIEW OF SYSTEMS: Complete ROS assessed and noted for that which is described above, all else are negative. Eyes: normal  ENT: normal  CVS: normal  Resp: normal  GI: normal  : normal  GYN: normal  Endocrine: normal  Integument: normal  Musculoskeletal: normal  Neuro: normal  Psych: normal      PHYSICAL EXAMINATION:    VITAL SIGNS:  Visit Vitals    /82 (BP 1 Location: Left arm, BP Patient Position: Sitting)    Pulse 66    Ht 4' 10\" (1.473 m)    Wt 135 lb 4.8 oz (61.4 kg)    BMI 28.28 kg/m2       GENERAL: NCAT, Sitting comfortably, NAD  EYES: EOMI, non-icteric, no proptosis  Ear/Nose/Throat: NCAT, no inflammation, no masses  LYMPH NODES: No LAD  CARDIOVASCULAR: S1 S2, RRR, No murmur, 2+ radial pulses  RESPIRATORY: CTA b/l, no wheeze/rales  GASTROINTESTINAL: NT, ND  MUSCULOSKELETAL: Normal ROM, no atrophy  SKIN: warm, no edema/rash/ or other skin changes  NEUROLOGIC: 5/5 power all extremities, no tremor, AAOx3  PSYCHIATRIC: Normal affect, Normal insight and judgement    REVIEW OF LABORATORY AND RADIOLOGY DATA:   Labs and documentation have been reviewed as described above. ASSESSMENT AND PLAN:   Jonel Andersen is a 78 y.o. female with a PMHx as noted above who was referred to our endocrinology clinic for evaluation of uncontrolled type 1 diabetes.      Problems:  Type 1 diabetes Uncontrolled  Hyperlipidemia  Hypertension    We had the pleasure of reviewing together the basics of diabetes including basic pathophysiology and diabetes care. We further discussed the importance of checking home glucose regularly and taking all of their scheduled medications in order to have the best possible outcome. I was able to answer any questions they had in clinic today and they are invited to reach me if they have any further questions. Based upon our discussion together today we have decided to make the following changes:    I discussed switching to basal bolus insulin,  them for better diabetes control however she does not want to inject insulin 4 times per day. 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 She describes walking home from work with a blood sugar of 13 at one time also, which I am not so sure is even compatible with life. We have to be very cautious. We discussed customizing her regimen for days when she is working and sugars get lower. Needs to start checking her blood sugar 3 times per day to include fasting AM,   Dinnertime, and Bedtime, in order for us to customize her regimen and have knowledge of both her daytime sugars as a result of her AM dose, and her overnight blood sugars as a result of her dinner dose. PLAN  Type 1 Diabetes  Medications:  Novolin 70/30 insulin:    Work days:         24 units breakfast, 10 units with dinner   Non-work days:  24 units breakfast, 16 units with dinner  Advised to check glucose  qAM/Dinner/Bedtime  Provided with new patient welcome and instruction letter,  Provided with glucose log sheets for later review. HTN: reviewed blood pressure, stable on losartan  HLD: reviewed fasting lipids, stable on crestor    RTC: I would like to see them back in 3 months.   >60 minutes spent together with patient today of which >50% of this time was spent in counseling and coordination of care. Shelli Treadwell.  Bernard Wharton MD  [address] [place] [address] Endocrinology

## 2018-03-07 NOTE — PATIENT INSTRUCTIONS
Novolin 70/30 insulin:    Work days:         24 units breakfast, 10 units with dinner   Non-work days:  24 units breakfast, 16 units with dinner    ----------------------------------------------------------------------------------------------------------------------    Below you will find a glucose log sheet which you can use to record your blood sugars. Without checking and recording what your home glucose levels are, it will be difficult to make any changes to your medication dose, even when significant changes may be needed. Please feel free to use the log below to record your home glucose levels. At the very least, I would like for you to login the entire 2-3 weeks just before your visit so we can make your visit much more productive and beneficial to you. GLUCOSE LOG SHEET:    Date Breakfast Lunch Dinner Bedtime Comments ? GLUCOSE LOG SHEET:    Date Breakfast Lunch Dinner Bedtime Comments ? GLUCOSE LOG SHEET:    Date Breakfast Lunch Dinner Bedtime Comments ?

## 2018-03-08 ENCOUNTER — TELEPHONE (OUTPATIENT)
Dept: ENDOCRINOLOGY | Age: 68
End: 2018-03-08

## 2018-03-08 NOTE — TELEPHONE ENCOUNTER
If her sugar is <100 she can take half the dose,  If her sugar is < 70 she should hold the dose and first drink about 4 oz of juice to get her sugar back up. Thanks,    Gaye Manzano.  [address] Endocrinology  [address] [place] [address]

## 2018-03-08 NOTE — TELEPHONE ENCOUNTER
Patient called to ask how much insulin she should take if her blood sugar drops below 100 ? She can be reached at:  [phone].

## 2018-03-09 NOTE — TELEPHONE ENCOUNTER
I called Ms. Weston back and relayed the message from Dr. Charmayne Malm. She wrote this down and understood the information.   Ami Rebollar

## 2018-04-05 ENCOUNTER — TELEPHONE (OUTPATIENT)
Dept: ENDOCRINOLOGY | Age: 68
End: 2018-04-05

## 2018-04-05 DIAGNOSIS — Z79.4 TYPE 2 DIABETES MELLITUS WITHOUT COMPLICATION, WITH LONG-TERM CURRENT USE OF INSULIN (HCC): ICD-10-CM

## 2018-04-05 DIAGNOSIS — E11.9 TYPE 2 DIABETES MELLITUS WITHOUT COMPLICATION, WITH LONG-TERM CURRENT USE OF INSULIN (HCC): ICD-10-CM

## 2018-04-05 NOTE — TELEPHONE ENCOUNTER
Patient called to get a refill on her Novolin 70/30 insulin. A different provider was filling this medication for her. She can be reached at:  [phone].       [facility]   [phone]  Novolin 70/30

## 2018-04-06 DIAGNOSIS — E11.9 TYPE 2 DIABETES MELLITUS WITHOUT COMPLICATION, WITH LONG-TERM CURRENT USE OF INSULIN (HCC): ICD-10-CM

## 2018-04-06 DIAGNOSIS — Z79.4 TYPE 2 DIABETES MELLITUS WITHOUT COMPLICATION, WITH LONG-TERM CURRENT USE OF INSULIN (HCC): ICD-10-CM

## 2018-04-09 RX ORDER — ERGOCALCIFEROL 1.25 MG/1
50000 CAPSULE ORAL
Qty: 12 CAP | Refills: 0 | Status: SHIPPED | OUTPATIENT
Start: 2018-04-09 | End: 2018-09-19 | Stop reason: ALTCHOICE

## 2018-04-09 NOTE — TELEPHONE ENCOUNTER
REFILL     PCP: Jaye Ng MD     Last appt: Visit date not found   Future Appointments  Date Time Provider Adry Mcnamarai   4/13/2018 12:10 PM Charly Strauss MD RDE [address]   5/23/2018 9:50 AM Jaye Ng MD [facility]        Requested Prescriptions     Pending Prescriptions Disp Refills    ergocalciferol (ERGOCALCIFEROL) 50,000 unit capsule 12 Cap 0     Sig: Take 1 Cap by mouth every seven (7) days.

## 2018-04-13 ENCOUNTER — OFFICE VISIT (OUTPATIENT)
Dept: ENDOCRINOLOGY | Age: 68
End: 2018-04-13

## 2018-04-13 VITALS
WEIGHT: 133.5 LBS | HEIGHT: 58 IN | SYSTOLIC BLOOD PRESSURE: 132 MMHG | HEART RATE: 71 BPM | DIASTOLIC BLOOD PRESSURE: 83 MMHG | BODY MASS INDEX: 28.02 KG/M2

## 2018-04-13 DIAGNOSIS — I10 ESSENTIAL HYPERTENSION WITH GOAL BLOOD PRESSURE LESS THAN 130/80: ICD-10-CM

## 2018-04-13 DIAGNOSIS — E78.5 HYPERLIPIDEMIA, UNSPECIFIED HYPERLIPIDEMIA TYPE: ICD-10-CM

## 2018-04-13 DIAGNOSIS — E10.9 TYPE 1 DIABETES MELLITUS WITHOUT COMPLICATION (HCC): Primary | ICD-10-CM

## 2018-04-13 NOTE — MR AVS SNAPSHOT
[address] [facility] [address] [address] [phone] Patient: Luis Carlos Weston MRN: [id number] Visit Information Date & Time Provider Department Dept. Phone Encounter #  
 4/13/2018 12:10 PM Jigar Busby, [address] [place] [address] Diabetes and Endocrinology [phone] [id number] Follow-up Instructions Return in about 3 months (around 7/13/2018). Your Appointments 5/23/2018  9:50 AM  
[facility] Appt Note: 3mth f/u; DM, HTN, chronic pain; 3mth f/u; DM, HTN, chronic pain [address] [phone]  
  
   
 [address] [facility] Upcoming Health Maintenance Date Due  
 MEDICARE YEARLY EXAM 4/29/2018 FOBT Q 1 YEAR AGE 50-75 7/6/2018 HEMOGLOBIN A1C Q6M 8/21/2018 FOOT EXAM Q1 8/22/2018 EYE EXAM RETINAL OR DILATED Q1 9/13/2018 Pneumococcal 65+ Low/Medium Risk (2 of 2 - PPSV23) 9/27/2018 MICROALBUMIN Q1 9/27/2018 LIPID PANEL Q1 2/21/2019 BREAST CANCER SCRN MAMMOGRAM 8/2/2019 GLAUCOMA SCREENING Q2Y 9/13/2019 DTaP/Tdap/Td series (2 - Td) 4/27/2027 Allergies as of 4/13/2018  Review Complete On: 4/13/2018 By: Phyllis Maldonado Severity Noted Reaction Type Reactions Compazine [Prochlorperazine Edisylate]  04/12/2010    Other (comments) Tongue swelling, drooling Naprosyn [Naproxen]  05/19/2017   Side Effect Other (comments) Belching,gas,stomach pain Percocet [Oxycodone-acetaminophen]  01/18/2018    Other (comments) Visual problems/seeings in triplicate per patient Current Immunizations  Reviewed on 11/1/2017 Name Date  
 TD Vaccine 4/13/2010  2:21 AM  
  
 Not reviewed this visit You Were Diagnosed With   
  
 Codes Comments  Type 1 diabetes mellitus without complication (HCC)    -  Primary ICD-10-CM: E10.9 ICD-9-CM: 250.01 Essential hypertension with goal blood pressure less than 130/80     ICD-10-CM: I10 
ICD-9-CM: 401.9 Hyperlipidemia, unspecified hyperlipidemia type     ICD-10-CM: E78.5 ICD-9-CM: 272.4 Vitals BP Pulse Height(growth percentile) Weight(growth percentile) BMI OB Status 132/83 (BP 1 Location: Left arm, BP Patient Position: Sitting) 71 4' 10\" (1.473 m) 133 lb 8 oz (60.6 kg) 27.9 kg/m2 Postmenopausal  
 Smoking Status Never Smoker BMI and BSA Data Body Mass Index Body Surface Area  
 27.9 kg/m 2 1.57 m 2 Preferred Pharmacy Pharmacy Name Phone [id number] [address] [address] [phone] Your Updated Medication List  
  
   
This list is accurate as of 4/13/18 12:26 PM.  Always use your most recent med list.  
  
  
  
  
 [address] Generic drug:  Blood-Glucose Meter ACCU-CHEK CAROL PLUS TEST STRP strip Generic drug:  glucose blood VI test strips Testing blood sugar 3 times per day Dx E11.9 [address] Generic drug:  Lancets Use to test blood sugar 3 times per day for Dx: E11.9  
  
 aspirin 325 mg tablet Commonly known as:  ASPIRIN Take 1 Tab by mouth daily. * B-D INSULIN SYRINGE LO-DOSE  
by Does Not Apply route. Use to inject insulin twice a day for * Insulin Syringe-Needle U-100 1/2 mL 28 gauge x 1/2\" Syrg Commonly known as:  BD LO-DOSE MICRO-FINE IV Use to inject insulin twice daily dx  
  
 diclofenac 1 % Gel Commonly known as:  VOLTAREN Apply 2 g to affected area four (4) times daily. For shoulders, elbows, and knees. ergocalciferol 50,000 unit capsule Commonly known as:  ERGOCALCIFEROL Take 1 Cap by mouth every seven (7) days. fluticasone 50 mcg/actuation nasal spray Commonly known as:  Liz Amador 2 Sprays by Both Nostrils route daily. Indications: Allergic Rhinitis  
  
 insulin NPH/insulin regular 100 unit/mL (70-30) injection Commonly known as:  NovoLIN 70/30 U-100 Insulin WorkDays Inject 24 units Breakfast, 10 units Dinner. NON workdays 24 units Breakfast,16 units Dinner Insulin Syringe-Needle, Dis Un 0.3 mL 30 gauge x 5/16\" Syrg Use to inject insulin twice a day for type 1 diabetes  
  
 losartan 50 mg tablet Commonly known as:  COZAAR  
TAKE 1 TABLET EVERY DAY  Indications: hypertension  
  
 meloxicam 15 mg tablet Commonly known as:  MOBIC Take 15 mg by mouth daily. rosuvastatin 20 mg tablet Commonly known as:  CRESTOR Take 1 Tab by mouth nightly. Indications: hyperlipidemia  
  
 triamcinolone acetonide 0.1 % topical cream  
Commonly known as:  KENALOG Apply  to affected area two (2) times a day. use thin layer to abdomen. ZyrTEC 10 mg Cap Generic drug:  Cetirizine * Notice: This list has 2 medication(s) that are the same as other medications prescribed for you. Read the directions carefully, and ask your doctor or other care provider to review them with you. Follow-up Instructions Return in about 3 months (around 7/13/2018). Patient Instructions Novolin 70/30 insulin:  
 
 Work days:         26 units breakfast, 8 units with dinner Non-work days:  26 units breakfast, 16 units with dinner If her sugar is <100 she can take half the dose, If her sugar is < 70 she should hold the dose and Please note our new policy, you must arrive to the clinic 15 minutes before your appointment time to allow enough time for proper check-in, adequate time to spend with your doctor, and also to respect the appointment time of the next patient.  Not arriving 15 minutes in advance may result in having your appointment rescheduled for the next available day/time. 
--------------------------------------------------------------------------- ------------------------------------------- Below you will find a glucose log sheet which you can use to record your blood sugars. Without checking and recording what your home glucose levels are, it will be difficult to make any changes to your medication dose, even when significant changes may be needed. Please feel free to use the log below to record your home glucose levels. At the very least, I would like for you to login the entire 2-3 weeks just before your visit so we can make your visit much more productive and beneficial to you. GLUCOSE LOG SHEET: 
 
Date Breakfast Lunch Dinner Bedtime Comments ? GLUCOSE LOG SHEET: 
 
Date Breakfast Lunch Dinner Bedtime Comments ? GLUCOSE LOG SHEET: 
 
Date Breakfast Lunch Dinner Bedtime Comments ? Please provide this summary of care documentation to your next provider. Your primary care clinician is listed as Vanessa John. If you have any questions after today's visit, please call [phone].

## 2018-04-13 NOTE — PROGRESS NOTES
CHIEF COMPLAINT: f/u uncontrolled DM1    HISTORY OF PRESENT ILLNESS:   Haynes Heimlich is a 78 y.o. female with a PMHx as noted below who presents for f/u of uncontrolled type 1 diabetes. Patient came to us with variable blood sugars which she attributed to the variability of her work days vs non-work days. Thus looking her sugar log we provided the best next step at optimizing her regimen for those particular days. Her A1c at the time was 9.3% before her evaluation.      Review of home glucose:  Novolin 70/30 insulin:    Work days:         24 units breakfast, 10 units with dinner   Non-work days:  24 units breakfast, 16 units with dinner    Home blood sugar review:       Reviewed:       -160 mostly, big improvement       Dinner 129-230, highly variable     Review of most recent diabetes-related labs:  Lab Results   Component Value Date    HBA1C 9.3 (H) 02/21/2018    HBA1C 8.6 (H) 04/28/2017    HBA1C 7.8 (H) 07/13/2016    GIB4EGEV 9.0 (A) 02/21/2018    QLL7NBRW 9.5 (A) 09/27/2017    CHOL 154 02/21/2018    LDLC 73 02/21/2018    GFRAA 89 02/21/2018    GFRNA 77 02/21/2018    MCACR 39.8 (H) 09/27/2017    TSH 0.925 02/21/2018    VITD3 39.9 02/21/2018     Lab Key:  [id number] = IA-2 pancreatic islet cell autoantibody  GADLT = FELI-65 autoantibody   MCACR = Urine Microalbumin  INSUL = Insulin level  CPEPL = C-peptide level      PAST MEDICAL/SURGICAL HISTORY:   Past Medical History:   Diagnosis Date    Arthritis     Diabetes ([facility] [address]     Hypercholesterolemia     Hypertension      Past Surgical History:   Procedure Laterality Date    EYE SURG ANT SGMT PROC UNLISTED      HX TUBAL LIGATION         ALLERGIES:   Allergies   Allergen Reactions    Compazine [Prochlorperazine Edisylate] Other (comments)     Tongue swelling, drooling    Naprosyn [Naproxen] Other (comments)     Belching,gas,stomach pain    Percocet [Oxycodone-Acetaminophen] Other (comments)     Visual problems/seeings in triplicate per patient MEDICATIONS ON ADMISSION:     Current Outpatient Prescriptions:     ergocalciferol (ERGOCALCIFEROL) 50,000 unit capsule, Take 1 Cap by mouth every seven (7) days. , Disp: 12 Cap, Rfl: 0    insulin NPH/insulin regular (NOVOLIN 70/30 U-100 INSULIN) 100 unit/mL (70-30) injection, WorkDays Inject 24 units Breakfast, 10 units Dinner. NON workdays 24 units Breakfast,16 units Dinner, Disp: 5 Vial, Rfl: 3    ACCU-CHEK SOFTCLIX LANCETS misc, Use to test blood sugar 3 times per day for Dx: E11.9, Disp: 400 Each, Rfl: 3    ACCU-CHEK CAROL PLUS TEST STRP strip, Testing blood sugar 3 times per day Dx E11.9, Disp: 400 Strip, Rfl: 3    meloxicam (MOBIC) 15 mg tablet, Take 15 mg by mouth daily. , Disp: , Rfl:     fluticasone (FLONASE) 50 mcg/actuation nasal spray, 2 Sprays by Both Nostrils route daily. Indications: Allergic Rhinitis, Disp: 1 Bottle, Rfl: 5    ZYRTEC 10 mg cap, , Disp: , Rfl:     losartan (COZAAR) 50 mg tablet, TAKE 1 TABLET EVERY DAY  Indications: hypertension, Disp: 90 Tab, Rfl: 3    Insulin Syringe-Needle, Dis Un 0.3 mL 30 gauge x 5/16\" syrg, Use to inject insulin twice a day for type 1 diabetes, Disp: 200 Syringe, Rfl: 3    Insulin Syringe-Needle U-100 (BD LO-DOSE MICRO-FINE IV) 1/2 mL 28 gauge x 1/2\" syrg, Use to inject insulin twice daily dx, Disp: 200 Syringe, Rfl: 3    ACCU-CHEK CAROL PLUS METER misc, , Disp: , Rfl:     SYRINGE-NEEDLE,INSULIN,0.5 ML (B-D INSULIN SYRINGE LO-DOSE), by Does Not Apply route. Use to inject insulin twice a day for, Disp: , Rfl:     aspirin (ASPIRIN) 325 mg tablet, Take 1 Tab by mouth daily. , Disp: 30 Tab, Rfl: 5    rosuvastatin (CRESTOR) 20 mg tablet, Take 1 Tab by mouth nightly. Indications: hyperlipidemia, Disp: 90 Tab, Rfl: 3    triamcinolone acetonide (KENALOG) 0.1 % topical cream, Apply  to affected area two (2) times a day. use thin layer to abdomen., Disp: 15 g, Rfl: 0    diclofenac (VOLTAREN) 1 % gel, Apply 2 g to affected area four (4) times daily.  For shoulders, elbows, and knees. , Disp: 100 g, Rfl: 5    SOCIAL HISTORY:   Social History     Social History    Marital status:      Spouse name: N/A    Number of children: N/A    Years of education: N/A     Occupational History    Not on file. Social History Main Topics    Smoking status: Never Smoker    Smokeless tobacco: Never Used    Alcohol use No    Drug use: No    Sexual activity: Not on file     Other Topics Concern    Not on file     Social History Narrative       FAMILY HISTORY:  Family History   Problem Relation Age of Onset    Cancer Mother     Diabetes Sister     Heart Disease Sister     Diabetes Brother     Heart Disease Brother     Diabetes Maternal Grandmother     Diabetes Paternal Grandmother        REVIEW OF SYSTEMS: Complete ROS assessed and noted for that which is described above, all else are negative. Eyes: normal  ENT: normal  CVS: normal  Resp: normal  GI: normal  : normal  GYN: normal  Endocrine: normal  Integument: normal  Musculoskeletal: normal  Neuro: normal  Psych: normal      PHYSICAL EXAMINATION:    VITAL SIGNS:  Visit Vitals    /83 (BP 1 Location: Left arm, BP Patient Position: Sitting)    Pulse 71    Ht 4' 10\" (1.473 m)    Wt 133 lb 8 oz (60.6 kg)    BMI 27.9 kg/m2       GENERAL: NCAT, Sitting comfortably, NAD  EYES: EOMI, non-icteric, no proptosis  Ear/Nose/Throat: NCAT, no inflammation, no masses  LYMPH NODES: No LAD  CARDIOVASCULAR: S1 S2, RRR, No murmur, 2+ radial pulses  RESPIRATORY: CTA b/l, no wheeze/rales  GASTROINTESTINAL: NT, ND  MUSCULOSKELETAL: Normal ROM, no atrophy  SKIN: warm, no edema/rash/ or other skin changes  NEUROLOGIC: 5/5 power all extremities, no tremor, AAOx3  PSYCHIATRIC: Normal affect, Normal insight and judgement    REVIEW OF LABORATORY AND RADIOLOGY DATA:   Labs and documentation have been reviewed as described above.      ASSESSMENT AND PLAN:   Guillermo Sands is a 78 y.o. female with a PMHx as noted above who presents for f/u of uncontrolled type 1 diabetes. Problems:  Type 1 diabetes Uncontrolled  Hyperlipidemia  Hypertension    As noted above, previously we had modified patients regimen to accommodate her work week, which was causing quite a bit of variability. She had some clear changes on work days compared with non-work days. The current regimen has improved this quite a bit, we will however need to take the next steps in optimizing her regimen addressing her need for slightly more insulin with breakfast on all days, and slightly less insulin at dinner on work days. Gradually she is showing good improvement in home numbers. PLAN  Type 1 Diabetes  Medications:  Novolin 70/30 insulin:    Work days:         26 units breakfast, 8 units with dinner   Non-work days:  26 units breakfast, 16 units with dinner    If her sugar is <100 she can take half the dose,    If her sugar is < 70 she should hold the dose and   Advised to check glucose  qAM/Dinner/Bedtime    HTN: reviewed blood pressure, stable on losartan  HLD: reviewed fasting lipids, stable on crestor    RTC: I would like to see them back in 3 months. Jos Murphy.  [address] [facility]

## 2018-04-13 NOTE — PATIENT INSTRUCTIONS
Novolin 70/30 insulin:      Work days:         26 units breakfast, 8 units with dinner   Non-work days:  26 units breakfast, 16 units with dinner      If her sugar is <100 she can take half the dose,    If her sugar is < 70 she should hold the dose and       Please note our new policy, you must arrive to the clinic 15 minutes before your appointment time to allow enough time for proper check-in, adequate time to spend with your doctor, and also to respect the appointment time of the next patient. Not arriving 15 minutes in advance may result in having your appointment rescheduled for the next available day/time.  ----------------------------------------------------------------------------------------------------------------------    Below you will find a glucose log sheet which you can use to record your blood sugars. Without checking and recording what your home glucose levels are, it will be difficult to make any changes to your medication dose, even when significant changes may be needed. Please feel free to use the log below to record your home glucose levels. At the very least, I would like for you to login the entire 2-3 weeks just before your visit so we can make your visit much more productive and beneficial to you. GLUCOSE LOG SHEET:    Date Breakfast Lunch Dinner Bedtime Comments ? GLUCOSE LOG SHEET:    Date Breakfast Lunch Dinner Bedtime Comments ? GLUCOSE LOG SHEET:    Date Breakfast Lunch Dinner Bedtime Comments ?

## 2018-04-23 DIAGNOSIS — E78.2 MIXED HYPERLIPIDEMIA: ICD-10-CM

## 2018-04-23 RX ORDER — ROSUVASTATIN CALCIUM 20 MG/1
20 TABLET, COATED ORAL
Qty: 90 TAB | Refills: 3 | Status: SHIPPED | OUTPATIENT
Start: 2018-04-23 | End: 2019-04-21 | Stop reason: SDUPTHER

## 2018-04-23 RX ORDER — MELOXICAM 15 MG/1
15 TABLET ORAL DAILY
Qty: 90 TAB | Refills: 1 | Status: SHIPPED | OUTPATIENT
Start: 2018-04-23 | End: 2018-09-19 | Stop reason: ALTCHOICE

## 2018-04-23 NOTE — TELEPHONE ENCOUNTER
REFILL     PCP: Sung Duffy MD     Last appt: Visit date not found   Future Appointments  Date Time Provider Adry Vital   5/23/2018 9:50 AM Sung Duffy  [place] [place]   7/18/2018 9:50 AM Jerrell Davis MD [facility] [place] [address] [facility]        Requested Prescriptions     Pending Prescriptions Disp Refills    meloxicam (MOBIC) 15 mg tablet       Sig: Take 1 Tab by mouth daily.  rosuvastatin (CRESTOR) 20 mg tablet 90 Tab 3     Sig: Take 1 Tab by mouth nightly.  Indications: hyperlipidemia

## 2018-05-23 ENCOUNTER — OFFICE VISIT (OUTPATIENT)
Dept: INTERNAL MEDICINE CLINIC | Facility: CLINIC | Age: 68
End: 2018-05-23

## 2018-05-23 VITALS
SYSTOLIC BLOOD PRESSURE: 134 MMHG | HEART RATE: 70 BPM | BODY MASS INDEX: 28.34 KG/M2 | DIASTOLIC BLOOD PRESSURE: 77 MMHG | HEIGHT: 58 IN | OXYGEN SATURATION: 98 % | WEIGHT: 135 LBS | RESPIRATION RATE: 16 BRPM | TEMPERATURE: 98.4 F

## 2018-05-23 DIAGNOSIS — J30.2 SEASONAL ALLERGIC RHINITIS, UNSPECIFIED TRIGGER: ICD-10-CM

## 2018-05-23 DIAGNOSIS — M15.9 PRIMARY OSTEOARTHRITIS INVOLVING MULTIPLE JOINTS: ICD-10-CM

## 2018-05-23 DIAGNOSIS — E10.9 TYPE 1 DIABETES MELLITUS WITHOUT COMPLICATION (HCC): ICD-10-CM

## 2018-05-23 DIAGNOSIS — Z13.39 SCREENING FOR ALCOHOLISM: ICD-10-CM

## 2018-05-23 DIAGNOSIS — Z00.00 MEDICARE ANNUAL WELLNESS VISIT, SUBSEQUENT: Primary | ICD-10-CM

## 2018-05-23 DIAGNOSIS — E66.3 OVERWEIGHT (BMI 25.0-29.9): ICD-10-CM

## 2018-05-23 DIAGNOSIS — Z71.89 ADVANCE CARE PLANNING: ICD-10-CM

## 2018-05-23 DIAGNOSIS — G89.4 CHRONIC PAIN SYNDROME: ICD-10-CM

## 2018-05-23 DIAGNOSIS — E78.2 MIXED HYPERLIPIDEMIA: ICD-10-CM

## 2018-05-23 DIAGNOSIS — F33.0 MILD EPISODE OF RECURRENT MAJOR DEPRESSIVE DISORDER (HCC): ICD-10-CM

## 2018-05-23 DIAGNOSIS — I10 ESSENTIAL HYPERTENSION: ICD-10-CM

## 2018-05-23 LAB
GLUCOSE POC: 117 MG/DL
HBA1C MFR BLD HPLC: 8.4 %

## 2018-05-23 RX ORDER — DULOXETIN HYDROCHLORIDE 30 MG/1
60 CAPSULE, DELAYED RELEASE ORAL DAILY
Qty: 60 CAP | Refills: 2 | Status: SHIPPED | OUTPATIENT
Start: 2018-05-23 | End: 2018-10-29 | Stop reason: SDUPTHER

## 2018-05-23 NOTE — ACP (ADVANCE CARE PLANNING)

## 2018-05-23 NOTE — MR AVS SNAPSHOT
[address] [address] [phone] Patient: Isael Weston MRN: [id number] OAE:[birth year] Visit Information Date & Time Provider Department Dept. Phone Encounter #  
 5/23/2018  9:50 AM Garth Herrera MD [facility] [phone] [id number] Follow-up Instructions Return in about 3 months (around 8/23/2018), or if symptoms worsen or fail to improve, for Chronic pain, DM, HTN. Your Appointments 7/18/2018  9:50 AM  
Follow Up with Michelle Pringle MD  
[place] [facility] [address] [place] [address]) Appt Note: 3 month f/u  
 [address] [facility] [address] [id number] [address] [facility] Upcoming Health Maintenance Date Due FOBT Q 1 YEAR AGE 50-75 7/6/2018 Influenza Age 5 to Adult 8/1/2018 HEMOGLOBIN A1C Q6M 8/21/2018 FOOT EXAM Q1 8/22/2018 EYE EXAM RETINAL OR DILATED Q1 9/13/2018 Pneumococcal 65+ Low/Medium Risk (2 of 2 - PPSV23) 9/27/2018 MICROALBUMIN Q1 9/27/2018 LIPID PANEL Q1 2/21/2019 MEDICARE YEARLY EXAM 5/24/2019 BREAST CANCER SCRN MAMMOGRAM 8/2/2019 GLAUCOMA SCREENING Q2Y 9/13/2019 DTaP/Tdap/Td series (2 - Td) 4/27/2027 Allergies as of 5/23/2018  Review Complete On: 5/23/2018 By: Garth Herrera MD  
  
 Severity Noted Reaction Type Reactions Compazine [Prochlorperazine Edisylate]  04/12/2010    Other (comments) Tongue swelling, drooling Naprosyn [Naproxen]  05/19/2017   Side Effect Other (comments) Belching,gas,stomach pain Percocet [Oxycodone-acetaminophen]  01/18/2018    Other (comments) Visual problems/seeings in triplicate per patient Current Immunizations  Reviewed on 11/1/2017 Name Date  
 TD Vaccine 4/13/2010  2:21 AM  
  
 Not reviewed this visit You Were Diagnosed With   
  
 Codes Comments Medicare annual wellness visit, subsequent    -  Primary ICD-10-CM: Z00.00 ICD-9-CM: V70.0 Type 1 diabetes mellitus without complication (HCC)     KWF-27-HP: E10.9 ICD-9-CM: 250.01 Chronic pain syndrome     ICD-10-CM: G89.4 ICD-9-CM: 338.4 Essential hypertension     ICD-10-CM: I10 
ICD-9-CM: 401.9 Mixed hyperlipidemia     ICD-10-CM: E78.2 ICD-9-CM: 272.2 Primary osteoarthritis involving multiple joints     ICD-10-CM: M15.0 ICD-9-CM: 715.09 Overweight (BMI 25.0-29. 9)     ICD-10-CM: S45.8 ICD-9-CM: 278.02   
 Mild episode of recurrent major depressive disorder ([facility] [address]     ICD-10-CM: F33.0 ICD-9-CM: 296.31 Seasonal allergic rhinitis, unspecified trigger     ICD-10-CM: J30.2 ICD-9-CM: 477.9 Screening for alcoholism     ICD-10-CM: Z13.89 ICD-9-CM: V79.1 Advance care planning     ICD-10-CM: Z71.89 ICD-9-CM: V65.49 Vitals BP Pulse Temp Resp Height(growth percentile) Weight(growth percentile) 134/77 (BP 1 Location: Left arm, BP Patient Position: Sitting) 70 98.4 °F (36.9 °C) (Oral) 16 4' 10\" (1.473 m) 135 lb (61.2 kg) SpO2 BMI OB Status Smoking Status 98% 28.22 kg/m2 Postmenopausal Never Smoker BMI and BSA Data Body Mass Index Body Surface Area  
 28.22 kg/m 2 1.58 m 2 Preferred Pharmacy Pharmacy Name Phone [id number] [address] [place] [address] [phone] Your Updated Medication List  
  
   
This list is accurate as of 5/23/18 10:56 AM.  Always use your most recent med list.  
  
  
  
  
 [address] Generic drug:  Blood-Glucose Meter ACCU-CHEK CAROL PLUS TEST STRP strip Generic drug:  glucose blood VI test strips Testing blood sugar 3 times per day Dx E11.9 [facility] Generic drug:  Lancets Use to test blood sugar 3 times per day for Dx: E11.9  
  
 aspirin 325 mg tablet Commonly known as:  ASPIRIN  
 Take 1 Tab by mouth daily. * B-D INSULIN SYRINGE LO-DOSE  
by Does Not Apply route. Use to inject insulin twice a day for * Insulin Syringe-Needle U-100 1/2 mL 28 gauge x 1/2\" Syrg Commonly known as:  BD LO-DOSE MICRO-FINE IV Use to inject insulin twice daily dx DULoxetine 30 mg capsule Commonly known as:  CYMBALTA Take 2 Caps by mouth daily. For pain and depression. ergocalciferol 50,000 unit capsule Commonly known as:  ERGOCALCIFEROL Take 1 Cap by mouth every seven (7) days. Insulin Syringe-Needle, Dis Un 0.3 mL 30 gauge x 5/16\" Syrg Use to inject insulin twice a day for type 1 diabetes  
  
 losartan 50 mg tablet Commonly known as:  COZAAR  
TAKE 1 TABLET EVERY DAY  Indications: hypertension  
  
 meloxicam 15 mg tablet Commonly known as:  MOBIC Take 1 Tab by mouth daily. Take with food. Indications: OSTEOARTHRITIS NovoLIN 70/30 U-100 Insulin 100 unit/mL (70-30) injection Generic drug:  insulin NPH/insulin regular  
by SubCUTAneous route. Inject 26 units am  16 units (non work day)pm or 8 units (work day)pm Take 1/2 doses if BS less than or greater than 100 No doses if BS less than or greater than 70 (drink orange juice)  
  
 rosuvastatin 20 mg tablet Commonly known as:  CRESTOR Take 1 Tab by mouth nightly. Indications: hyperlipidemia ZyrTEC 10 mg Cap Generic drug:  Cetirizine * Notice: This list has 2 medication(s) that are the same as other medications prescribed for you. Read the directions carefully, and ask your doctor or other care provider to review them with you. Prescriptions Sent to Pharmacy Refills DULoxetine (CYMBALTA) 30 mg capsule 2 Sig: Take 2 Caps by mouth daily. For pain and depression. Class: Normal  
 Pharmacy: [id number] [address] Ph #: [phone] Route: Oral  
  
We Performed the Following AMB POC GLUCOSE BLOOD, BY GLUCOSE MONITORING DEVICE [13965 CPT(R)] Follow-up Instructions Return in about 3 months (around 8/23/2018), or if symptoms worsen or fail to improve, for Chronic pain, DM, HTN. Patient Instructions Patient Instructions 1. Start Cymbalta 30 mg. Take 1 tab once a day for the first 7 days. If it is not helping pain enough, then increase to 2 tabs once daily. Cymbalta is for pain relief and for depression management. 2. Stop meloxicam (Mobic) since it is not helping your pain. 3. Continue taking Arthritis Pain Relief tablets as needed (no more than 4 tabs a day) 4. Stop Zyrtec (cetirizine) if it is not helping. Try Allegra (fexofenadine) or Claritin (loratadine) with saline nasal spray for seasonal allergies (runny nose, sneezing). Schedule of Personalized Health Plan The best way to stay healthy is to live a healthy lifestyle. A healthy lifestyle includes regular exercise, eating a well-balanced diet, keeping a healthy weight and not smoking. Regular physical exams and screening tests are another important way to take care of yourself. Preventive exams provided by health care providers can find health problems early when treatment works best and can keep you from getting certain diseases or illnesses. Preventive services include exams, lab tests, screenings, shots, monitoring and information to help you take care of your own health. All people over 65 should have a pneumonia shot. Pneumonia shots are usually only needed once in a lifetime unless your doctor decides differently. All people over 65 should have a yearly flu shot. People over 65 are at medium to high risk for Hepatitis B. Three shots are needed for complete protection. In addition to your physical exam, some screening tests are recommended: 
 
Bone mass measurement (dexa scan) is recommended every two years Diabetes Mellitus screening is recommended every year. Glaucoma is an eye disease caused by high pressure in the eye. An eye exam is recommended every year. Cardiovascular screening tests that check your cholesterol and other blood fat (lipid) levels are recommended every five years. Colorectal Cancer screening tests help to find pre-cancerous polyps (growths in the colon) so they can be removed before they turn into cancer. Tests ordered for screening depend on your personal and family history risk factors. Screening for Breast Cancer is recommended yearly with a mammogram. 
 
Screening for Cervical Cancer is recommended every two years (annually for certain risk factors, such as previous history of STD or abnormal PAP in past 7 years), with a Pelvic Exam with PAP Here is a list of your current Health Maintenance items with a due date: 
Health Maintenance Topic Date Due  
 FOBT Q 1 YEAR AGE 50-75  07/06/2018  Influenza Age 5 to Adult  08/01/2018  HEMOGLOBIN A1C Q6M  08/21/2018  
 FOOT EXAM Q1  08/22/2018  
 EYE EXAM RETINAL OR DILATED Q1  09/13/2018  Pneumococcal 65+ Low/Medium Risk (2 of 2 - PPSV23) 09/27/2018  MICROALBUMIN Q1  09/27/2018  LIPID PANEL Q1  02/21/2019  MEDICARE YEARLY EXAM  05/24/2019  BREAST CANCER SCRN MAMMOGRAM  08/02/2019  GLAUCOMA SCREENING Q2Y  09/13/2019  
 DTaP/Tdap/Td series (2 - Td) 04/27/2027  Hepatitis C Screening  Completed  Bone Densitometry (Dexa) Screening  Completed  ZOSTER VACCINE AGE 60>  Addressed Please provide this summary of care documentation to your next provider. Your primary care clinician is listed as Rinku Draft. If you have any questions after today's visit, please call [phone].

## 2018-05-23 NOTE — PATIENT INSTRUCTIONS
Patient Instructions  1. Start Cymbalta 30 mg. Take 1 tab once a day for the first 7 days. If it is not helping pain enough, then increase to 2 tabs once daily. Cymbalta is for pain relief and for depression management. 2. Stop meloxicam (Mobic) since it is not helping your pain. 3. Continue taking Arthritis Pain Relief tablets as needed (no more than 4 tabs a day)  4. Stop Zyrtec (cetirizine) if it is not helping. Try Allegra (fexofenadine) or Claritin (loratadine) with saline nasal spray for seasonal allergies (runny nose, sneezing). Schedule of Personalized Health Plan    The best way to stay healthy is to live a healthy lifestyle. A healthy lifestyle includes regular exercise, eating a well-balanced diet, keeping a healthy weight and not smoking. Regular physical exams and screening tests are another important way to take care of yourself. Preventive exams provided by health care providers can find health problems early when treatment works best and can keep you from getting certain diseases or illnesses. Preventive services include exams, lab tests, screenings, shots, monitoring and information to help you take care of your own health. All people over 65 should have a pneumonia shot. Pneumonia shots are usually only needed once in a lifetime unless your doctor decides differently. All people over 65 should have a yearly flu shot. People over 65 are at medium to high risk for Hepatitis B. Three shots are needed for complete protection. In addition to your physical exam, some screening tests are recommended:    Bone mass measurement (dexa scan) is recommended every two years  Diabetes Mellitus screening is recommended every year. Glaucoma is an eye disease caused by high pressure in the eye. An eye exam is recommended every year. Cardiovascular screening tests that check your cholesterol and other blood fat (lipid) levels are recommended every five years.      Colorectal Cancer screening tests help to find pre-cancerous polyps (growths in the colon) so they can be removed before they turn into cancer. Tests ordered for screening depend on your personal and family history risk factors.     Screening for Breast Cancer is recommended yearly with a mammogram.    Screening for Cervical Cancer is recommended every two years (annually for certain risk factors, such as previous history of STD or abnormal PAP in past 7 years), with a Pelvic Exam with PAP    Here is a list of your current Health Maintenance items with a due date:  Health Maintenance   Topic Date Due    FOBT Q 1 YEAR AGE 50-75  07/06/2018    Influenza Age 5 to Adult  08/01/2018    HEMOGLOBIN A1C Q6M  08/21/2018    FOOT EXAM Q1  08/22/2018    EYE EXAM RETINAL OR DILATED Q1  09/13/2018    Pneumococcal 65+ Low/Medium Risk (2 of 2 - PPSV23) 09/27/2018    MICROALBUMIN Q1  09/27/2018    LIPID PANEL Q1  02/21/2019    MEDICARE YEARLY EXAM  05/24/2019    BREAST CANCER SCRN MAMMOGRAM  08/02/2019    GLAUCOMA SCREENING Q2Y  09/13/2019    DTaP/Tdap/Td series (2 - Td) 04/27/2027    Hepatitis C Screening  Completed    Bone Densitometry (Dexa) Screening  Completed    ZOSTER VACCINE AGE 60>  Addressed

## 2018-05-23 NOTE — PROGRESS NOTES
This is a Subsequent Medicare Annual Wellness Visit providing Personalized Prevention Plan Services (PPPS) (Performed 12 months after initial AWV and PPPS )    I have reviewed the patient's medical history in detail and updated the computerized patient record. History   Arvind Woodruff is a 78 y.o. female. [facility] and 3 month follow up evaluation. She has type 1 DM, HTN, hyperlipidemia, and seasonal allergic rhinitis. She complains of pain all over (low back, shoulders, arms, legs, knees, fingers, toes) not relieved with meloxicam 15 mg daily. Started taking Arthritis Pain Relief (acetaminophen 650 mg) a couple of weeks ago also. Endocrine Review  She is seen for diabetes. Denies polydipsia, polyuria, or hypoglycemia. Has started following with Dr. Milad Alonso. She is taking her medications as instructed. Home glucose monitoring: is performed regularly. Brought in home BS record; checking BS TID. She reports medication compliance: compliant all of the time. Medication side effects: none. Diabetic diet compliance: compliant most of the time. Lab review: A1c today is 8.4% (was 9.3% on 2/21/18). Eye exam: UTD. Cardiovascular Review  The patient has hypertension and hyperlipidemia.  She reports taking medications as instructed, no medication side effects noted.  Diet and Lifestyle: generally follows a low fat low cholesterol diet, generally follows a low sodium diet, no formal exercise but active during the day.  Lab review: orders written for new lab studies as appropriate; see orders.       Soc Hx  . Has 1 son; no grandchildren. Lives with her son in a trailer home. Works at [employer]. Usually works on Wednesdays, Saturdays, and Sundays. Does not drive; walks to work. Never smoker. Denies alcohol or recreational drug use.  Drinks a cup of coffee twice daily.     ROS  A complete review of systems was performed and is negative except for those mentioned in the HPI.         PLAN  Type 1 Diabetes  Medications:  Novolin 70/30 insulin:                     Work days:         26 units breakfast, 8 units with dinner                    Non-work days:  26 units breakfast, 16 units with dinner                                              If her sugar is <100 she can take half the dose,                                              If her sugar is < 70 she should hold the dose and   Advised to check glucose  qAM/Dinner/Bedtime             Past Medical History:   Diagnosis Date    Arthritis     Diabetes ([address]     Hypercholesterolemia     Hypertension       Past Surgical History:   Procedure Laterality Date    EYE SURG ANT SGMT PROC UNLISTED      HX TUBAL LIGATION       Current Outpatient Prescriptions   Medication Sig Dispense Refill    insulin NPH/insulin regular (NOVOLIN 70/30 U-100 INSULIN) 100 unit/mL (70-30) injection by SubCUTAneous route. Inject 26 units am   16 units (non work day)pm or  8 units (work day)pm  Take 1/2 doses if BS less than or greater than 100  No doses if BS less than or greater than 70  (drink orange juice)      meloxicam (MOBIC) 15 mg tablet Take 1 Tab by mouth daily. Take with food. Indications: OSTEOARTHRITIS 90 Tab 1    rosuvastatin (CRESTOR) 20 mg tablet Take 1 Tab by mouth nightly. Indications: hyperlipidemia 90 Tab 3    ergocalciferol (ERGOCALCIFEROL) 50,000 unit capsule Take 1 Cap by mouth every seven (7) days.  12 Cap 0    ACCU-CHEK SOFTCLIX LANCETS misc Use to test blood sugar 3 times per day for Dx: E11.9 400 Each 3    ACCU-CHEK CAROL PLUS TEST STRP strip Testing blood sugar 3 times per day Dx E11.9 400 Strip 3    ZYRTEC 10 mg cap       losartan (COZAAR) 50 mg tablet TAKE 1 TABLET EVERY DAY  Indications: hypertension 90 Tab 3    Insulin Syringe-Needle, Dis Un 0.3 mL 30 gauge x 5/16\" syrg Use to inject insulin twice a day for type 1 diabetes 200 Syringe 3    Insulin Syringe-Needle U-100 (BD LO-DOSE MICRO-FINE IV) 1/2 mL 28 gauge x 1/2\" syrg Use to inject insulin twice daily dx 200 Syringe 3    ACCU-CHEK CAROL PLUS METER misc       SYRINGE-NEEDLE,INSULIN,0.5 ML (B-D INSULIN SYRINGE LO-DOSE) by Does Not Apply route. Use to inject insulin twice a day for      aspirin (ASPIRIN) 325 mg tablet Take 1 Tab by mouth daily. 30 Tab 5     Allergies   Allergen Reactions    Compazine [Prochlorperazine Edisylate] Other (comments)     Tongue swelling, drooling    Naprosyn [Naproxen] Other (comments)     Belching,gas,stomach pain    Percocet [Oxycodone-Acetaminophen] Other (comments)     Visual problems/seeings in triplicate per patient     Family History   Problem Relation Age of Onset    Cancer Mother     Diabetes Sister     Heart Disease Sister     Diabetes Brother     Heart Disease Brother     Diabetes Maternal Grandmother     Diabetes Paternal Grandmother      Social History   Substance Use Topics    Smoking status: Never Smoker    Smokeless tobacco: Never Used    Alcohol use No     Patient Active Problem List   Diagnosis Code    Type 1 diabetes mellitus without complication (HCC) G29.6    Essential hypertension I10    Mixed hyperlipidemia E78.2    Allergic rhinitis J30.9    Vitamin D deficiency E55.9    Chronic left shoulder pain M25.512, G89.29    Primary osteoarthritis involving multiple joints M15.0    Obesity (BMI 30-39. 9) E66.9    Osteopenia M85.80    Depressed mood F32.9    Chronic pain syndrome G89.4       Depression Risk Factor Screening:     PHQ over the last two weeks 4/28/2017   Little interest or pleasure in doing things Not at all   Feeling down, depressed or hopeless Several days   Total Score PHQ 2 1     Alcohol Risk Factor Screening: You do not drink alcohol or very rarely. Functional Ability and Level of Safety:     Hearing Loss   Hearing is good. Activities of Daily Living   Self-care.    Requires assistance with: no ADLs    Fall Risk     Fall Risk Assessment, last 12 mths 5/23/2018 Able to walk? Yes   Fall in past 12 months? No   Fall with injury? -   Number of falls in past 12 months -   Fall Risk Score -     Abuse Screen   Patient is not abused    Review of Systems   Pertinent items are noted in HPI. Physical Examination     Evaluation of Cognitive Function:  Mood/affect:  neutral  Appearance: age appropriate  Family member/caregiver input: none    Visit Vitals    /77 (BP 1 Location: Left arm, BP Patient Position: Sitting)    Pulse 70    Temp 98.4 °F (36.9 °C) (Oral)    Resp 16    Ht 4' 10\" (1.473 m)    Wt 135 lb (61.2 kg)    SpO2 98%    BMI 28.22 kg/m2   Weight unchanged since last clinic visit 3 months ago    General: Well-developed and well-nourished, no distress. HEENT:  Head normocephalic/atraumatic, no scleral icterus  Neck: Supple. No carotid bruits, JVD, lymphadenopathy, or thyromegaly. Lungs:  Clear to ausculation bilaterally. Good air movement. Heart:  Regular rate and rhythm, normal S1 and S2, no murmur, gallop, or rub  Extremities: No clubbing, cyanosis, or edema. Musculoskeletal: Positive for 5 of 18 characteristic tender points for fibromyalgia. Neurological: Alert and oriented. Psychiatric: Normal mood and affect. Behavior is normal.     Patient Care Team:  Tammie Kee MD as PCP - General (Internal Medicine)   Dr. Consuelo Araujo (Endocrinology)      Results for orders placed or performed in visit on 05/23/18   AMB POC GLUCOSE BLOOD, BY GLUCOSE MONITORING DEVICE   Result Value Ref Range    Glucose  mg/dL   AMB POC HEMOGLOBIN A1C   Result Value Ref Range    Hemoglobin A1c (POC) 8.4 %       Advice/Referrals/Counseling   Education and counseling provided:  Are appropriate based on today's review and evaluation  End-of-Life planning (with patient's consent)    Assessment/Plan       ICD-10-CM ICD-9-CM    1. Medicare annual wellness visit, subsequent Z00.00 V70.0    2.  Type 1 diabetes mellitus without complication (HCC) G27.2 250.01 AMB POC GLUCOSE BLOOD, BY GLUCOSE MONITORING DEVICE      AMB POC HEMOGLOBIN A1C   3. Chronic pain syndrome G89.4 338.4 DULoxetine (CYMBALTA) 30 mg capsule   4. Essential hypertension I10 401.9    5. Mixed hyperlipidemia E78.2 272.2    6. Primary osteoarthritis involving multiple joints M15.0 715.09    7. Overweight (BMI 25.0-29. 9) E66.3 278.02    8. Mild episode of recurrent major depressive disorder (HCC) F33.0 296.31    9. Seasonal allergic rhinitis, unspecified trigger J30.2 477.9    10. Screening for alcoholism Z13.89 V79.1    11. Advance care planning Z71.89 V65.49        Diagnoses and all orders for this visit:    1. Medicare annual wellness visit, subsequent    2. Type 1 diabetes mellitus without complication (HCC)  Z1N still high at 8.4%, but improving.  -     AMB POC GLUCOSE BLOOD, BY GLUCOSE MONITORING DEVICE  -     AMB POC HEMOGLOBIN A1C    3. Chronic pain syndrome  Due to OA but appears to also have chronic musculoskeletal pain. -     Start DULoxetine (CYMBALTA) 30 mg capsule; Take 2 Caps by mouth daily. For pain and depression. Patient Instructions  1. Start Cymbalta 30 mg. Take 1 tab once a day for the first 7 days. If it is not helping pain enough, then increase to 2 tabs once daily. Cymbalta is for pain relief and for depression management. 2. Stop meloxicam (Mobic) since it is not helping your pain. 3. Continue taking Arthritis Pain Relief tablets as needed (no more than 4 tabs a day)  4. Stop Zyrtec (cetirizine) if it is not helping. Try Allegra (fexofenadine) or Claritin (loratadine) with saline nasal spray for seasonal allergies (runny nose, sneezing). 4. Essential hypertension  Controlled, continue present management. 5. Mixed hyperlipidemia  Controlled, continue present management. 6. Primary osteoarthritis involving multiple joints  Continue Arthritis Pain Relief (acetaminophen 650 mg). 7. Overweight (BMI 25.0-29. 9)  Discussed the patient's BMI with her.  The BMI follow up plan is as follows: dietary management education, guidance, and counseling; encourage exercise; monitor weight; prescribed dietary intake. Follow up BMI in 3 months. 8. Mild episode of recurrent major depressive disorder ([facility] [address]  Start Cymbalta. 9. Seasonal allergic rhinitis, unspecified trigger  See Patient Instructions above. 10. Screening for alcoholism    11. Advance care planning      Follow-up Disposition:  Return in about 3 months (around 8/23/2018), or if symptoms worsen or fail to improve, for Chronic pain, DM, HTN. Patient seen and had Medicare Annual Wellness Exam; Wellness Schedule printed, reviewed, and given to patient.

## 2018-05-23 NOTE — PROGRESS NOTES
Tristen Weston  Identified pt with two pt identifiers(name and ). Chief Complaint   Patient presents with    Hypertension    Diabetes    Pain (Chronic)    Other     leg/arm shaking       1. Have you been to the ER, urgent care clinic since your last visit? Hospitalized since your last visit? NO    2. Have you seen or consulted any other health care providers outside of the [facility] since your last visit? Include any pap smears or colon screening. NO    Today's provider has been notified of reason for visit, vitals and flowsheets obtained on patients.      Patient received paperwork for advance directive during previous visit but has not completed at this time     Reviewed record In preparation for visit, huddled with provider and have obtained necessary documentation      Health Maintenance Due   Topic    MEDICARE YEARLY EXAM     FOBT Q 1 YEAR AGE 50-75        Wt Readings from Last 3 Encounters:   18 135 lb (61.2 kg)   18 133 lb 8 oz (60.6 kg)   18 135 lb 4.8 oz (61.4 kg)     Temp Readings from Last 3 Encounters:   18 98.4 °F (36.9 °C) (Oral)   18 98.3 °F (36.8 °C) (Oral)   17 97.6 °F (36.4 °C) (Oral)     BP Readings from Last 3 Encounters:   18 134/77   18 132/83   18 130/82     Pulse Readings from Last 3 Encounters:   18 70   18 71   18 66     Vitals:    18 0959   BP: 134/77   Pulse: 70   Resp: 16   Temp: 98.4 °F (36.9 °C)   TempSrc: Oral   SpO2: 98%   Weight: 135 lb (61.2 kg)   Height: 4' 10\" (1.473 m)   PainSc:   0 - No pain         Learning Assessment:  :     Learning Assessment 2017   PRIMARY LEARNER Patient   PRIMARY LANGUAGE ENGLISH   LEARNER PREFERENCE PRIMARY READING   ANSWERED BY patient   RELATIONSHIP SELF       Depression Screening:  :     PHQ over the last two weeks 2017   Little interest or pleasure in doing things Not at all   Feeling down, depressed or hopeless Several days   Total Score PHQ 2 1       Fall Risk Assessment:  :     Fall Risk Assessment, last 12 mths 5/23/2018   Able to walk? Yes   Fall in past 12 months? No   Fall with injury? -   Number of falls in past 12 months -   Fall Risk Score -       Abuse Screening:  :     Abuse Screening Questionnaire 4/28/2017   Do you ever feel afraid of your partner? N   Are you in a relationship with someone who physically or mentally threatens you? N   Is it safe for you to go home? Y       ADL Screening:  :     ADL Assessment 5/23/2018   Feeding yourself No Help Needed   Getting from bed to chair No Help Needed   Getting dressed No Help Needed   Bathing or showering No Help Needed   Walk across the room (includes cane/walker) No Help Needed   Using the telphone No Help Needed   Taking your medications No Help Needed   Preparing meals No Help Needed   Managing money (expenses/bills) No Help Needed   Moderately strenuous housework (laundry) No Help Needed   Shopping for personal items (toiletries/medicines) No Help Needed   Shopping for groceries No Help Needed   Driving Help Needed   Climbing a flight of stairs No Help Needed   Getting to places beyond walking distances No Help Needed         Per Dr. Yandy Darden verbal order read back orders placed for poc BS and A1C. Medication reconciliation up to date and corrected with patient at this time.

## 2018-06-22 DIAGNOSIS — E10.9 TYPE 1 DIABETES MELLITUS WITHOUT COMPLICATION (HCC): ICD-10-CM

## 2018-07-12 RX ORDER — ERGOCALCIFEROL 1.25 MG/1
50000 CAPSULE ORAL
Qty: 12 CAP | Refills: 0 | OUTPATIENT
Start: 2018-07-12

## 2018-07-12 NOTE — TELEPHONE ENCOUNTER
----- Message from Santa Gutierrez sent at 7/12/2018  1:35 PM EDT -----  Regarding: Dr. Olimpia Gilliam  The patient is requesting that the doctor responds to the refill request for Rx Vitamin D that was sent by the pharmacy.  [phone]

## 2018-07-18 ENCOUNTER — TELEPHONE (OUTPATIENT)
Dept: INTERNAL MEDICINE CLINIC | Facility: CLINIC | Age: 68
End: 2018-07-18

## 2018-07-18 ENCOUNTER — OFFICE VISIT (OUTPATIENT)
Dept: ENDOCRINOLOGY | Age: 68
End: 2018-07-18

## 2018-07-18 VITALS
HEART RATE: 61 BPM | WEIGHT: 132.5 LBS | SYSTOLIC BLOOD PRESSURE: 153 MMHG | DIASTOLIC BLOOD PRESSURE: 69 MMHG | HEIGHT: 58 IN | BODY MASS INDEX: 27.81 KG/M2

## 2018-07-18 DIAGNOSIS — I10 ESSENTIAL HYPERTENSION WITH GOAL BLOOD PRESSURE LESS THAN 130/80: ICD-10-CM

## 2018-07-18 DIAGNOSIS — E10.9 TYPE 1 DIABETES MELLITUS WITHOUT COMPLICATION (HCC): Primary | ICD-10-CM

## 2018-07-18 DIAGNOSIS — E78.5 HYPERLIPIDEMIA, UNSPECIFIED HYPERLIPIDEMIA TYPE: ICD-10-CM

## 2018-07-18 RX ORDER — CHOLECALCIFEROL (VITAMIN D3) 125 MCG
2000 CAPSULE ORAL
COMMUNITY

## 2018-07-18 NOTE — TELEPHONE ENCOUNTER
Notified Pt of message and she said she was told to take 2000u last week, and would be taking that.           Jaymie Garduno NP   You 32 minutes ago (12:29 PM)                 As her last vitamin D was normal and now stable, she should take Vitamin D3 1000 units daily. (Routing comment)                        You routed conversation to Jaymie Garduno NP 44 minutes ago (12:17 PM)                       Miranda Henderson routed conversation to You; Sharon Reyes LPN; Ziggy Dutton LPN 4 hours ago (1:86 AM)                       [facility] [phone]  Shahla Starks 4 hours ago (8:49 AM)                   Incoming call:  Vitamin D3                        Corina Starks 4 hours ago (8:49 AM)                              Dr. Mona Anaya / refill   Received: Today         Britt CERVANTES [facility] Front Office                            Patient is requesting a callback from 's nurse.  Stated taking Vitamin D3\"\" but when she noticed she was taking Vitamin D2 50,000mg.  Wanted to know if she's taking correct dosage.  Best contact [phone] can leave message)             Message was sent to [address] [place] [address] Rd :- ) thanks angus

## 2018-07-18 NOTE — MR AVS SNAPSHOT
[address] [place] [address] [address] [id number] [phone] Patient: Geo Weston MRN: [id number] RAT:[birth year] Visit Information Date & Time Provider Department Dept. Phone Encounter #  
 7/18/2018  9:50 AM Zelda Loomis, [address] [facility] Diabetes and Endocrinology [phone] Follow-up Instructions Return in about 3 months (around 10/18/2018). Your Appointments 8/22/2018  9:50 AM  
ESTABLISHED PATIENT with Chau Anaya MD  
[facility] Internal Medicine of [facility]) Appt Note: 3 months (around 8/23/2018), or if symptoms worsen or fail to improve, for Chronic pain, DM, HTN/$0CP KMP 05/23/18  
 [address] [phone]  
  
   
 [address] [facility] Upcoming Health Maintenance Date Due FOBT Q 1 YEAR AGE 50-75 7/6/2018 Influenza Age 5 to Adult 8/1/2018 FOOT EXAM Q1 8/22/2018 EYE EXAM RETINAL OR DILATED Q1 9/13/2018 Pneumococcal 65+ Low/Medium Risk (2 of 2 - PPSV23) 9/27/2018 MICROALBUMIN Q1 9/27/2018 HEMOGLOBIN A1C Q6M 11/23/2018 LIPID PANEL Q1 2/21/2019 BREAST CANCER SCRN MAMMOGRAM 8/2/2019 GLAUCOMA SCREENING Q2Y 9/13/2019 DTaP/Tdap/Td series (2 - Td) 4/27/2027 Allergies as of 7/18/2018  Review Complete On: 7/18/2018 By: Zelda Loomis MD  
  
 Severity Noted Reaction Type Reactions Compazine [Prochlorperazine Edisylate]  04/12/2010    Other (comments) Tongue swelling, drooling Naprosyn [Naproxen]  05/19/2017   Side Effect Other (comments) Belching,gas,stomach pain Percocet [Oxycodone-acetaminophen]  01/18/2018    Other (comments) Visual problems/seeings in triplicate per patient Current Immunizations  Reviewed on 11/1/2017 Name Date  
 TD Vaccine 4/13/2010  2:21 AM  
  
 Not reviewed this visit You Were Diagnosed With   
  
 Codes Comments Type 1 diabetes mellitus without complication (HCC)    -  Primary ICD-10-CM: E10.9 ICD-9-CM: 250.01 Essential hypertension with goal blood pressure less than 130/80     ICD-10-CM: I10 
ICD-9-CM: 401.9 Hyperlipidemia, unspecified hyperlipidemia type     ICD-10-CM: E78.5 ICD-9-CM: 272.4 Vitals BP Pulse Height(growth percentile) Weight(growth percentile) BMI OB Status 153/69 (BP 1 Location: Right arm, BP Patient Position: Sitting) 61 4' 10\" (1.473 m) 132 lb 8 oz (60.1 kg) 27.69 kg/m2 Postmenopausal  
 Smoking Status Never Smoker Vitals History BMI and BSA Data Body Mass Index Body Surface Area  
 27.69 kg/m 2 1.57 m 2 Preferred Pharmacy Pharmacy Name Phone [id number] [address] [place] [address] [phone] Your Updated Medication List  
  
   
This list is accurate as of 7/18/18 10:17 AM.  Always use your most recent med list.  
  
  
  
  
 [address] Generic drug:  Blood-Glucose Meter ACCU-CHEK CAROL PLUS TEST STRP strip Generic drug:  glucose blood VI test strips Testing blood sugar 3 times per day Dx E11.9 [address] Generic drug:  Lancets Use to test blood sugar 3 times per day for Dx: E11.9  
  
 aspirin 325 mg tablet Commonly known as:  ASPIRIN Take 1 Tab by mouth daily. * B-D INSULIN SYRINGE LO-DOSE  
by Does Not Apply route. Use to inject insulin twice a day for * Insulin Syringe-Needle U-100 1/2 mL 28 gauge x 1/2\" Syrg Commonly known as:  BD LO-DOSE MICRO-FINE IV Use to inject insulin twice daily dx DULoxetine 30 mg capsule Commonly known as:  CYMBALTA Take 2 Caps by mouth daily. For pain and depression. ergocalciferol 50,000 unit capsule Commonly known as:  ERGOCALCIFEROL Take 1 Cap by mouth every seven (7) days. Insulin Syringe-Needle, Dis Un 0.3 mL 30 gauge x 5/16\" Syrg Use to inject insulin twice a day for type 1 diabetes  
  
 losartan 50 mg tablet Commonly known as:  COZAAR  
TAKE 1 TABLET EVERY DAY  Indications: hypertension  
  
 meloxicam 15 mg tablet Commonly known as:  MOBIC Take 1 Tab by mouth daily. Take with food. Indications: OSTEOARTHRITIS NovoLIN 70/30 U-100 Insulin 100 unit/mL (70-30) injection Generic drug:  insulin NPH/insulin regular  
by SubCUTAneous route. Inject 26 units am  16 units (non work day)pm or 8 units (work day)pm Take 1/2 doses if BS less than or greater than 100 No doses if BS less than or greater than 70 (drink orange juice)  
  
 rosuvastatin 20 mg tablet Commonly known as:  CRESTOR Take 1 Tab by mouth nightly. Indications: hyperlipidemia VITAMIN D3 2,000 unit Tab Generic drug:  cholecalciferol (vitamin D3) Take  by mouth. ZyrTEC 10 mg Cap Generic drug:  Cetirizine * Notice: This list has 2 medication(s) that are the same as other medications prescribed for you. Read the directions carefully, and ask your doctor or other care provider to review them with you. We Performed the Following HEMOGLOBIN A1C WITH EAG [97239 CPT(R)] LIPID PANEL [43123 CPT(R)] METABOLIC PANEL, COMPREHENSIVE [84419 CPT(R)] MICROALBUMIN, UR, RAND W/ MICROALB/CREAT RATIO [id number] CPT(R)] Follow-up Instructions Return in about 3 months (around 10/18/2018). Patient Instructions Novolin 70/30 insulin:  
 Work days:         26 units breakfast, 5 units with dinner Non-work days:  26 units breakfast, 16 units with dinner If sugar is <100 can take half the dose, If sugar is < 70 should hold the dose and treat Please note our new policy, you must arrive to the clinic 15 minutes before your appointment time to allow enough time for proper check-in, adequate time to spend with your doctor, and also to respect the appointment time of the next patient.  Not arriving 15 minutes in advance may result in having your appointment rescheduled for the next available day/time. 
---------------------------------------------------------------------------------------------------------------------- Below you will find a glucose log sheet which you can use to record your blood sugars. Without checking and recording what your home glucose levels are, it will be difficult to make any changes to your medication dose, even when significant changes may be needed. Please feel free to use the log below to record your home glucose levels. At the very least, I would like for you to login the entire 2-3 weeks just before your visit so we can make your visit much more productive and beneficial to you. GLUCOSE LOG SHEET: 
 
Date Breakfast Lunch Dinner Bedtime Comments ? GLUCOSE LOG SHEET: 
 
Date Breakfast Lunch Dinner Bedtime Comments ? GLUCOSE LOG SHEET: 
 
Date Breakfast Lunch Dinner Bedtime Comments ? Please note our new policy, you must arrive to the clinic 15 minutes before your appointment time to allow enough time for proper check-in, adequate time to spend with your doctor, and also to respect the appointment time of the next patient.  Not arriving 15 minutes in advance may result in having your appointment rescheduled for the next available day/time. 
---------------------------------------------------------------------------------------------------------------------- Below you will find a glucose log sheet which you can use to record your blood sugars. Without checking and recording what your home glucose levels are, it will be difficult to make any changes to your medication dose, even when significant changes may be needed. Please feel free to use the log below to record your home glucose levels. At the very least, I would like for you to login the entire 2-3 weeks just before your visit so we can make your visit much more productive and beneficial to you. GLUCOSE LOG SHEET: 
 
Date Breakfast Lunch Dinner Bedtime Comments ? GLUCOSE LOG SHEET: 
 
Date Breakfast Lunch Dinner Bedtime Comments ? GLUCOSE LOG SHEET: 
 
Date Breakfast Lunch Dinner Bedtime Comments ? Please provide this summary of care documentation to your next provider. Your primary care clinician is listed as Maxwell Charles. If you have any questions after today's visit, please call [phone].

## 2018-07-18 NOTE — PROGRESS NOTES
CHIEF COMPLAINT: f/u uncontrolled DM1    HISTORY OF PRESENT ILLNESS:   Estuardo Castellanos is a 78 y.o. female with a PMHx as noted below who presents for f/u of uncontrolled type 1 diabetes. A1c performed 2 months ago, was improving, today we are noting her home blood sugars continue to improve though she is having hypoglycemia at bedtime on work days which we have on prior visits addressed, but will need to keep modifying.      Review of home glucose:  Novolin 70/30 insulin:    Work days:         26 units breakfast, 8 units with dinner   Non-work days:  26 units breakfast, 16 units with dinner    If her sugar is <100 she can take half the dose,    If her sugar is < 70 she should hold the dose and     Home blood sugar review:       Reviewed:       AM    101-130 mostly       Dinner  140-160, occasional 200       Bedtime 118-169,   50-80 on work days     Review of most recent diabetes-related labs:  Lab Results   Component Value Date    HBA1C 9.3 (H) 02/21/2018    HBA1C 8.6 (H) 04/28/2017    HBA1C 7.8 (H) 07/13/2016    CYH9WAPE 8.4 05/23/2018    TTF5NFHL 9.0 (A) 02/21/2018    OOV2CYJA 9.5 (A) 09/27/2017    CHOL 154 02/21/2018    LDLC 73 02/21/2018    GFRAA 89 02/21/2018    GFRNA 77 02/21/2018    MCACR 39.8 (H) 09/27/2017    TSH 0.925 02/21/2018    VITD3 39.9 02/21/2018     Lab Key:  [id number] = IA-2 pancreatic islet cell autoantibody  GADLT = FELI-65 autoantibody   MCACR = Urine Microalbumin  INSUL = Insulin level  CPEPL = C-peptide level      PAST MEDICAL/SURGICAL HISTORY:   Past Medical History:   Diagnosis Date    Arthritis     Diabetes ([address]     Hypercholesterolemia     Hypertension      Past Surgical History:   Procedure Laterality Date    EYE SURG ANT SGMT PROC UNLISTED      HX TUBAL LIGATION         ALLERGIES:   Allergies   Allergen Reactions    Compazine [Prochlorperazine Edisylate] Other (comments)     Tongue swelling, drooling    Naprosyn [Naproxen] Other (comments)     Belching,gas,stomach pain    Percocet [Oxycodone-Acetaminophen] Other (comments)     Visual problems/seeings in triplicate per patient       MEDICATIONS ON ADMISSION:     Current Outpatient Prescriptions:     cholecalciferol, vitamin D3, (VITAMIN D3) 2,000 unit tab, Take  by mouth., Disp: , Rfl:     Insulin Syringe-Needle, Dis Un 0.3 mL 30 gauge x 5/16\" syrg, Use to inject insulin twice a day for type 1 diabetes, Disp: 200 Syringe, Rfl: 3    insulin NPH/insulin regular (NOVOLIN 70/30 U-100 INSULIN) 100 unit/mL (70-30) injection, by SubCUTAneous route. Inject 26 units am  16 units (non work day)pm or 8 units (work day)pm Take 1/2 doses if BS less than or greater than 100 No doses if BS less than or greater than 70 (drink orange juice), Disp: , Rfl:     meloxicam (MOBIC) 15 mg tablet, Take 1 Tab by mouth daily. Take with food. Indications: OSTEOARTHRITIS, Disp: 90 Tab, Rfl: 1    rosuvastatin (CRESTOR) 20 mg tablet, Take 1 Tab by mouth nightly. Indications: hyperlipidemia, Disp: 90 Tab, Rfl: 3    ACCU-CHEK SOFTCLIX LANCETS misc, Use to test blood sugar 3 times per day for Dx: E11.9, Disp: 400 Each, Rfl: 3    ACCU-CHEK CAROL PLUS TEST STRP strip, Testing blood sugar 3 times per day Dx E11.9, Disp: 400 Strip, Rfl: 3    losartan (COZAAR) 50 mg tablet, TAKE 1 TABLET EVERY DAY  Indications: hypertension, Disp: 90 Tab, Rfl: 3    Insulin Syringe-Needle U-100 (BD LO-DOSE MICRO-FINE IV) 1/2 mL 28 gauge x 1/2\" syrg, Use to inject insulin twice daily dx, Disp: 200 Syringe, Rfl: 3    ACCU-CHEK CAROL PLUS METER misc, , Disp: , Rfl:     SYRINGE-NEEDLE,INSULIN,0.5 ML (B-D INSULIN SYRINGE LO-DOSE), by Does Not Apply route. Use to inject insulin twice a day for, Disp: , Rfl:     aspirin (ASPIRIN) 325 mg tablet, Take 1 Tab by mouth daily. , Disp: 30 Tab, Rfl: 5    DULoxetine (CYMBALTA) 30 mg capsule, Take 2 Caps by mouth daily. For pain and depression. , Disp: 60 Cap, Rfl: 2    ergocalciferol (ERGOCALCIFEROL) 50,000 unit capsule, Take 1 Cap by mouth every seven (7) days. , Disp: 12 Cap, Rfl: 0    ZYRTEC 10 mg cap, , Disp: , Rfl:     SOCIAL HISTORY:   Social History     Social History    Marital status:      Spouse name: N/A    Number of children: N/A    Years of education: N/A     Occupational History    Not on file. Social History Main Topics    Smoking status: Never Smoker    Smokeless tobacco: Never Used    Alcohol use No    Drug use: No    Sexual activity: Not on file     Other Topics Concern    Not on file     Social History Narrative       FAMILY HISTORY:  Family History   Problem Relation Age of Onset    Cancer Mother     Diabetes Sister     Heart Disease Sister     Diabetes Brother     Heart Disease Brother     Diabetes Maternal Grandmother     Diabetes Paternal Grandmother        REVIEW OF SYSTEMS: Complete ROS assessed and noted for that which is described above, all else are negative.   Eyes: normal  ENT: normal  CVS: normal  Resp: normal  GI: normal  : normal  GYN: normal  Endocrine: normal  Integument: normal  Musculoskeletal: normal  Neuro: normal  Psych: normal      PHYSICAL EXAMINATION:    VITAL SIGNS:  Visit Vitals    /69 (BP 1 Location: Right arm, BP Patient Position: Sitting)    Pulse 61    Ht 4' 10\" (1.473 m)    Wt 132 lb 8 oz (60.1 kg)    BMI 27.69 kg/m2       GENERAL: NCAT, Sitting comfortably, NAD  EYES: EOMI, non-icteric, no proptosis  Ear/Nose/Throat: NCAT, no inflammation, no masses  LYMPH NODES: No LAD  CARDIOVASCULAR: S1 S2, RRR, No murmur, 2+ radial pulses  RESPIRATORY: CTA b/l, no wheeze/rales  GASTROINTESTINAL: NT, ND  MUSCULOSKELETAL: Normal ROM, no atrophy  SKIN: warm, no edema/rash/ or other skin changes  NEUROLOGIC: 5/5 power all extremities, no tremor, AAOx3  PSYCHIATRIC: Normal affect, Normal insight and judgement    Diabetic foot exam:     Left Foot:   Visual Exam: callous - at ball of foot, also with sandra toe deformity   Pulse DP: 2+ (normal)   Filament test: normal sensation Vibratory sensation: Vibratory sensation: normal       Right Foot:   Visual Exam: normal  but with hammertoe deformity   Pulse DP: 2+ (normal)   Filament test: normal sensation    Vibratory sensation: Vibratory sensation: normal      REVIEW OF LABORATORY AND RADIOLOGY DATA:   Labs and documentation have been reviewed as described above. ASSESSMENT AND PLAN:   Estuardo Castellanos is a 78 y.o. female with a PMHx as noted above who presents for f/u of uncontrolled type 1 diabetes. Problems:  Type 1 diabetes Uncontrolled  Hyperlipidemia  Hypertension    Patient still with hypoglycemia at bedtime on work days despite continuing to reduce the dinner dose on those days. We will reduce that dose further. It is interesting the difference between sugars on work days vs non work days at bedtime. Plan as below. PLAN  Type 1 Diabetes  Medications:  Novolin 70/30 insulin:    Work days:         26 units breakfast, 5 units with dinner   Non-work days:  26 units breakfast, 16 units with dinner    If her sugar is <100 she can take half the dose,    If her sugar is < 70 she should hold the dose and   Advised to check glucose  qAM/Dinner/Bedtime    HTN:  losartan  HLD:  crestor    Labs: Provided with lab slip to complete her 2018 DM panel, she reports she may prefer to have it completed with Dr. Kirsty Khoury, and that is ok with me. RTC: I would like to see them back in 3 months. Radha Andersen.  [address] [facility]

## 2018-07-18 NOTE — PATIENT INSTRUCTIONS
Novolin 70/30 insulin:    Work days:         26 units breakfast, 5 units with dinner   Non-work days:  26 units breakfast, 16 units with dinner    If sugar is <100 can take half the dose,    If sugar is < 70 should hold the dose and treat      Please note our new policy, you must arrive to the clinic 15 minutes before your appointment time to allow enough time for proper check-in, adequate time to spend with your doctor, and also to respect the appointment time of the next patient. Not arriving 15 minutes in advance may result in having your appointment rescheduled for the next available day/time.  ----------------------------------------------------------------------------------------------------------------------    Below you will find a glucose log sheet which you can use to record your blood sugars. Without checking and recording what your home glucose levels are, it will be difficult to make any changes to your medication dose, even when significant changes may be needed. Please feel free to use the log below to record your home glucose levels. At the very least, I would like for you to login the entire 2-3 weeks just before your visit so we can make your visit much more productive and beneficial to you. GLUCOSE LOG SHEET:    Date Breakfast Lunch Dinner Bedtime Comments ? GLUCOSE LOG SHEET:    Date Breakfast Lunch Dinner Bedtime Comments ? GLUCOSE LOG SHEET:    Date Breakfast Lunch Dinner Bedtime Comments ? Please note our new policy, you must arrive to the clinic 15 minutes before your appointment time to allow enough time for proper check-in, adequate time to spend with your doctor, and also to respect the appointment time of the next patient. Not arriving 15 minutes in advance may result in having your appointment rescheduled for the next available day/time.  ----------------------------------------------------------------------------------------------------------------------    Below you will find a glucose log sheet which you can use to record your blood sugars. Without checking and recording what your home glucose levels are, it will be difficult to make any changes to your medication dose, even when significant changes may be needed. Please feel free to use the log below to record your home glucose levels. At the very least, I would like for you to login the entire 2-3 weeks just before your visit so we can make your visit much more productive and beneficial to you. GLUCOSE LOG SHEET:    Date Breakfast Lunch Dinner Bedtime Comments ? GLUCOSE LOG SHEET:    Date Breakfast Lunch Dinner Bedtime Comments ? GLUCOSE LOG SHEET:    Date Breakfast Lunch Dinner Bedtime Comments ?

## 2018-09-11 ENCOUNTER — APPOINTMENT (OUTPATIENT)
Dept: GENERAL RADIOLOGY | Age: 68
End: 2018-09-11
Attending: PHYSICIAN ASSISTANT
Payer: OTHER MISCELLANEOUS

## 2018-09-11 ENCOUNTER — HOSPITAL ENCOUNTER (EMERGENCY)
Age: 68
Discharge: HOME | End: 2018-09-11
Attending: EMERGENCY MEDICINE
Payer: OTHER MISCELLANEOUS

## 2018-09-11 VITALS
RESPIRATION RATE: 16 BRPM | BODY MASS INDEX: 28 KG/M2 | TEMPERATURE: 98.1 F | WEIGHT: 133.38 LBS | HEART RATE: 78 BPM | SYSTOLIC BLOOD PRESSURE: 133 MMHG | OXYGEN SATURATION: 98 % | HEIGHT: 58 IN | DIASTOLIC BLOOD PRESSURE: 81 MMHG

## 2018-09-11 DIAGNOSIS — S40.021A CONTUSION OF RIGHT UPPER EXTREMITY, INITIAL ENCOUNTER: Primary | ICD-10-CM

## 2018-09-11 PROCEDURE — 73030 X-RAY EXAM OF SHOULDER: CPT

## 2018-09-11 PROCEDURE — 99282 EMERGENCY DEPT VISIT SF MDM: CPT

## 2018-09-11 PROCEDURE — 73110 X-RAY EXAM OF WRIST: CPT

## 2018-09-11 PROCEDURE — 73060 X-RAY EXAM OF HUMERUS: CPT

## 2018-09-11 NOTE — ED PROVIDER NOTES
EMERGENCY DEPARTMENT HISTORY AND PHYSICAL EXAM      Date: 9/11/2018  Patient Name: Kylie Caballero    History of Presenting Illness     Chief Complaint   Patient presents with    Arm Pain     Ambulatory w/ c/o RIGHT arm pain from shoulder down after tripping at work last night and falling, denies LOC or hitting head.  Fall       History Provided By: Patient    HPI: Kylie Caballero, 78 y.o. female with PMHx significant for DM, HTN, arthritis, presents ambulatory to the ED with cc of a sudden onset, R arm pain s/p a GLF last night. Pt denies any associated symptoms. She states she was in the back of the stock room, retrieving hangers, when she had turned, and her shoe had gotten caught, and she had fallen onto her R side. Pt denies any LOC or hitting her head. Of note, the pt reports she has an available ride home after treatment today. There are no other complaints, changes, or physical findings at this time. PCP: Regan Pittman MD    Current Outpatient Prescriptions   Medication Sig Dispense Refill    cholecalciferol, vitamin D3, (VITAMIN D3) 2,000 unit tab Take  by mouth.  Insulin Syringe-Needle, Dis Un 0.3 mL 30 gauge x 5/16\" syrg Use to inject insulin twice a day for type 1 diabetes 200 Syringe 3    insulin NPH/insulin regular (NOVOLIN 70/30 U-100 INSULIN) 100 unit/mL (70-30) injection by SubCUTAneous route. Inject 26 units am   16 units (non work day)pm or  8 units (work day)pm  Take 1/2 doses if BS less than or greater than 100  No doses if BS less than or greater than 70  (drink orange juice)      DULoxetine (CYMBALTA) 30 mg capsule Take 2 Caps by mouth daily. For pain and depression. 60 Cap 2    meloxicam (MOBIC) 15 mg tablet Take 1 Tab by mouth daily. Take with food. Indications: OSTEOARTHRITIS 90 Tab 1    rosuvastatin (CRESTOR) 20 mg tablet Take 1 Tab by mouth nightly.  Indications: hyperlipidemia 90 Tab 3    ergocalciferol (ERGOCALCIFEROL) 50,000 unit capsule Take 1 Cap by mouth every seven (7) days. 12 Cap 0    ACCU-CHEK SOFTCLIX LANCETS misc Use to test blood sugar 3 times per day for Dx: E11.9 400 Each 3    ACCU-CHEK CAROL PLUS TEST STRP strip Testing blood sugar 3 times per day Dx E11.9 400 Strip 3    ZYRTEC 10 mg cap       losartan (COZAAR) 50 mg tablet TAKE 1 TABLET EVERY DAY  Indications: hypertension 90 Tab 3    Insulin Syringe-Needle U-100 (BD LO-DOSE MICRO-FINE IV) 1/2 mL 28 gauge x 1/2\" syrg Use to inject insulin twice daily dx 200 Syringe 3    ACCU-CHEK CAROL PLUS METER misc       SYRINGE-NEEDLE,INSULIN,0.5 ML (B-D INSULIN SYRINGE LO-DOSE) by Does Not Apply route. Use to inject insulin twice a day for      aspirin (ASPIRIN) 325 mg tablet Take 1 Tab by mouth daily. 30 Tab 5       Past History     Past Medical History:  Past Medical History:   Diagnosis Date    Arthritis     Diabetes ([facility] [address]     Hypercholesterolemia     Hypertension        Past Surgical History:  Past Surgical History:   Procedure Laterality Date    EYE SURG ANT SGMT PROC UNLISTED      HX TUBAL LIGATION         Family History:  Family History   Problem Relation Age of Onset    Cancer Mother     Diabetes Sister     Heart Disease Sister     Diabetes Brother     Heart Disease Brother     Diabetes Maternal Grandmother     Diabetes Paternal Grandmother        Social History:  Social History   Substance Use Topics    Smoking status: Never Smoker    Smokeless tobacco: Never Used    Alcohol use No       Allergies: Allergies   Allergen Reactions    Compazine [Prochlorperazine Edisylate] Other (comments)     Tongue swelling, drooling    Naprosyn [Naproxen] Other (comments)     Belching,gas,stomach pain    Percocet [Oxycodone-Acetaminophen] Other (comments)     Visual problems/seeings in triplicate per patient       Review of Systems   Review of Systems   Constitutional: Negative for chills and fever. HENT: Negative for congestion and sore throat. Eyes: Negative for visual disturbance.    Respiratory: Negative for cough and shortness of breath. Cardiovascular: Negative for chest pain and leg swelling. Gastrointestinal: Negative for abdominal pain, blood in stool, diarrhea and nausea. Endocrine: Negative for polyuria. Genitourinary: Negative for dysuria, flank pain, vaginal bleeding and vaginal discharge. Musculoskeletal: Positive for myalgias (+RUE). Skin: Negative for rash. Allergic/Immunologic: Negative for immunocompromised state. Neurological: Negative for syncope, weakness and headaches. Psychiatric/Behavioral: Negative for confusion. Physical Exam   Physical Exam   Constitutional: She is oriented to person, place, and time. She appears well-developed and well-nourished. HENT:   Head: Normocephalic and atraumatic. Moist mucous membranes   Eyes: Conjunctivae are normal. Pupils are equal, round, and reactive to light. Right eye exhibits no discharge. Left eye exhibits no discharge. Neck: Normal range of motion. Neck supple. No tracheal deviation present. Cardiovascular: Normal rate, regular rhythm and normal heart sounds. No murmur heard. Pulmonary/Chest: Effort normal and breath sounds normal. No respiratory distress. She has no wheezes. She has no rales. Abdominal: Soft. Bowel sounds are normal. There is no tenderness. There is no rebound and no guarding. Musculoskeletal: Normal range of motion. She exhibits no edema, tenderness or deformity. NVI  Normal strength of wrist and hand  FROM at the elbow  No bruising or signs of trauma   Neurological: She is alert and oriented to person, place, and time. Skin: Skin is warm and dry. No rash noted. No erythema. Psychiatric: Her behavior is normal.   Nursing note and vitals reviewed. Diagnostic Study Results     Labs - No results found for this or any previous visit (from the past 12 hour(s)).     Radiologic Studies -   XR WRIST RT AP/LAT/OBL MIN 3V   Final Result   Initial Result:     Impression:     IMPRESSION:  No acute abnormality.       Narrative:     EXAM: XR WRIST RT AP/LAT/OBL MIN 3V    INDICATION:  pain/fall. COMPARISON: None. FINDINGS: 4  views of the right wrist demonstrate no fracture or other acute  osseous or articular abnormality.  The soft tissues are within normal limits. The bones are osteopenic. There are scattered degenerative changes. XR SHOULDER RT AP/LAT MIN 2 V   Final Result   Initial Result:     Impression:     IMPRESSION:  No acute abnormality.       Narrative:     EXAM:  XR SHOULDER RT AP/LAT MIN 2 V    INDICATION:   pain/fall. COMPARISON: None. FINDINGS: Three views of the right shoulder demonstrate no fracture, dislocation  or other acute abnormality. The bones appear osteopenic. There is glenohumeral  and acromioclavicular DJD. XR HUMERUS RT   Final Result   Initial Result:     Impression:     IMPRESSION:  No acute abnormality.           Narrative:     EXAM:  XR HUMERUS RT    INDICATION:   pain/fall. COMPARISON: None. FINDINGS: Two views of the right humerus demonstrate no fracture or other acute  osseous, articular or soft tissue abnormality.  There is glenohumeral and  acromioclavicular DJD. Medical Decision Making   I am the first provider for this patient. I reviewed the vital signs, available nursing notes, past medical history, past surgical history, family history and social history. Vital Signs-Reviewed the patient's vital signs. Patient Vitals for the past 12 hrs:   Temp Pulse Resp BP SpO2   09/11/18 1533 98.1 °F (36.7 °C) 78 16 133/81 98 %   09/11/18 1342 98.4 °F (36.9 °C) 81 19 152/80 98 %     Records Reviewed: Nursing Notes and Old Medical Records    Provider Notes (Medical Decision Making):   DDx: sprain, strain, contusion, fracture    ED Course:   Initial assessment performed. The patients presenting problems have been discussed, and they are in agreement with the care plan formulated and outlined with them.   I have encouraged them to ask questions as they arise throughout their visit. PROGRESS NOTE:  3:14 PM  Pt declines pain medication. Critical Care Time:   0    Disposition:  DISCHARGE NOTE  3:45 PM  The patient has been re-evaluated and is ready for discharge. Reviewed available results with patient. Counseled patient on diagnosis and care plan. Patient has expressed understanding, and all questions have been answered. Patient agrees with plan and agrees to follow up as recommended, or return to the ED if their symptoms worsen. Discharge instructions have been provided and explained to the patient, along with reasons to return to the ED. PLAN:  1. Discharge  Current Discharge Medication List        2. Follow-up Information     Follow up With Details Comments [address], [place] In 1 week if symptoms are not improving. [address]  [phone]      [facility] EMERGENCY DEPT  If symptoms worsen [address] [facility] [address]  [phone]        Return to ED if worse     Diagnosis     Clinical Impression:   1. Contusion of right upper extremity, initial encounter        Attestations: This note is prepared by Marcio Hall, acting as Scribe for Joey Burroughs DO. Joey Burroughs DO: The scribe's documentation has been prepared under my direction and personally reviewed by me in its entirety. I confirm that the note above accurately reflects all work, treatment, procedures, and medical decision making performed by me. This note will not be viewable in [address]

## 2018-09-11 NOTE — ED NOTES
Dr. Nikki Lebron has reviewed discharge instructions with the patient. The patient verbalized understanding. Pt. A&Ox4, respirations even and unlabored. VS stable as noted in flowsheet. Declined wheelchair assist from department; paperwork in hand.

## 2018-09-11 NOTE — ED NOTES
Assumed care of patient. Patient placed in position of comfort. Call bell in reach. Skin warm and dry. Respirations even and unlabored. In no apparent distress at this time. Pt presents ambulatory into the ED with c/o pain and decreased ROM to RUE d/t tripping and falling yesterday at work; no obvious deformities noted; PMS intact. Denies head injury/LOC. A&O x 4.

## 2018-09-11 NOTE — PROGRESS NOTES
Physical Therapy Screening:  Physical Therapy consult is not indicated at this time. A screening was performed on this patient's chart based on their entrance into the emergency department and potential need for physical therapy identified. The patients chart was reviewed and the patient was interviewed/screened. Met with pt who states she tripped last night at work and fell onto her R side. She currently has R shoulder pain and is awaiting xray results. Pt lives alone in mobile home with ~3 steps to enter with rails. She has had previous falls but has remained independent in amb without device. Discussed benefits of OPPT services for hx of falls to improve balance and decrease fall risk. She states she is not interested in pursuing therapy at this time. She voices no other needs and states she has been able to walk without issue since the fall yesterday but has issues with the R shoulder. Please consult physical therapy if any therapy needs arise. Thank you.      Lori Adams, PT

## 2018-09-11 NOTE — ED NOTES
Bedside and Verbal shift change report given to [place]., RN (oncoming nurse) by this RN (offgoing nurse). Report included the following information SBAR.

## 2018-09-15 ENCOUNTER — APPOINTMENT (OUTPATIENT)
Dept: GENERAL RADIOLOGY | Age: 68
End: 2018-09-15
Payer: OTHER MISCELLANEOUS

## 2018-09-15 ENCOUNTER — HOSPITAL ENCOUNTER (EMERGENCY)
Age: 68
Discharge: HOME | End: 2018-09-15
Attending: EMERGENCY MEDICINE | Admitting: EMERGENCY MEDICINE
Payer: OTHER MISCELLANEOUS

## 2018-09-15 VITALS
TEMPERATURE: 98.3 F | BODY MASS INDEX: 28 KG/M2 | HEIGHT: 58 IN | RESPIRATION RATE: 16 BRPM | DIASTOLIC BLOOD PRESSURE: 96 MMHG | WEIGHT: 133.38 LBS | OXYGEN SATURATION: 96 % | SYSTOLIC BLOOD PRESSURE: 126 MMHG | HEART RATE: 89 BPM

## 2018-09-15 DIAGNOSIS — G89.4 CHRONIC PAIN SYNDROME: ICD-10-CM

## 2018-09-15 DIAGNOSIS — M79.601 RIGHT ARM PAIN: Primary | ICD-10-CM

## 2018-09-15 DIAGNOSIS — M25.611 DECREASED RANGE OF MOTION OF RIGHT SHOULDER: ICD-10-CM
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
## 2018-09-15 PROCEDURE — 99282 EMERGENCY DEPT VISIT SF MDM: CPT

## 2018-09-15 PROCEDURE — 73030 X-RAY EXAM OF SHOULDER: CPT

## 2018-09-15 RX ORDER — METHOCARBAMOL 750 MG/1
750 TABLET, FILM COATED ORAL 3 TIMES DAILY
Qty: 15 TAB | Refills: 0 | Status: SHIPPED | OUTPATIENT
Start: 2018-09-15 | End: 2018-09-20

## 2018-09-15 RX ORDER — LIDOCAINE 50 MG/G
PATCH TOPICAL
Qty: 5 EACH | Refills: 0 | Status: SHIPPED | OUTPATIENT
Start: 2018-09-15 | End: 2018-12-19

## 2018-09-15 NOTE — DISCHARGE INSTRUCTIONS
Shoulder Pain: Care Instructions  Your Care Instructions    You can hurt your shoulder by using it too much during an activity, such as fishing or baseball. It can also happen as part of the everyday wear and tear of getting older. Shoulder injuries can be slow to heal, but your shoulder should get better with time. Your doctor may recommend a sling to rest your shoulder. If you have injured your shoulder, you may need testing and treatment. Follow-up care is a key part of your treatment and safety. Be sure to make and go to all appointments, and call your doctor if you are having problems. It's also a good idea to know your test results and keep a list of the medicines you take. How can you care for yourself at home? · Take pain medicines exactly as directed. ¨ If the doctor gave you a prescription medicine for pain, take it as prescribed. ¨ If you are not taking a prescription pain medicine, ask your doctor if you can take an over-the-counter medicine. ¨ Do not take two or more pain medicines at the same time unless the doctor told you to. Many pain medicines contain acetaminophen, which is Tylenol. Too much acetaminophen (Tylenol) can be harmful. · If your doctor recommends that you wear a sling, use it as directed. Do not take it off before your doctor tells you to. · Put ice or a cold pack on the sore area for 10 to 20 minutes at a time. Put a thin cloth between the ice and your skin. · If there is no swelling, you can put moist heat, a heating pad, or a warm cloth on your shoulder. Some doctors suggest alternating between hot and cold. · Rest your shoulder for a few days. If your doctor recommends it, you can then begin gentle exercise of the shoulder, but do not lift anything heavy. When should you call for help? Call 911 anytime you think you may need emergency care. For example, call if:    · You have chest pain or pressure. This may occur with:  ¨ Sweating.   ¨ Shortness of breath. ¨ Nausea or vomiting. ¨ Pain that spreads from the chest to the neck, jaw, or one or both shoulders or arms. ¨ Dizziness or lightheadedness. ¨ A fast or uneven pulse. After calling 911, chew 1 adult-strength aspirin. Wait for an ambulance. Do not try to drive yourself.     · Your arm or hand is cool or pale or changes color.    Call your doctor now or seek immediate medical care if:    · You have signs of infection, such as:  ¨ Increased pain, swelling, warmth, or redness in your shoulder. ¨ Red streaks leading from a place on your shoulder. ¨ Pus draining from an area of your shoulder. ¨ Swollen lymph nodes in your neck, armpits, or groin. ¨ A fever.    Watch closely for changes in your health, and be sure to contact your doctor if:    · You cannot use your shoulder.     · Your shoulder does not get better as expected. Where can you learn more? Go to http://sammiSteelHouseamanda.info/. Enter T817 in the search box to learn more about \"Shoulder Pain: Care Instructions. \"  Current as of: November 29, 2017  Content Version: 11.7  © 6043-3410 [employer]. Care instructions adapted under license by [employer] (which disclaims liability or warranty for this information). If you have questions about a medical condition or this instruction, always ask your healthcare professional. [address] any warranty or liability for your use of this information. Preventing Falls: Care Instructions  Your Care Instructions    Getting around your home safely can be a challenge if you have injuries or health problems that make it easy for you to fall. Loose rugs and furniture in walkways are among the dangers for many older people who have problems walking or who have poor eyesight. People who have conditions such as arthritis, osteoporosis, or dementia also have to be careful not to fall.   You can make your home safer with a few simple measures. Follow-up care is a key part of your treatment and safety. Be sure to make and go to all appointments, and call your doctor if you are having problems. It's also a good idea to know your test results and keep a list of the medicines you take. How can you care for yourself at home? Taking care of yourself  · You may get dizzy if you do not drink enough water. To prevent dehydration, drink plenty of fluids, enough so that your urine is light yellow or clear like water. Choose water and other caffeine-free clear liquids. If you have kidney, heart, or liver disease and have to limit fluids, talk with your doctor before you increase the amount of fluids you drink. · Exercise regularly to improve your strength, muscle tone, and balance. Walk if you can. Swimming may be a good choice if you cannot walk easily. · Have your vision and hearing checked each year or any time you notice a change. If you have trouble seeing and hearing, you might not be able to avoid objects and could lose your balance. · Know the side effects of the medicines you take. Ask your doctor or pharmacist whether the medicines you take can affect your balance. Sleeping pills or sedatives can affect your balance. · Limit the amount of alcohol you drink. Alcohol can impair your balance and other senses. · Ask your doctor whether calluses or corns on your feet need to be removed. If you wear loose-fitting shoes because of calluses or corns, you can lose your balance and fall. · Talk to your doctor if you have numbness in your feet. Preventing falls at home  · Remove raised doorway thresholds, throw rugs, and clutter. Repair loose carpet or raised areas in the floor. · Move furniture and electrical cords to keep them out of walking paths. · Use nonskid floor wax, and wipe up spills right away, especially on ceramic tile floors. · If you use a walker or cane, put rubber tips on it.  If you use crutches, clean the bottoms of them regularly with an abrasive pad, such as steel wool. · Keep your house well lit, especially [place], and outside walkways. Use night-lights in areas such as hallways and bathrooms. Add extra light switches or use remote switches (such as switches that go on or off when you clap your hands) to make it easier to turn lights on if you have to get up during the night. · Install sturdy handrails on stairways. · Move items in your cabinets so that the things you use a lot are on the lower shelves (about waist level). · Keep a cordless phone and a flashlight with new batteries by your bed. If possible, put a phone in each of the main rooms of your house, or carry a cell phone in case you fall and cannot reach a phone. Or, you can wear a device around your neck or wrist. You push a button that sends a signal for help. · Wear low-heeled shoes that fit well and give your feet good support. Use footwear with nonskid soles. Check the heels and soles of your shoes for wear. Repair or replace worn heels or soles. · Do not wear socks without shoes on wood floors. · Walk on the grass when the sidewalks are slippery. If you live in an area that gets snow and ice in the winter, sprinkle salt on slippery steps and sidewalks. Preventing falls in the bath  · Install grab bars and nonskid mats inside and outside your shower or tub and near the toilet and sinks. · Use shower chairs and bath benches. · Use a hand-held shower head that will allow you to sit while showering. · Get into a tub or shower by putting the weaker leg in first. Get out of a tub or shower with your strong side first.  · Repair loose toilet seats and consider installing a raised toilet seat to make getting on and off the toilet easier. · Keep your bathroom door unlocked while you are in the shower. Where can you learn more? Go to http://sammi-amanda.info/.   Enter [id number] [id number] in the search box to learn more about \"Preventing Falls: Care Instructions. \"  Current as of: May 12, 2017  Content Version: 11.7  © 0547-8532 [employer]. Care instructions adapted under license by [employer] (which disclaims liability or warranty for this information). If you have questions about a medical condition or this instruction, always ask your healthcare professional. [address] any warranty or liability for your use of this information. Rotator Cuff: Exercises  Your Care Instructions  Here are some examples of typical rehabilitation exercises for your condition. Start each exercise slowly. Ease off the exercise if you start to have pain. Your doctor or physical therapist will tell you when you can start these exercises and which ones will work best for you. How to do the exercises  Pendulum swing    If you have pain in your back, do not do this exercise. 1. Hold on to a table or the back of a chair with your good arm. Then bend forward a little and let your sore arm hang straight down. This exercise does not use the arm muscles. Rather, use your legs and your hips to create movement that makes your arm swing freely. 2. Use the movement from your hips and legs to guide the slightly swinging arm back and forth like a pendulum (or elephant trunk). Then guide it in circles that start small (about the size of a dinner plate). Make the circles a bit larger each day, as your pain allows. 3. Do this exercise for 5 minutes, 5 to 7 times each day. 4. As you have less pain, try bending over a little farther to do this exercise. This will increase the amount of movement at your shoulder. Posterior stretching exercise    1. Hold the elbow of your injured arm with your other hand. 2. Use your hand to pull your injured arm gently up and across your body. You will feel a gentle stretch across the back of your injured shoulder. 3. Hold for at least 15 to 30 seconds. Then slowly lower your arm.   4. Repeat 2 to 4 times. Up-the-back stretch    Your doctor or physical therapist may want you to wait to do this stretch until you have regained most of your range of motion and strength. You can do this stretch in different ways. Hold any of these stretches for at least 15 to 30 seconds. Repeat them 2 to 4 times. 1. Put your hand in your back pocket. Let it rest there to stretch your shoulder. 2. With your other hand, hold your injured arm (palm outward) behind your back by the wrist. Pull your arm up gently to stretch your shoulder. 3. Next, put a towel over your other shoulder. Put the hand of your injured arm behind your back. Now hold the back end of the towel. With the other hand, hold the front end of the towel in front of your body. Pull gently on the front end of the towel. This will bring your hand farther up your back to stretch your shoulder. Overhead stretch    1. Standing about an arm's length away, grasp onto a solid surface. You could use a countertop, a doorknob, or the back of a sturdy chair. 2. With your knees slightly bent, bend forward with your arms straight. Lower your upper body, and let your shoulders stretch. 3. As your shoulders are able to stretch farther, you may need to take a step or two backward. 4. Hold for at least 15 to 30 seconds. Then stand up and relax. If you had stepped back during your stretch, step forward so you can keep your hands on the solid surface. 5. Repeat 2 to 4 times. Shoulder flexion (lying down)    To make a wand for this exercise, use a piece of PVC pipe or a broom handle with the broom removed. Make the wand about a foot wider than your shoulders. 1. Lie on your back, holding a wand with both hands. Your palms should face down as you hold the wand. 2. Keeping your elbows straight, slowly raise your arms over your head. Raise them until you feel a stretch in your shoulders, upper back, and chest.  3. Hold for 15 to 30 seconds. 4. Repeat 2 to 4 times.   Shoulder rotation (lying down)    To make a wand for this exercise, use a piece of PVC pipe or a broom handle with the broom removed. Make the wand about a foot wider than your shoulders. 1. Lie on your back. Hold a wand with both hands with your elbows bent and palms up. 2. Keep your elbows close to your body, and move the wand across your body toward the sore arm. 3. Hold for 8 to 12 seconds. 4. Repeat 2 to 4 times. Wall climbing (to the side)    Avoid any movement that is straight to your side, and be careful not to arch your back. Your arm should stay about 30 degrees to the front of your side. 1. Stand with your side to a wall so that your fingers can just touch it at an angle about 30 degrees toward the front of your body. 2. Walk the fingers of your injured arm up the wall as high as pain permits. Try not to shrug your shoulder up toward your ear as you move your arm up. 3. Hold that position for a count of at least 15 to 20.  4. Walk your fingers back down to the starting position. 5. Repeat at least 2 to 4 times. Try to reach higher each time. Wall climbing (to the front)    During this stretching exercise, be careful not to arch your back. 1. Face a wall, and stand so your fingers can just touch it. 2. Keeping your shoulder down, walk the fingers of your injured arm up the wall as high as pain permits. (Don't shrug your shoulder up toward your ear.)  3. Hold your arm in that position for at least 15 to 30 seconds. 4. Slowly walk your fingers back down to where you started. 5. Repeat at least 2 to 4 times. Try to reach higher each time. Shoulder blade squeeze    1. Stand with your arms at your sides, and squeeze your shoulder blades together. Do not raise your shoulders up as you squeeze. 2. Hold 6 seconds. 3. Repeat 8 to 12 times. Scapular exercise: Arm reach    1. Lie flat on your back.  This exercise is a very slight motion that starts with your arms raised (elbows straight, arms straight). 2. From this position, reach higher toward the anthaly or ceiling. Keep your elbows straight. All motion should be from your shoulder blade only. 3. Relax your arms back to where you started. 4. Repeat 8 to 12 times. Arm raise to the side    During this strengthening exercise, your arm should stay about 30 degrees to the front of your side. 1. Slowly raise your injured arm to the side, with your thumb facing up. Raise your arm 60 degrees at the most (shoulder level is 90 degrees). 2. Hold the position for 3 to 5 seconds. Then lower your arm back to your side. If you need to, bring your \"good\" arm across your body and place it under the elbow as you lower your injured arm. Use your good arm to keep your injured arm from dropping down too fast.  3. Repeat 8 to 12 times. 4. When you first start out, don't hold any extra weight in your hand. As you get stronger, you may use a 1-pound to 2-pound dumbbell or a small can of food. Shoulder flexor and extensor exercise    These are isometric exercises. That means you contract your muscles without actually moving. 1. Push forward (flex): Stand facing a wall or doorjamb, about 6 inches or less back. Hold your injured arm against your body. Make a closed fist with your thumb on top. Then gently push your hand forward into the wall with about 25% to 50% of your strength. Don't let your body move backward as you push. Hold for about 6 seconds. Relax for a few seconds. Repeat 8 to 12 times. 2. Push backward (extend): Stand with your back flat against a wall. Your upper arm should be against the wall, with your elbow bent 90 degrees (your hand straight ahead). Push your elbow gently back against the wall with about 25% to 50% of your strength. Don't let your body move forward as you push. Hold for about 6 seconds. Relax for a few seconds. Repeat 8 to 12 times.   Scapular exercise: Wall push-ups    This exercise is best done with your fingers somewhat turned out, rather than straight up and down. 1. Stand facing a wall, about 12 inches to 18 inches away. 2. Place your hands on the wall at shoulder height. 3. Slowly bend your elbows and bring your face to the wall. Keep your back and hips straight. 4. Push back to where you started. 5. Repeat 8 to 12 times. 6. When you can do this exercise against a wall comfortably, you can try it against a counter. You can then slowly progress to the end of a couch, then to a sturdy chair, and finally to the floor. Scapular exercise: Retraction    For this exercise, you will need elastic exercise material, such as surgical tubing or Thera-Band. 1. Put the band around a solid object at about waist level. (A bedpost will work well.) Each hand should hold an end of the band. 2. With your elbows at your sides and bent to 90 degrees, pull the band back. Your shoulder blades should move toward each other. Then move your arms back where you started. 3. Repeat 8 to 12 times. 4. If you have good range of motion in your shoulders, try this exercise with your arms lifted out to the sides. Keep your elbows at a 90-degree angle. Raise the elastic band up to about shoulder level. Pull the band back to move your shoulder blades toward each other. Then move your arms back where you started. Internal rotator strengthening exercise    1. Start by tying a piece of elastic exercise material to a doorknob. You can use surgical tubing or Thera-Band. 2. Stand or sit with your shoulder relaxed and your elbow bent 90 degrees. Your upper arm should rest comfortably against your side. Squeeze a rolled towel between your elbow and your body for comfort. This will help keep your arm at your side. 3. Hold one end of the elastic band in the hand of the painful arm. 4. Slowly rotate your forearm toward your body until it touches your belly. Slowly move it back to where you started.   5. Keep your elbow and upper arm firmly tucked against the towel roll or at your side. 6. Repeat 8 to 12 times. External rotator strengthening exercise    1. Start by tying a piece of elastic exercise material to a doorknob. You can use surgical tubing or Thera-Band. (You may also hold one end of the band in each hand.)  2. Stand or sit with your shoulder relaxed and your elbow bent 90 degrees. Your upper arm should rest comfortably against your side. Squeeze a rolled towel between your elbow and your body for comfort. This will help keep your arm at your side. 3. Hold one end of the elastic band with the hand of the painful arm. 4. Start with your forearm across your belly. Slowly rotate the forearm out away from your body. Keep your elbow and upper arm tucked against the towel roll or the side of your body until you begin to feel tightness in your shoulder. Slowly move your arm back to where you started. 5. Repeat 8 to 12 times. Follow-up care is a key part of your treatment and safety. Be sure to make and go to all appointments, and call your doctor if you are having problems. It's also a good idea to know your test results and keep a list of the medicines you take. Where can you learn more? Go to http://sammi-amanda.info/. Enter Aloma Case in the search box to learn more about \"Rotator Cuff: Exercises. \"  Current as of: November 29, 2017  Content Version: 11.7  © 3458-2644 [employer]. Care instructions adapted under license by [employer] (which disclaims liability or warranty for this information). If you have questions about a medical condition or this instruction, always ask your healthcare professional. [address] any warranty or liability for your use of this information.

## 2018-09-15 NOTE — ED NOTES
Dc instructions per provider  All questions and concerns addressed  Ambulated out of ER without any difficulty

## 2018-09-15 NOTE — LETTER
[address] 
[facility] EMERGENCY DEPT 
[address] [place] [address] [address] [id number] [phone] Work/School Note Date: 9/15/2018 To Whom It May concern: Yahir Betancourt was seen and treated today in the emergency room. She may return to work in 3 days, as symptoms improve, or after cleared by [facility] physician. Sincerely, Juan Abraham

## 2018-09-15 NOTE — ED PROVIDER NOTES
EMERGENCY DEPARTMENT HISTORY AND PHYSICAL EXAM      Date: 9/15/2018  Patient Name: Svetlana Mirza    History of Presenting Illness     Chief Complaint   Patient presents with   [facility]     Patient ambulatory to triage with steady gait and complain of  right arm pain  after slipping and falling at work on Monday, Patient states that she was seen here Tuesday for same complaints and is not getting better. Patient states pain is worse upon movement       History Provided By: Patient    HPI: Svetlana Mirza, 78 y.o. female presents ambulatory to the Emergency Dept with c/o pain and decreased range of motion to the R shoulder. Pt states she had a GLF at her job on 9/10/18 with pain to her R arm after the fall. She was seen in this ED following the injury and xrays were negative. She states she has not been using the arm much at home since that time and \"I just realized today I can't really move it\". Pt did not strike her head, no LOC. No neck or back pain. Pt denied h/o problems with her shoulder; however, chart review notes decreased range in the L shoulder, h/o OA, and history of chronic pain. She has an appt with her PCP for Wednesday. Pt is o/w healthy without fever, chills, cough, congestion, ST, shortness of breath, chest pain, N/V/D. Chief Complaint: R shoulder pain  Duration: 5 Days  Timing:  Acute  Location: R shoulder  Quality: Aching  Severity: Moderate  Modifying Factors: pain worsens with any attempt to move the arm/shoulder  Associated Symptoms: denies any other associated signs or symptoms      There are no other complaints, changes, or physical findings at this time. PCP: Ya Murray MD    Current Outpatient Prescriptions   Medication Sig Dispense Refill    methocarbamol (ROBAXIN-750) 750 mg tablet Take 1 Tab by mouth three (3) times daily for 5 days. 15 Tab 0    lidocaine (LIDODERM) 5 % Apply patch to the affected area for 12 hours a day and remove for 12 hours a day.  5 Each 0    cholecalciferol, vitamin D3, (VITAMIN D3) 2,000 unit tab Take  by mouth.  Insulin Syringe-Needle, Dis Un 0.3 mL 30 gauge x 5/16\" syrg Use to inject insulin twice a day for type 1 diabetes 200 Syringe 3    insulin NPH/insulin regular (NOVOLIN 70/30 U-100 INSULIN) 100 unit/mL (70-30) injection by SubCUTAneous route. Inject 26 units am   16 units (non work day)pm or  8 units (work day)pm  Take 1/2 doses if BS less than or greater than 100  No doses if BS less than or greater than 70  (drink orange juice)      DULoxetine (CYMBALTA) 30 mg capsule Take 2 Caps by mouth daily. For pain and depression. 60 Cap 2    meloxicam (MOBIC) 15 mg tablet Take 1 Tab by mouth daily. Take with food. Indications: OSTEOARTHRITIS 90 Tab 1    rosuvastatin (CRESTOR) 20 mg tablet Take 1 Tab by mouth nightly. Indications: hyperlipidemia 90 Tab 3    ergocalciferol (ERGOCALCIFEROL) 50,000 unit capsule Take 1 Cap by mouth every seven (7) days. 12 Cap 0    ACCU-CHEK SOFTCLIX LANCETS misc Use to test blood sugar 3 times per day for Dx: E11.9 400 Each 3    ACCU-CHEK CAROL PLUS TEST STRP strip Testing blood sugar 3 times per day Dx E11.9 400 Strip 3    ZYRTEC 10 mg cap       losartan (COZAAR) 50 mg tablet TAKE 1 TABLET EVERY DAY  Indications: hypertension 90 Tab 3    Insulin Syringe-Needle U-100 (BD LO-DOSE MICRO-FINE IV) 1/2 mL 28 gauge x 1/2\" syrg Use to inject insulin twice daily dx 200 Syringe 3    ACCU-CHEK CAROL PLUS METER misc       SYRINGE-NEEDLE,INSULIN,0.5 ML (B-D INSULIN SYRINGE LO-DOSE) by Does Not Apply route. Use to inject insulin twice a day for      aspirin (ASPIRIN) 325 mg tablet Take 1 Tab by mouth daily.  30 Tab 5       Past History     Past Medical History:  Past Medical History:   Diagnosis Date    Arthritis     Diabetes ([address]     Hypercholesterolemia     Hypertension        Past Surgical History:  Past Surgical History:   Procedure Laterality Date    EYE SURG ANT SGMT PROC UNLISTED      HX TUBAL LIGATION Family History:  Family History   Problem Relation Age of Onset   [facility] Cancer Mother     Diabetes Sister     Heart Disease Sister     Diabetes Brother     Heart Disease Brother     Diabetes Maternal Grandmother     Diabetes Paternal Grandmother        Social History:  Social History   Substance Use Topics    Smoking status: Never Smoker    Smokeless tobacco: Never Used    Alcohol use No       Allergies: Allergies   Allergen Reactions    Compazine [Prochlorperazine Edisylate] Other (comments)     Tongue swelling, drooling    Naprosyn [Naproxen] Other (comments)     Belching,gas,stomach pain    Percocet [Oxycodone-Acetaminophen] Other (comments)     Visual problems/seeings in triplicate per patient         Review of Systems   Review of Systems   Constitutional: Negative for chills and fever. HENT: Negative for congestion, rhinorrhea and sore throat. Eyes: Negative for photophobia and visual disturbance. Respiratory: Negative for cough and shortness of breath. Cardiovascular: Negative for chest pain and palpitations. Gastrointestinal: Negative for abdominal pain, diarrhea, nausea and vomiting. Genitourinary: Negative for dysuria and hematuria. Musculoskeletal: Positive for arthralgias. Negative for neck pain and neck stiffness. Skin: Negative for rash and wound. Allergic/Immunologic: Negative for food allergies and immunocompromised state. Neurological: Negative for dizziness, weakness, numbness and headaches. Psychiatric/Behavioral: Negative for agitation and confusion. The patient is nervous/anxious. Physical Exam   Physical Exam   Constitutional: She is oriented to person, place, and time. She appears well-developed and well-nourished. No distress. WDWN elderly female, alert, in NAD   HENT:   Head: Normocephalic and atraumatic. Nose: Nose normal.   Mouth/Throat: Oropharynx is clear and moist. No oropharyngeal exudate.    Eyes: Conjunctivae and EOM are normal. Pupils are equal, round, and reactive to light. Right eye exhibits no discharge. Left eye exhibits no discharge. No scleral icterus. Neck: Normal range of motion. Neck supple. No JVD present. No tracheal deviation present. No thyromegaly present. No cervical spinal tenderness   Cardiovascular: Normal rate, regular rhythm and normal heart sounds. Pulmonary/Chest: Effort normal and breath sounds normal. No respiratory distress. She has no wheezes. Abdominal: Soft. There is no tenderness. Musculoskeletal: She exhibits tenderness. She exhibits no edema. Decreased A/P ROM to R shoulder due to tenderness, no deformity, no crepitus, no erythema/heat. No evidence of abrasion/ecchymosis. 2+ radial pulse, NVI, sensation grossly intact to light touch. Wrist and elbow with full ROM without tenderness. Lymphadenopathy:     She has no cervical adenopathy. Neurological: She is alert and oriented to person, place, and time. She exhibits normal muscle tone. Coordination normal.   Skin: Skin is warm and dry. No rash noted. She is not diaphoretic. No erythema. Psychiatric: She has a normal mood and affect. Her behavior is normal. Judgment normal.   Nursing note and vitals reviewed. Diagnostic Study Results     Labs -   No results found for this or any previous visit (from the past 12 hour(s)). Radiologic Studies -   XR SHOULDER RT AP/LAT MIN 2 V   Final Result           Kaitlynn Branch #[id number]  (67 y.o. F) 03             Lab Results      None       Imaging Results           XR SHOULDER RT AP/LAT MIN 2 V (Final result) Result time: 09/15/18 17:34:49     Final result by Jey Munoz Results In (09/15/18 17:33:55)     Initial Result:     Impression:     IMPRESSION:  No acute abnormality.       Narrative:     EXAM:  XR SHOULDER RT AP/LAT MIN 2 V    INDICATION: Right shoulder pain after trauma     COMPARISON: 9/11/2018.     FINDINGS: Three views of the right shoulder demonstrate no fracture, dislocation  or other acute abnormality.              ECG Results      None             Medical Decision Making   I am the first provider for this patient. I reviewed the vital signs, available nursing notes, past medical history, past surgical history, family history and social history. Vital Signs-Reviewed the patient's vital signs. Patient Vitals for the past 12 hrs:   Temp Pulse Resp BP SpO2   09/15/18 1519 98.3 °F (36.8 °C) 89 16 (!) 126/96 96 %       Records Reviewed: Nursing Notes, Old Medical Records, Previous Radiology Studies and Previous Laboratory Studies    Provider Notes (Medical Decision Making):   Strain, fx, spasm, rotator cuff injury, exac of chronic pain    ED Course:   Initial assessment performed. The patients presenting problems have been discussed, and they are in agreement with the care plan formulated and outlined with them. I have encouraged them to ask questions as they arise throughout their visit. DISCHARGE NOTE:  7:35 PM  The care plan has been outline with the patient and/or family, who verbally conveyed understanding and agreement. Available results have been reviewed. Patient and/or family understand the follow up plan as outlined and discharge instructions. Should their condition deterioration at any time after discharge the patient agrees to return, follow up sooner than outlined or seek medical assistance at the closest Emergency Room as soon as possible. Questions have been answered. Discharge instructions and educational information regarding the patient's diagnosis as well a list of reasons why the patient would want to seek immediate medical attention, should their condition change, were reviewed directly with the patient/family     PLAN:  1. Discharge Medication List as of 9/15/2018  5:39 PM      START taking these medications    Details   methocarbamol (ROBAXIN-750) 750 mg tablet Take 1 Tab by mouth three (3) times daily for 5 days. , Print, Disp-15 Tab, R-0      lidocaine (LIDODERM) 5 % Apply patch to the affected area for 12 hours a day and remove for 12 hours a day., Print, Disp-5 Each, R-0         CONTINUE these medications which have NOT CHANGED    Details   cholecalciferol, vitamin D3, (VITAMIN D3) 2,000 unit tab Take  by mouth., Historical Med      Insulin Syringe-Needle, Dis Un 0.3 mL 30 gauge x 5/16\" syrg Use to inject insulin twice a day for type 1 diabetes, Normal, Disp-200 Syringe, R-3      insulin NPH/insulin regular (NOVOLIN 70/30 U-100 INSULIN) 100 unit/mL (70-30) injection by SubCUTAneous route. Inject 26 units am   16 units (non work day)pm or  8 units (work day)pm  Take 1/2 doses if BS less than or greater than 100  No doses if BS less than or greater than 70  (drink orange juice), Historical Med      DULoxetine (CYMBALTA) 30 mg capsule Take 2 Caps by mouth daily. For pain and depression., Normal, Disp-60 Cap, R-2      meloxicam (MOBIC) 15 mg tablet Take 1 Tab by mouth daily. Take with food. Indications: OSTEOARTHRITIS, Normal, Disp-90 Tab, R-1      rosuvastatin (CRESTOR) 20 mg tablet Take 1 Tab by mouth nightly. Indications: hyperlipidemia, Normal, Disp-90 Tab, R-3      ergocalciferol (ERGOCALCIFEROL) 50,000 unit capsule Take 1 Cap by mouth every seven (7) days. , Normal, Disp-12 Cap, R-0      ACCU-CHEK SOFTCLIX LANCETS [facility] Use to test blood sugar 3 times per day for Dx: E11.9, Normal, Disp-400 Each, R-3, JOSEFINA      ACCU-CHEK CAROL PLUS TEST STRP strip Testing blood sugar 3 times per day Dx E11.9, Normal, Disp-400 Strip, R-3, JOSEFINA      ZYRTEC 10 mg cap Historical Med, JOSEFINA      losartan (COZAAR) 50 mg tablet TAKE 1 TABLET EVERY DAY  Indications: hypertension, Normal, Disp-90 Tab, R-3      !!  Insulin Syringe-Needle U-100 (BD LO-DOSE MICRO-FINE IV) 1/2 mL 28 gauge x 1/2\" syrg Use to inject insulin twice daily dx, Normal, Disp-200 Syringe, R-3      ACCU-CHEK CAROL PLUS METER misc Historical Med, JOSEFINA      !! SYRINGE-NEEDLE,INSULIN,0.5 ML (B-D INSULIN SYRINGE LO-DOSE) by Does Not Apply route. Use to inject insulin twice a day for, Historical Med      aspirin (ASPIRIN) 325 mg tablet Take 1 Tab by mouth daily. , Normal, Disp-30 Tab, R-5       !! - Potential duplicate medications found. Please discuss with provider. 2.   Follow-up Information     Follow up With Details Comments [id number] MD Josh Alfred [phone] [id number]  [address]  [address] [facility]  [facility].  [phone]      [facility] EMERGENCY DEPT  If symptoms worsen [address] [facility]  [address] [facility]  [phone]        Return to ED if worse     Diagnosis     Clinical Impression:   1. Right arm pain    2. Chronic pain syndrome    3. Decreased range of motion of right shoulder    4.  Fall, subsequent encounter

## 2018-09-15 NOTE — Clinical Note
Rest, alternate ice/moist heat, gentle stretching. Follow up with [facility] comp physician or shoulder specialist for recheck.

## 2018-09-19 ENCOUNTER — OFFICE VISIT (OUTPATIENT)
Dept: INTERNAL MEDICINE CLINIC | Facility: CLINIC | Age: 68
End: 2018-09-19

## 2018-09-19 VITALS
OXYGEN SATURATION: 97 % | SYSTOLIC BLOOD PRESSURE: 111 MMHG | RESPIRATION RATE: 16 BRPM | WEIGHT: 129 LBS | BODY MASS INDEX: 27.08 KG/M2 | TEMPERATURE: 98.4 F | DIASTOLIC BLOOD PRESSURE: 73 MMHG | HEART RATE: 81 BPM | HEIGHT: 58 IN

## 2018-09-19 DIAGNOSIS — I10 ESSENTIAL HYPERTENSION: Primary | ICD-10-CM

## 2018-09-19 DIAGNOSIS — M85.89 OSTEOPENIA OF MULTIPLE SITES: ICD-10-CM

## 2018-09-19 DIAGNOSIS — E78.2 MIXED HYPERLIPIDEMIA: ICD-10-CM

## 2018-09-19 DIAGNOSIS — E55.9 VITAMIN D DEFICIENCY: ICD-10-CM

## 2018-09-19 DIAGNOSIS — E10.9 TYPE 1 DIABETES MELLITUS WITHOUT COMPLICATION (HCC): ICD-10-CM

## 2018-09-19 DIAGNOSIS — M25.511 ACUTE PAIN OF RIGHT SHOULDER: ICD-10-CM

## 2018-09-19 DIAGNOSIS — M15.9 PRIMARY OSTEOARTHRITIS INVOLVING MULTIPLE JOINTS: ICD-10-CM

## 2018-09-19 DIAGNOSIS — Z12.11 SCREENING FOR COLON CANCER: ICD-10-CM

## 2018-09-19 DIAGNOSIS — G89.4 CHRONIC PAIN SYNDROME: ICD-10-CM

## 2018-09-19 LAB — HBA1C MFR BLD HPLC: 8.3 %

## 2018-09-19 NOTE — MR AVS SNAPSHOT
[address] [address] [phone] Patient: Ly Weston MRN: [id number] JEANNETTE:[id number] Visit Information Date & Time Provider Department Dept. Phone Encounter #  
 9/19/2018  9:30 AM Mary Pires MD [facility] Internal Medicine [facility] [phone] [id number] Follow-up Instructions Return in about 3 months (around 12/19/2018), or if symptoms worsen or fail to improve, for HTN, OA. Your Appointments 10/24/2018  9:50 AM  
Follow Up with Cele Schmitz MD  
[facility] [address] [facility]) Appt Note: f/u      dm                3 month  
 [address] [facility] [address] [id number] [address] [place] [address] Upcoming Health Maintenance Date Due FOBT Q 1 YEAR AGE 50-75 7/6/2018 EYE EXAM RETINAL OR DILATED Q1 9/13/2018 MICROALBUMIN Q1 9/27/2018 Influenza Age 5 to Adult 9/1/2019* Pneumococcal 65+ Low/Medium Risk (2 of 2 - PPSV23) 9/27/2018 HEMOGLOBIN A1C Q6M 11/23/2018 LIPID PANEL Q1 2/21/2019 MEDICARE YEARLY EXAM 5/24/2019 FOOT EXAM Q1 7/18/2019 BREAST CANCER SCRN MAMMOGRAM 8/2/2019 GLAUCOMA SCREENING Q2Y 9/13/2019 DTaP/Tdap/Td series (2 - Td) 4/27/2027 *Topic was postponed. The date shown is not the original due date. Allergies as of 9/19/2018  Review Complete On: 9/19/2018 By: Mary Pires MD  
  
 Severity Noted Reaction Type Reactions Compazine [Prochlorperazine Edisylate]  04/12/2010    Other (comments) Tongue swelling, drooling Naprosyn [Naproxen]  05/19/2017   Side Effect Other (comments) Belching,gas,stomach pain Percocet [Oxycodone-acetaminophen]  01/18/2018    Other (comments) Visual problems/seeings in triplicate per patient Current Immunizations  Reviewed on 11/1/2017  Name Date  
 TD Vaccine 4/13/2010  2:21 AM  
  
 Not reviewed this visit You Were Diagnosed With   
  
 Codes Comments Essential hypertension    -  Primary ICD-10-CM: I10 
ICD-9-CM: 401.9 Type 1 diabetes mellitus without complication (HCC)     KFV-78-DF: E10.9 ICD-9-CM: 250.01 Acute pain of right shoulder     ICD-10-CM: M25.511 ICD-9-CM: 719.41 Mixed hyperlipidemia     ICD-10-CM: E78.2 ICD-9-CM: 272.2 Primary osteoarthritis involving multiple joints     ICD-10-CM: M15.0 ICD-9-CM: 715.09 Chronic pain syndrome     ICD-10-CM: G89.4 ICD-9-CM: 338.4 Osteopenia of multiple sites     ICD-10-CM: M85.89 ICD-9-CM: 733.90 Vitamin D deficiency     ICD-10-CM: E55.9 ICD-9-CM: 268.9 Screening for colon cancer     ICD-10-CM: Z12.11 ICD-9-CM: V76.51 Vitals BP Pulse Temp Resp Height(growth percentile) Weight(growth percentile) 111/73 (BP 1 Location: Left arm, BP Patient Position: Sitting) 81 98.4 °F (36.9 °C) (Oral) 16 4' 10\" (1.473 m) 129 lb (58.5 kg) SpO2 BMI OB Status Smoking Status 97% 26.96 kg/m2 Postmenopausal Never Smoker BMI and BSA Data Body Mass Index Body Surface Area  
 26.96 kg/m 2 1.55 m 2 Preferred Pharmacy Pharmacy Name Phone [id number] [address] [phone] Your Updated Medication List  
  
   
This list is accurate as of 9/19/18 10:22 AM.  Always use your most recent med list.  
  
  
  
  
 [address] Generic drug:  Blood-Glucose Meter ACCU-CHEK CAROL PLUS TEST STRP strip Generic drug:  glucose blood VI test strips Testing blood sugar 3 times per day Dx E11.9 [address] Generic drug:  Lancets Use to test blood sugar 3 times per day for Dx: E11.9  
  
 aspirin 325 mg tablet Commonly known as:  ASPIRIN Take 1 Tab by mouth daily. * B-D INSULIN SYRINGE LO-DOSE  
by Does Not Apply route. Use to inject insulin twice a day for * Insulin Syringe-Needle U-100 1/2 mL 28 gauge x 1/2\" Syrg Commonly known as:  BD LO-DOSE MICRO-FINE IV Use to inject insulin twice daily dx DULoxetine 30 mg capsule Commonly known as:  CYMBALTA Take 2 Caps by mouth daily. For pain and depression. Insulin Syringe-Needle, Dis Un 0.3 mL 30 gauge x 5/16\" Syrg Use to inject insulin twice a day for type 1 diabetes  
  
 lidocaine 5 % Commonly known as:  Minor Alt Apply patch to the affected area for 12 hours a day and remove for 12 hours a day. losartan 50 mg tablet Commonly known as:  COZAAR  
TAKE 1 TABLET EVERY DAY  Indications: hypertension  
  
 methocarbamol 750 mg tablet Commonly known as:  [id number] Take 1 Tab by mouth three (3) times daily for 5 days. NovoLIN 70/30 U-100 Insulin 100 unit/mL (70-30) injection Generic drug:  insulin NPH/insulin regular  
by SubCUTAneous route. Inject 26 units am  16 units (non work day)pm or 8 units (work day)pm Take 1/2 doses if BS less than or greater than 100 No doses if BS less than or greater than 70 (drink orange juice)  
  
 rosuvastatin 20 mg tablet Commonly known as:  CRESTOR Take 1 Tab by mouth nightly. Indications: hyperlipidemia SALINE NASAL NA  
by Nasal route. VITAMIN D3 2,000 unit Tab Generic drug:  cholecalciferol (vitamin D3) Take 1,000 Units by mouth. * Notice: This list has 2 medication(s) that are the same as other medications prescribed for you. Read the directions carefully, and ask your doctor or other care provider to review them with you. We Performed the Following AMB POC HEMOGLOBIN A1C [55335 CPT(R)] HEMOGLOBIN A1C WITH EAG [00866 CPT(R)] LIPID PANEL [61926 CPT(R)] METABOLIC PANEL, COMPREHENSIVE [34760 CPT(R)] MICROALBUMIN, UR, RAND W/ MICROALB/CREAT RATIO U851177 CPT(R)] REFERRAL TO OPHTHALMOLOGY [REF57 Custom] Comments:  
 Dilated diabetic eye exam.  
 VITAMIN D, 25 HYDROXY [47423 CPT(R)] Follow-up Instructions Return in about 3 months (around 12/19/2018), or if symptoms worsen or fail to improve, for HTN, OA. To-Do List   
 09/19/2018 Lab:  OCCULT BLOOD IMMUNOASSAY,DIAGNOSTIC   
  
 10/10/2018 10:00 AM  
  Appointment with [facility] 4 at [address] ([phone]) Shower or bathe using soap and water. Do not use deodorant, powder, perfumes, or lotion the day of your exam.  If your prior mammograms were not performed at [facility] 6 please bring films with you or forward prior images 2 days before your procedure. Check in at registration 15min before your appointment time unless you were instructed to do otherwise. A script is not necessary, but if you have one, please bring it on the day of the mammogram or have it faxed to the department. You are responsible for finding a method of transportation to your appointment. If you don't have transportation, please reschedule your appointment at least 24 hours in advance. [facility] [phone] [facility]  [phone] [facility] 19 [facility]  [phone] [facility] [phone] [address] [phone] Referral Information Referral ID Referred By Referred To  
  
 [id number] Phone: [phone] Fax: [phone] Visits Status Start Date End Date 1 New Request 9/19/18 9/19/19 If your referral has a status of pending review or denied, additional information will be sent to support the outcome of this decision. Patient Instructions Preventing Falls: Care Instructions Your Care Instructions Getting around your home safely can be a challenge if you have injuries or health problems that make it easy for you to fall. Loose rugs and furniture in walkways are among the dangers for many older people who have problems walking or who have poor eyesight.  People who have conditions such as arthritis, osteoporosis, or dementia also have to be careful not to fall. You can make your home safer with a few simple measures. Follow-up care is a key part of your treatment and safety. Be sure to make and go to all appointments, and call your doctor if you are having problems. It's also a good idea to know your test results and keep a list of the medicines you take. How can you care for yourself at home? Taking care of yourself · You may get dizzy if you do not drink enough water. To prevent dehydration, drink plenty of fluids, enough so that your urine is light yellow or clear like water. Choose water and other caffeine-free clear liquids. If you have kidney, heart, or liver disease and have to limit fluids, talk with your doctor before you increase the amount of fluids you drink. · Exercise regularly to improve your strength, muscle tone, and balance. Walk if you can. Swimming may be a good choice if you cannot walk easily. · Have your vision and hearing checked each year or any time you notice a change. If you have trouble seeing and hearing, you might not be able to avoid objects and could lose your balance. · Know the side effects of the medicines you take. Ask your doctor or pharmacist whether the medicines you take can affect your balance. Sleeping pills or sedatives can affect your balance. · Limit the amount of alcohol you drink. Alcohol can impair your balance and other senses. · Ask your doctor whether calluses or corns on your feet need to be removed. If you wear loose-fitting shoes because of calluses or corns, you can lose your balance and fall. · Talk to your doctor if you have numbness in your feet. Preventing falls at home · Remove raised doorway thresholds, throw rugs, and clutter. Repair loose carpet or raised areas in the floor. · Move furniture and electrical cords to keep them out of walking paths.  
· Use nonskid floor wax, and wipe up spills right away, especially on ceramic tile floors. · If you use a walker or cane, put rubber tips on it. If you use crutches, clean the bottoms of them regularly with an abrasive pad, such as steel wool. · Keep your house well lit, especially [place], and outside walkways. Use night-lights in areas such as hallways and bathrooms. Add extra light switches or use remote switches (such as switches that go on or off when you clap your hands) to make it easier to turn lights on if you have to get up during the night. · Install sturdy handrails on stairways. · Move items in your cabinets so that the things you use a lot are on the lower shelves (about waist level). · Keep a cordless phone and a flashlight with new batteries by your bed. If possible, put a phone in each of the main rooms of your house, or carry a cell phone in case you fall and cannot reach a phone. Or, you can wear a device around your neck or wrist. You push a button that sends a signal for help. · Wear low-heeled shoes that fit well and give your feet good support. Use footwear with nonskid soles. Check the heels and soles of your shoes for wear. Repair or replace worn heels or soles. · Do not wear socks without shoes on wood floors. · Walk on the grass when the sidewalks are slippery. If you live in an area that gets snow and ice in the winter, sprinkle salt on slippery steps and sidewalks. Preventing falls in the bath · Install grab bars and nonskid mats inside and outside your shower or tub and near the toilet and sinks. · Use shower chairs and bath benches. · Use a hand-held shower head that will allow you to sit while showering. · Get into a tub or shower by putting the weaker leg in first. Get out of a tub or shower with your strong side first. 
· Repair loose toilet seats and consider installing a raised toilet seat to make getting on and off the toilet easier. · Keep your bathroom door unlocked while you are in the shower. Where can you learn more? Go to http://sammi-amanda.info/. Enter [phone] in the search box to learn more about \"Preventing Falls: Care Instructions. \" Current as of: May 12, 2017 Content Version: 11.7 © 3385-3851 [employer]. Care instructions adapted under license by [employer] (which disclaims liability or warranty for this information). If you have questions about a medical condition or this instruction, always ask your healthcare professional. [address] [address] any warranty or liability for your use of this information. Please provide this summary of care documentation to your next provider. Your primary care clinician is listed as Dominga Hill. If you have any questions after today's visit, please call [phone].

## 2018-09-19 NOTE — PROGRESS NOTES
CC:   Chief Complaint   Patient presents with    Pain (Chronic)    Hypertension    Diabetes    Fall     2 recent falls       HISTORY OF PRESENT ILLNESS  Lydia Martinez is a 78 y.o. female. She has type 1 DM, HTN, hyperlipidemia, chronic pain syndrome, and seasonal allergic rhinitis. Reports she had a fall in July. Today she complains of right shoulder stiffness. Was seen at [id number] [place] [address] ED twice recently after having a fall on 9/10/18. Seen on  9/11/18 after having right arm pain from shoulder down after falling at work the night before. X-rays of right wrist, shoulder, and humerus showed no fractures. Was discharged on no new medications. Discharge instructions: rest, alternate ice/moist heat, gentle stretching. Seen on 9/15/18 for the same, stating that her right arm pain was not getting better. Repeat right shoulder X-ray showed no acute changes. Was discharged on Robaxin and lidocaine patches and instructed to follow up with [facility] Compensation physician or shoulder specialist for recheck. Endocrine Review  She is seen for diabetes. Follows with Dr. Bret Cox. Denies polydipsia, polyuria. She is taking her medications as instructed. Home glucose monitoring: is performed regularly.  Brought in home BS record; still having low BS's at 54 to 59 the days. She reports medication compliance: compliant all of the time.  Medication side effects: none.  Diabetic diet compliance: compliant most of the time.  Lab review: A1c today is 8.3% (was 8.4% on 5/23/18).   Eye exam: UTD.       Cardiovascular Review  The patient has hypertension and hyperlipidemia.  She reports taking medications as instructed, no medication side effects noted.  Diet and Lifestyle: generally follows a low fat low cholesterol diet, generally follows a low sodium diet, no formal exercise but active during the day.  Lab review: orders written for new lab studies as appropriate; see orders.        Soc Hx  .  Has 1 son; no grandchildren. Lives with her son in a trailer home. Works at [employer]. Usually works on Wednesdays, Saturdays, and Sundays. Does not drive; walks to work. Never smoker. Denies alcohol or recreational drug use. Drinks a cup of coffee twice daily.      ROS  A complete review of systems was performed and is negative except for those mentioned in the HPI. Patient Active Problem List   Diagnosis Code    Type 1 diabetes mellitus without complication ([facility] F42.2    Essential hypertension I10    Mixed hyperlipidemia E78.2    Allergic rhinitis J30.9    Vitamin D deficiency E55.9    Chronic left shoulder pain M25.512, G89.29    Primary osteoarthritis involving multiple joints M15.0    Obesity (BMI 30-39. 9) E66.9    Osteopenia M85.80    Depressed mood F32.9    Chronic pain syndrome G89.4     Past Medical History:   Diagnosis Date    Arthritis     Diabetes ([facility]     Hypercholesterolemia     Hypertension      Allergies   Allergen Reactions    Compazine [Prochlorperazine Edisylate] Other (comments)     Tongue swelling, drooling    Naprosyn [Naproxen] Other (comments)     Belching,gas,stomach pain    Percocet [Oxycodone-Acetaminophen] Other (comments)     Visual problems/seeings in triplicate per patient       Current Outpatient Prescriptions   Medication Sig Dispense Refill    sodium chloride (SALINE NASAL NA) by Nasal route.  methocarbamol (ROBAXIN-750) 750 mg tablet Take 1 Tab by mouth three (3) times daily for 5 days. 15 Tab 0    lidocaine (LIDODERM) 5 % Apply patch to the affected area for 12 hours a day and remove for 12 hours a day. 5 Each 0    cholecalciferol, vitamin D3, (VITAMIN D3) 2,000 unit tab Take 1,000 Units by mouth.  Insulin Syringe-Needle, Dis Un 0.3 mL 30 gauge x 5/16\" syrg Use to inject insulin twice a day for type 1 diabetes 200 Syringe 3    insulin NPH/insulin regular (NOVOLIN 70/30 U-100 INSULIN) 100 unit/mL (70-30) injection by SubCUTAneous route.  Inject 26 units am   16 units (non work day)pm or  8 units (work day)pm  Take 1/2 doses if BS less than or greater than 100  No doses if BS less than or greater than 70  (drink orange juice)      DULoxetine (CYMBALTA) 30 mg capsule Take 2 Caps by mouth daily. For pain and depression. 60 Cap 2    rosuvastatin (CRESTOR) 20 mg tablet Take 1 Tab by mouth nightly. Indications: hyperlipidemia 90 Tab 3    ACCU-CHEK SOFTCLIX LANCETS misc Use to test blood sugar 3 times per day for Dx: E11.9 400 Each 3    ACCU-CHEK CAROL PLUS TEST STRP strip Testing blood sugar 3 times per day Dx E11.9 400 Strip 3    losartan (COZAAR) 50 mg tablet TAKE 1 TABLET EVERY DAY  Indications: hypertension 90 Tab 3    Insulin Syringe-Needle U-100 (BD LO-DOSE MICRO-FINE IV) 1/2 mL 28 gauge x 1/2\" syrg Use to inject insulin twice daily dx 200 Syringe 3    ACCU-CHEK CAROL PLUS METER misc       SYRINGE-NEEDLE,INSULIN,0.5 ML (B-D INSULIN SYRINGE LO-DOSE) by Does Not Apply route. Use to inject insulin twice a day for      aspirin (ASPIRIN) 325 mg tablet Take 1 Tab by mouth daily. 30 Tab 5         PHYSICAL EXAM  Visit Vitals    /73 (BP 1 Location: Left arm, BP Patient Position: Sitting)    Pulse 81    Temp 98.4 °F (36.9 °C) (Oral)    Resp 16    Ht 4' 10\" (1.473 m)    Wt 129 lb (58.5 kg)    SpO2 97%    BMI 26.96 kg/m2       General: Well-developed and well-nourished, no distress. HEENT:  Head normocephalic/atraumatic, no scleral icterus  Lungs:  Clear to ausculation bilaterally. Good air movement. Heart:  Regular rate and rhythm, normal S1 and S2, no murmur, gallop, or rub  Extremities: No clubbing, cyanosis, or edema. No tenderness to palpation at right shoulder. Passive ROM to 90 degrees with flexion at right shoulder. Normal ROM at left shoulder. Neurological: Alert and oriented. Psychiatric: Normal mood and affect.  Behavior is normal.     Results for orders placed or performed in visit on 09/19/18   AMB POC HEMOGLOBIN A1C   Result Value Ref Range    Hemoglobin A1c (POC) 8.3 %         ASSESSMENT AND PLAN    ICD-10-CM ICD-9-CM    1. Essential hypertension S40 086.4 METABOLIC PANEL, COMPREHENSIVE   2. Type 1 diabetes mellitus without complication ([facility]) X10.2 250.01 AMB POC HEMOGLOBIN A1C      REFERRAL TO OPHTHALMOLOGY      MICROALBUMIN, UR, RAND W/ MICROALB/CREAT RATIO      HEMOGLOBIN A1C WITH EAG   3. Acute pain of right shoulder M25.511 719.41    4. Mixed hyperlipidemia E78.2 272.2 LIPID PANEL   5. Primary osteoarthritis involving multiple joints M15.0 715.09    6. Chronic pain syndrome G89.4 338.4    7. Osteopenia of multiple sites M85.89 733.90    8. Vitamin D deficiency E55.9 268.9 VITAMIN D, 25 HYDROXY   9. Screening for colon cancer Z12.11 V76.51 OCCULT BLOOD IMMUNOASSAY,DIAGNOSTIC     She wants to have labs ordered by Dr. Sonu Brandon done in clinic. Diagnoses and all orders for this visit:    1. Essential hypertension  Controlled. Continue losartan 50 mg daily.  -     METABOLIC PANEL, COMPREHENSIVE    2. Type 1 diabetes mellitus without complication ([facility]  Continue following instructions given by Dr. Sonu Brandon. Follow up on 10/24/18 as instructed. -     AMB POC HEMOGLOBIN A1C  -     REFERRAL TO OPHTHALMOLOGY  -     MICROALBUMIN, UR, RAND W/ MICROALB/CREAT RATIO  -     HEMOGLOBIN A1C WITH EAG    3. Acute pain of right shoulder  To see orthopedic specialist tomorrow as part of Workman's Compensation case. 4. Mixed hyperlipidemia  -     LIPID PANEL    5. Primary osteoarthritis involving multiple joints    6. Chronic pain syndrome    7. Osteopenia of multiple sites    8. Vitamin D deficiency  -     VITAMIN D, 25 HYDROXY    9. Screening for colon cancer  -     OCCULT BLOOD IMMUNOASSAY,DIAGNOSTIC; Future    She declined the flu vaccine. Follow-up Disposition:  Return in about 3 months (around 12/19/2018), or if symptoms worsen or fail to improve, for HTN, OA.       I have discussed the diagnosis with the patient and the intended plan as seen in the above orders. Patient is in agreement. The patient has received an after-visit summary and questions were answered concerning future plans. I have discussed medication side effects and warnings with the patient as well.

## 2018-09-19 NOTE — PATIENT INSTRUCTIONS

## 2018-09-19 NOTE — PROGRESS NOTES
Lola Weston  Identified pt with two pt identifiers(name and ). Chief Complaint   Patient presents with    Pain (Chronic)    Hypertension    Diabetes    Immunization/Injection       1. Have you been to the ER, urgent care clinic since your last visit? Hospitalized since your last visit? [address]    2. Have you seen or consulted any other health care providers outside of the [address] [place] [address] since your last visit? Include any pap smears or colon screening. Dr Kofi Farah  Per Dr. Jaqueline Santana verbal order read back orders placed for poc A1C. Today's provider has been notified of reason for visit, vitals and flowsheets obtained on patients. Patient received paperwork for advance directive during previous visit but has not completed at this time     Reviewed record In preparation for visit, huddled with provider and have obtained necessary documentation      Health Maintenance Due   Topic    FOBT Q 1 YEAR AGE 50-75     Influenza Age 5 to Adult     EYE EXAM RETINAL OR DILATED Q1     MICROALBUMIN Q1        Wt Readings from Last 3 Encounters:   09/15/18 133 lb 6.1 oz (60.5 kg)   18 133 lb 6.1 oz (60.5 kg)   18 132 lb 8 oz (60.1 kg)     Temp Readings from Last 3 Encounters:   09/15/18 98.3 °F (36.8 °C)   18 98.1 °F (36.7 °C)   18 98.4 °F (36.9 °C) (Oral)     BP Readings from Last 3 Encounters:   09/15/18 (!) 126/96   18 133/81   18 153/69     Pulse Readings from Last 3 Encounters:   09/15/18 89   18 78   18 61     There were no vitals filed for this visit.       Learning Assessment:  :     Learning Assessment 2017   PRIMARY LEARNER Patient   PRIMARY LANGUAGE ENGLISH   LEARNER PREFERENCE PRIMARY READING   ANSWERED BY patient   RELATIONSHIP SELF       Depression Screening:  :     PHQ over the last two weeks 2017   Little interest or pleasure in doing things Not at all   Feeling down, depressed, irritable, or hopeless Several days   Total Score PHQ 2 1 Fall Risk Assessment:  :     Fall Risk Assessment, last 12 mths 5/23/2018   Able to walk? Yes   Fall in past 12 months? No   Fall with injury? -   Number of falls in past 12 months -   Fall Risk Score -       Abuse Screening:  :     Abuse Screening Questionnaire 4/28/2017   Do you ever feel afraid of your partner? N   Are you in a relationship with someone who physically or mentally threatens you? N   Is it safe for you to go home? Y       ADL Screening:  :     ADL Assessment 5/23/2018   Feeding yourself No Help Needed   Getting from bed to chair No Help Needed   Getting dressed No Help Needed   Bathing or showering No Help Needed   Walk across the room (includes cane/walker) No Help Needed   Using the telphone No Help Needed   Taking your medications No Help Needed   Preparing meals No Help Needed   Managing money (expenses/bills) No Help Needed   Moderately strenuous housework (laundry) No Help Needed   Shopping for personal items (toiletries/medicines) No Help Needed   Shopping for groceries No Help Needed   Driving Help Needed   Climbing a flight of stairs No Help Needed   Getting to places beyond walking distances No Help Needed                 Medication reconciliation up to date and corrected with patient at this time.

## 2018-09-21 LAB
25(OH)D3+25(OH)D2 SERPL-MCNC: 37.6 NG/ML (ref 30–100)
ALBUMIN SERPL-MCNC: 4.8 G/DL (ref 3.6–4.8)
ALBUMIN/CREAT UR: 68.8 MG/G CREAT (ref 0–30)
ALBUMIN/GLOB SERPL: 2.7 {RATIO} (ref 1.2–2.2)
ALP SERPL-CCNC: 78 IU/L (ref 39–117)
ALT SERPL-CCNC: 18 IU/L (ref 0–32)
AST SERPL-CCNC: 24 IU/L (ref 0–40)
BILIRUB SERPL-MCNC: 0.5 MG/DL (ref 0–1.2)
BUN SERPL-MCNC: 16 MG/DL (ref 8–27)
BUN/CREAT SERPL: 21 (ref 12–28)
CALCIUM SERPL-MCNC: 9.9 MG/DL (ref 8.7–10.3)
CHLORIDE SERPL-SCNC: 99 MMOL/L (ref 96–106)
CHOLEST SERPL-MCNC: 140 MG/DL (ref 100–199)
CO2 SERPL-SCNC: 28 MMOL/L (ref 20–29)
CREAT SERPL-MCNC: 0.78 MG/DL (ref 0.57–1)
CREAT UR-MCNC: 197.2 MG/DL
EST. AVERAGE GLUCOSE BLD GHB EST-MCNC: 192 MG/DL
GLOBULIN SER CALC-MCNC: 1.8 G/DL (ref 1.5–4.5)
GLUCOSE SERPL-MCNC: 116 MG/DL (ref 65–99)
HBA1C MFR BLD: 8.3 % (ref 4.8–5.6)
HDLC SERPL-MCNC: 62 MG/DL
LDLC SERPL CALC-MCNC: 66 MG/DL (ref 0–99)
MICROALBUMIN UR-MCNC: 135.6 UG/ML
POTASSIUM SERPL-SCNC: 4.2 MMOL/L (ref 3.5–5.2)
PROT SERPL-MCNC: 6.6 G/DL (ref 6–8.5)
SODIUM SERPL-SCNC: 144 MMOL/L (ref 134–144)
TRIGL SERPL-MCNC: 60 MG/DL (ref 0–149)
VLDLC SERPL CALC-MCNC: 12 MG/DL (ref 5–40)

## 2018-09-24 PROBLEM — M25.511 ACUTE PAIN OF RIGHT SHOULDER: Status: ACTIVE | Noted: 2018-09-24

## 2018-09-24 NOTE — PROGRESS NOTES
Your labs showed normal kidney and liver tests, cholesterol, and vitamin D level. Your A1c was 8.3%, meaning your average BS over the past 3 months was 192. The goal A1c is less than 7.0%. Your A1c was 8.4% in May. Your urine test showed you are spilling some protein into the urine, which is an early sign of diabetes starting to affect the kidneys. Be sure to follow up with Dr. Polo Lucia as scheduled on 10/24/18.

## 2018-09-28 ENCOUNTER — TELEPHONE (OUTPATIENT)
Dept: INTERNAL MEDICINE CLINIC | Facility: CLINIC | Age: 68
End: 2018-09-28

## 2018-09-28 NOTE — TELEPHONE ENCOUNTER
Yes, it is okay to take with her current medications. Be sure to take it with food because codeine can sometimes cause nausea or stomach upset.

## 2018-09-28 NOTE — TELEPHONE ENCOUNTER
Pt called and stated that she saw Dr Sheryl Baker for pain in her arm and he prescribed acetaminophen/codeine #3. She would like to know if that is okay to take with the current medications she is on. Pt can be reached at [phone].

## 2018-10-10 ENCOUNTER — HOSPITAL ENCOUNTER (OUTPATIENT)
Dept: MAMMOGRAPHY | Age: 68
Discharge: HOME | End: 2018-10-10
Attending: INTERNAL MEDICINE
Payer: MEDICARE

## 2018-10-10 DIAGNOSIS — Z12.31 VISIT FOR SCREENING MAMMOGRAM: ICD-10-CM

## 2018-10-10 PROCEDURE — 77067 SCR MAMMO BI INCL CAD: CPT

## 2018-10-23 ENCOUNTER — HOSPITAL ENCOUNTER (OUTPATIENT)
Dept: MRI IMAGING | Age: 68
Discharge: HOME | End: 2018-10-23
Attending: ORTHOPAEDIC SURGERY
Payer: COMMERCIAL

## 2018-10-23 DIAGNOSIS — M25.511 RIGHT SHOULDER PAIN: ICD-10-CM

## 2018-10-23 PROCEDURE — 73221 MRI JOINT UPR EXTREM W/O DYE: CPT

## 2018-10-24 ENCOUNTER — OFFICE VISIT (OUTPATIENT)
Dept: ENDOCRINOLOGY | Age: 68
End: 2018-10-24

## 2018-10-24 VITALS
SYSTOLIC BLOOD PRESSURE: 97 MMHG | DIASTOLIC BLOOD PRESSURE: 63 MMHG | WEIGHT: 130 LBS | HEIGHT: 58 IN | BODY MASS INDEX: 27.29 KG/M2 | HEART RATE: 80 BPM

## 2018-10-24 DIAGNOSIS — E10.9 TYPE 1 DIABETES MELLITUS WITHOUT COMPLICATION (HCC): Primary | ICD-10-CM

## 2018-10-24 DIAGNOSIS — E78.5 HYPERLIPIDEMIA, UNSPECIFIED HYPERLIPIDEMIA TYPE: ICD-10-CM

## 2018-10-24 DIAGNOSIS — I10 ESSENTIAL HYPERTENSION WITH GOAL BLOOD PRESSURE LESS THAN 130/80: ICD-10-CM

## 2018-10-24 RX ORDER — ACETAMINOPHEN AND CODEINE PHOSPHATE 300; 30 MG/1; MG/1
TABLET ORAL
Refills: 0 | COMMUNITY
Start: 2018-09-20 | End: 2019-02-13

## 2018-10-24 NOTE — PATIENT INSTRUCTIONS
Novolin 70/30 insulin:      Work days:         26 units breakfast, 5 units with dinner     Non-work days:  26 units breakfast, 14 units with dinner      If her sugar is <100 she can take half the dose,      If her sugar is < 70 she should hold the dose and       Please note our new policy, you must arrive to the clinic 15 minutes before your appointment time to allow enough time for proper check-in, adequate time to spend with your doctor, and also to respect the appointment time of the next patient. Not arriving 15 minutes in advance may result in having your appointment rescheduled for the next available day/time.  ----------------------------------------------------------------------------------------------------------------------    Below you will find a glucose log sheet which you can use to record your blood sugars. Without checking and recording what your home glucose levels are, it will be difficult to make any changes to your medication dose, even when significant changes may be needed. Please feel free to use the log below to record your home glucose levels. At the very least, I would like for you to login the entire 2-3 weeks just before your visit so we can make your visit much more productive and beneficial to you. GLUCOSE LOG SHEET:    Date Breakfast Lunch Dinner Bedtime Comments ? GLUCOSE LOG SHEET:    Date Breakfast Lunch Dinner Bedtime Comments ? GLUCOSE LOG SHEET:    Date Breakfast Lunch Dinner Bedtime Comments ?

## 2018-10-24 NOTE — PROGRESS NOTES
CHIEF COMPLAINT: f/u uncontrolled DM1    HISTORY OF PRESENT ILLNESS:   Fidel Meraz is a 80 y.o. female with a PMHx as noted below who presents for f/u of uncontrolled type 1 diabetes. A1c recently is 8.3%. Previously we had to customize her regimen as her sugars were significantly different on work days compared to non-work days. Patient fell on Sept 10th, and had an MRI yesterday, and so had not worked since then. MRI reviewed, no fracture or dislocation though she does have a complete tear of a tendon for which she will need to follow up with her treating provider. Review of home glucose:  Novolin 70/30 insulin:    Work days:         26 units breakfast, 5 units with dinner   Non-work days:  26 units breakfast, 16 units with dinner (has been doing 20/16)    If her sugar is <100 she can take half the dose,    If her sugar is < 70 she should hold the dose and     Home blood sugar review:       Reviewed:       AM            Dinner  150-190 mostly       Bedtime highly variable, 103-200     Review of most recent diabetes-related labs:  Lab Results   Component Value Date    HBA1C 8.3 (H) 09/19/2018    HBA1C 9.3 (H) 02/21/2018    HBA1C 8.6 (H) 04/28/2017    QUG9DAHJ 8.3 09/19/2018    FYI2RVNP 8.4 05/23/2018    ALK3RKNW 9.0 (A) 02/21/2018    CHOL 140 09/19/2018    LDLC 66 09/19/2018    GFRAA 92 09/19/2018    GFRNA 79 09/19/2018    MCACR 68.8 (H) 09/19/2018    TSH 0.925 02/21/2018    VITD3 37.6 09/19/2018     Lab Key:  [id number] = IA-2 pancreatic islet cell autoantibody  GADLT = FELI-65 autoantibody   MCACR = Urine Microalbumin  INSUL = Insulin level  CPEPL = C-peptide level      PAST MEDICAL/SURGICAL HISTORY:   Past Medical History:   Diagnosis Date    Arthritis     Diabetes ([address]     Hypercholesterolemia     Hypertension     Menopause     LMP-63years old?      Past Surgical History:   Procedure Laterality Date    EYE SURG ANT SGMT PROC UNLISTED      HX TUBAL LIGATION         ALLERGIES:   Allergies Allergen Reactions    Compazine [Prochlorperazine Edisylate] Other (comments)     Tongue swelling, drooling    Naprosyn [Naproxen] Other (comments)     Belching,gas,stomach pain    Percocet [Oxycodone-Acetaminophen] Other (comments)     Visual problems/seeings in triplicate per patient       MEDICATIONS ON ADMISSION:     Current Outpatient Medications:     acetaminophen-codeine (TYLENOL #3) 300-30 mg per tablet, TAKE 1 TABLET BY MOUTH EVERY 4 (FOUR) HOURS AS NEEDED FOR MODERATE PAIN, Disp: , Rfl: 0    sodium chloride (SALINE NASAL NA), by Nasal route., Disp: , Rfl:     cholecalciferol, vitamin D3, (VITAMIN D3) 2,000 unit tab, Take 1,000 Units by mouth., Disp: , Rfl:     Insulin Syringe-Needle, Dis Un 0.3 mL 30 gauge x 5/16\" syrg, Use to inject insulin twice a day for type 1 diabetes, Disp: 200 Syringe, Rfl: 3    insulin NPH/insulin regular (NOVOLIN 70/30 U-100 INSULIN) 100 unit/mL (70-30) injection, by SubCUTAneous route. Inject 20 units am  16 units PM Take 1/2 doses if BS less than or greater than 100 No doses if BS less than or greater than 70 (drink orange juice), Disp: , Rfl:     DULoxetine (CYMBALTA) 30 mg capsule, Take 2 Caps by mouth daily. For pain and depression. , Disp: 60 Cap, Rfl: 2    rosuvastatin (CRESTOR) 20 mg tablet, Take 1 Tab by mouth nightly.  Indications: hyperlipidemia, Disp: 90 Tab, Rfl: 3    ACCU-CHEK SOFTCLIX LANCETS misc, Use to test blood sugar 3 times per day for Dx: E11.9, Disp: 400 Each, Rfl: 3    ACCU-CHEK CAROL PLUS TEST STRP strip, Testing blood sugar 3 times per day Dx E11.9, Disp: 400 Strip, Rfl: 3    losartan (COZAAR) 50 mg tablet, TAKE 1 TABLET EVERY DAY  Indications: hypertension, Disp: 90 Tab, Rfl: 3    Insulin Syringe-Needle U-100 (BD LO-DOSE MICRO-FINE IV) 1/2 mL 28 gauge x 1/2\" syrg, Use to inject insulin twice daily dx, Disp: 200 Syringe, Rfl: 3    ACCU-CHEK CAROL PLUS METER misc, , Disp: , Rfl:     SYRINGE-NEEDLE,INSULIN,0.5 ML (B-D INSULIN SYRINGE LO-DOSE), by Does Not Apply route. Use to inject insulin twice a day for, Disp: , Rfl:     aspirin (ASPIRIN) 325 mg tablet, Take 1 Tab by mouth daily. , Disp: 30 Tab, Rfl: 5    lidocaine (LIDODERM) 5 %, Apply patch to the affected area for 12 hours a day and remove for 12 hours a day., Disp: 5 Each, Rfl: 0    SOCIAL HISTORY:   Social History     Socioeconomic History    Marital status:      Spouse name: Not on file    Number of children: Not on file    Years of education: Not on file    Highest education level: Not on file   Social Needs    Financial resource strain: Not on file    Food insecurity - worry: Not on file    Food insecurity - inability: Not on file   [employer] needs - medical: Not on file   [employer] needs - non-medical: Not on file   Occupational History    Not on file   Tobacco Use    Smoking status: Never Smoker    Smokeless tobacco: Never Used   Substance and Sexual Activity    Alcohol use: No    Drug use: No    Sexual activity: Not on file   Other Topics Concern    Not on file   Social History Narrative    Not on file       FAMILY HISTORY:  Family History   Problem Relation Age of Onset    Cancer Mother     Diabetes Sister     Heart Disease Sister     Diabetes Brother     Heart Disease Brother     Diabetes Maternal Grandmother     Diabetes Paternal Grandmother        REVIEW OF SYSTEMS: Complete ROS assessed and noted for that which is described above, all else are negative.   Eyes: normal  ENT: normal  CVS: normal  Resp: normal  GI: normal  : normal  GYN: normal  Endocrine: normal  Integument: normal  Musculoskeletal: normal  Neuro: normal  Psych: normal      PHYSICAL EXAMINATION:    VITAL SIGNS:  Visit Vitals  BP 97/63 (BP 1 Location: Left arm, BP Patient Position: Sitting)   Pulse 80   Ht 4' 10\" (1.473 m)   Wt 130 lb (59 kg)   BMI 27.17 kg/m²       GENERAL: NCAT, Sitting comfortably, NAD  EYES: EOMI, non-icteric, no proptosis  Ear/Nose/Throat: NCAT, no inflammation, no masses  LYMPH NODES: No LAD  CARDIOVASCULAR: S1 S2, RRR, No murmur, 2+ radial pulses  RESPIRATORY: CTA b/l, no wheeze/rales  GASTROINTESTINAL: NT, ND  MUSCULOSKELETAL: Normal ROM, no atrophy  SKIN: warm, no edema/rash/ or other skin changes  NEUROLOGIC: 5/5 power all extremities, no tremor, AAOx3  PSYCHIATRIC: Normal affect, Normal insight and judgement    Diabetic foot exam:     Left Foot:   Visual Exam: callous - at ball of foot, also with sandra toe deformity   Pulse DP: 2+ (normal)   Filament test: normal sensation    Vibratory sensation: Vibratory sensation: normal       Right Foot:   Visual Exam: normal  but with hammertoe deformity   Pulse DP: 2+ (normal)   Filament test: normal sensation    Vibratory sensation: Vibratory sensation: normal      REVIEW OF LABORATORY AND RADIOLOGY DATA:   Labs and documentation have been reviewed as described above. ASSESSMENT AND PLAN:   Tai Balderas is a 80 y.o. female with a PMHx as noted above who presents for f/u of uncontrolled type 1 diabetes. Problems:  Type 1 diabetes Uncontrolled  Hyperlipidemia  Hypertension    Lots of dinner time and bedtime hyperglycemia in the 200's. Sugars are lowest in the AM. She had been taking less AM insulin however needs more, will increase back up to 26 units. Review of her sugar log revealed no day time post prandial hypoglycemia, only a few lows in the AM for which the evening dose of insulin can be reduced if needed. Will cut back on that a bit and increase back up her AM dose. Patient advised to call me If she is having frequent lows.      PLAN  Type 1 Diabetes  Medications:  Novolin 70/30 insulin:    Work days:         26 units breakfast, 5 units with dinner   Non-work days:  26 units breakfast, 14 units with dinner    If her sugar is <100 she can take half the dose,    If her sugar is < 70 she should hold the dose and   Advised to check glucose  qAM/Dinner/Bedtime    HTN:  Losartan, bp stable  HLD: Crestor, lipids reviewed, stable    RTC: I would like to see them back in 3 months. Alban Rear.  [id number] [facility]

## 2018-10-29 DIAGNOSIS — G89.4 CHRONIC PAIN SYNDROME: ICD-10-CM

## 2018-10-29 RX ORDER — DULOXETIN HYDROCHLORIDE 30 MG/1
CAPSULE, DELAYED RELEASE ORAL
Qty: 60 CAP | Refills: 2 | Status: SHIPPED | OUTPATIENT
Start: 2018-10-29 | End: 2019-01-31 | Stop reason: SDUPTHER

## 2019-01-17 DIAGNOSIS — Z79.4 TYPE 2 DIABETES MELLITUS WITHOUT COMPLICATION, WITH LONG-TERM CURRENT USE OF INSULIN (HCC): ICD-10-CM

## 2019-01-17 DIAGNOSIS — E11.9 TYPE 2 DIABETES MELLITUS WITHOUT COMPLICATION, WITH LONG-TERM CURRENT USE OF INSULIN (HCC): ICD-10-CM

## 2019-01-17 RX ORDER — BLOOD SUGAR DIAGNOSTIC
STRIP MISCELLANEOUS
Qty: 300 STRIP | Refills: 3 | Status: SHIPPED | OUTPATIENT
Start: 2019-01-17 | End: 2020-03-04

## 2019-01-31 DIAGNOSIS — G89.4 CHRONIC PAIN SYNDROME: ICD-10-CM

## 2019-01-31 RX ORDER — DULOXETIN HYDROCHLORIDE 30 MG/1
CAPSULE, DELAYED RELEASE ORAL
Qty: 60 CAP | Refills: 2 | Status: SHIPPED | OUTPATIENT
Start: 2019-01-31 | End: 2019-02-13

## 2019-02-06 ENCOUNTER — OFFICE VISIT (OUTPATIENT)
Dept: ENDOCRINOLOGY | Age: 69
End: 2019-02-06

## 2019-02-06 VITALS
WEIGHT: 133 LBS | DIASTOLIC BLOOD PRESSURE: 79 MMHG | BODY MASS INDEX: 27.92 KG/M2 | SYSTOLIC BLOOD PRESSURE: 125 MMHG | HEART RATE: 84 BPM | HEIGHT: 58 IN

## 2019-02-06 DIAGNOSIS — E78.5 HYPERLIPIDEMIA, UNSPECIFIED HYPERLIPIDEMIA TYPE: ICD-10-CM

## 2019-02-06 DIAGNOSIS — I10 ESSENTIAL HYPERTENSION WITH GOAL BLOOD PRESSURE LESS THAN 130/80: ICD-10-CM

## 2019-02-06 DIAGNOSIS — E10.9 TYPE 1 DIABETES MELLITUS WITHOUT COMPLICATION (HCC): Primary | ICD-10-CM

## 2019-02-06 LAB — HBA1C MFR BLD HPLC: 9.3 %

## 2019-02-06 NOTE — PATIENT INSTRUCTIONS
SEND ME A SUGAR LOG IN 1 WEEK      Novolin 70/30 insulin:      26 units breakfast, 14 units with dinner      If her sugar is <100 she can take half the dose,      If her sugar is < 70 she should hold the dose     Check glucose AM/Dinner/Bedtime        Please note our new policy, you must arrive to the clinic 15 minutes before your appointment time to allow enough time for proper check-in, adequate time to spend with your doctor, and also to respect the appointment time of the next patient. Not arriving 15 minutes in advance may result in having your appointment rescheduled for the next available day/time.  ----------------------------------------------------------------------------------------------------------------------    Below you will find a glucose log sheet which you can use to record your blood sugars. Without checking and recording what your home glucose levels are, it will be difficult to make any changes to your medication dose, even when significant changes may be needed. Please feel free to use the log below to record your home glucose levels. At the very least, I would like for you to login the entire 2-3 weeks just before your visit so we can make your visit much more productive and beneficial to you. GLUCOSE LOG SHEET:    Date Breakfast Lunch Dinner Bedtime Comments ? GLUCOSE LOG SHEET:    Date Breakfast Lunch Dinner Bedtime Comments ? GLUCOSE LOG SHEET:    Date Breakfast Lunch Dinner Bedtime Comments ?

## 2019-02-06 NOTE — PROGRESS NOTES
CHIEF COMPLAINT: f/u uncontrolled DM1    HISTORY OF PRESENT ILLNESS:   Silvia Kuhn is a 80 y.o. female with a PMHx as noted below who presents for f/u of uncontrolled type 1 diabetes. A1c previously 8.3%, today is 9.3%. Reports she has been eating excessively due to her uncontrolled depression. She does not have a log or meter of her sugars today. She has had a couple of low sugars as as well has a lot of highs, and thus it is unsafe to adjust her insulin without her numbers. She has not worked since September. Review of home glucose:  Novolin 70/30 insulin:    Work days:         26 units breakfast, 5 units with dinner   Non-work days:  26 units breakfast, 14 units with dinner (has been doing 20/16)    If her sugar is <100 she can take half the dose,    If her sugar is < 70 she should hold the dose and     Home blood sugar review:      No log or meter with her today    Review of most recent diabetes-related labs:  Lab Results   Component Value Date    HBA1C 8.3 (H) 09/19/2018    HBA1C 9.3 (H) 02/21/2018    HBA1C 8.6 (H) 04/28/2017    ICO0FKPX 8.3 09/19/2018    TSA0VBJG 8.4 05/23/2018    LCM0PRNU 9.0 (A) 02/21/2018    CHOL 140 09/19/2018    LDLC 66 09/19/2018    GFRAA 92 09/19/2018    GFRNA 79 09/19/2018    MCACR 68.8 (H) 09/19/2018    TSH 0.925 02/21/2018    VITD3 37.6 09/19/2018     Lab Key:  [id number] = IA-2 pancreatic islet cell autoantibody  GADLT = FELI-65 autoantibody   MCACR = Urine Microalbumin  INSUL = Insulin level  CPEPL = C-peptide level      PAST MEDICAL/SURGICAL HISTORY:   Past Medical History:   Diagnosis Date    Arthritis     Cross-eye     Depression     Diabetes ([address]     Type 1    Flatulence     Hypercholesterolemia     Hypertension     Menopause     LMP-63years old?     Sinus congestion      Past Surgical History:   Procedure Laterality Date    EYE SURG ANT SGMT PROC UNLISTED      HX TUBAL LIGATION  [id number]       ALLERGIES:   Allergies   Allergen Reactions    Compazine [Prochlorperazine Edisylate] Other (comments)     Tongue swelling, drooling    Naprosyn [Naproxen] Other (comments)     Belching,gas,stomach pain    Percocet [Oxycodone-Acetaminophen] Other (comments)     Visual problems/seeings in triplicate per patient       MEDICATIONS ON ADMISSION:     Current Outpatient Medications:     DULoxetine (CYMBALTA) 30 mg capsule, TAKE 2 CAPSULES BY MOUTH DAILY. FOR PAIN AND DEPRESSION., Disp: 60 Cap, Rfl: 2    ACCU-CHEK CAROL PLUS TEST STRP strip, Testing blood sugar 3 times per day Dx E11.9, Disp: 300 Strip, Rfl: 3    losartan (COZAAR) 50 mg tablet, TAKE 1 TABLET EVERY DAY INDICATIONS: HYPERTENSION, Disp: 90 Tab, Rfl: 3    cholecalciferol, vitamin D3, (VITAMIN D3) 2,000 unit tab, Take 1,000 Units by mouth., Disp: , Rfl:     Insulin Syringe-Needle, Dis Un 0.3 mL 30 gauge x 5/16\" syrg, Use to inject insulin twice a day for type 1 diabetes, Disp: 200 Syringe, Rfl: 3    insulin NPH/insulin regular (NOVOLIN 70/30 U-100 INSULIN) 100 unit/mL (70-30) injection, by SubCUTAneous route. Inject 26 units am  16 units PM Take 1/2 doses if BS less than or greater than 100 No doses if BS less than or greater than 70 (drink orange juice), Disp: , Rfl:     rosuvastatin (CRESTOR) 20 mg tablet, Take 1 Tab by mouth nightly. Indications: hyperlipidemia, Disp: 90 Tab, Rfl: 3    ACCU-CHEK SOFTCLIX LANCETS misc, Use to test blood sugar 3 times per day for Dx: E11.9, Disp: 400 Each, Rfl: 3    Insulin Syringe-Needle U-100 (BD LO-DOSE MICRO-FINE IV) 1/2 mL 28 gauge x 1/2\" syrg, Use to inject insulin twice daily dx, Disp: 200 Syringe, Rfl: 3    ACCU-CHEK CAROL PLUS METER misc, , Disp: , Rfl:     SYRINGE-NEEDLE,INSULIN,0.5 ML (B-D INSULIN SYRINGE LO-DOSE), by Does Not Apply route. Use to inject insulin twice a day for, Disp: , Rfl:     aspirin (ASPIRIN) 325 mg tablet, Take 1 Tab by mouth daily. , Disp: 30 Tab, Rfl: 5    acetaminophen-codeine (TYLENOL #3) 300-30 mg per tablet, TAKE 1 TABLET BY MOUTH EVERY 4 (FOUR) HOURS AS NEEDED FOR MODERATE PAIN, Disp: , Rfl: 0    sodium chloride (SALINE NASAL NA), by Nasal route., Disp: , Rfl:     SOCIAL HISTORY:   Social History     Socioeconomic History    Marital status:      Spouse name: Not on file    Number of children: Not on file    Years of education: Not on file    Highest education level: Not on file   Social Needs    Financial resource strain: Not on file    Food insecurity - worry: Not on file    Food insecurity - inability: Not on file   [employer] needs - medical: Not on file   [employer] needs - non-medical: Not on file   Occupational History    Not on file   Tobacco Use    Smoking status: Never Smoker    Smokeless tobacco: Never Used   Substance and Sexual Activity    Alcohol use: No    Drug use: No    Sexual activity: Not on file   Other Topics Concern    Not on file   Social History Narrative    Not on file       FAMILY HISTORY:  Family History   Problem Relation Age of Onset    Cancer Mother     Diabetes Sister     Heart Disease Sister     Diabetes Brother     Heart Disease Brother     Diabetes Maternal Grandmother     Diabetes Paternal Grandmother        REVIEW OF SYSTEMS: Complete ROS assessed and noted for that which is described above, all else are negative.   Eyes: normal  ENT: normal  CVS: normal  Resp: normal  GI: normal  : normal  GYN: normal  Endocrine: normal  Integument: normal  Musculoskeletal: normal  Neuro: normal  Psych: normal      PHYSICAL EXAMINATION:    VITAL SIGNS:  Visit Vitals  /79 (BP 1 Location: Left arm, BP Patient Position: Sitting)   Pulse 84   Ht 4' 10\" (1.473 m)   Wt 133 lb (60.3 kg)   BMI 27.80 kg/m²       GENERAL: NCAT, Sitting comfortably, NAD  EYES: EOMI, non-icteric, no proptosis  Ear/Nose/Throat: NCAT, no inflammation, no masses  LYMPH NODES: No LAD  CARDIOVASCULAR: S1 S2, RRR, No murmur, 2+ radial pulses  RESPIRATORY: CTA b/l, no wheeze/rales  GASTROINTESTINAL: NT, ND  MUSCULOSKELETAL: Normal ROM, no atrophy  SKIN: warm, no edema/rash/ or other skin changes  NEUROLOGIC: 5/5 power all extremities, no tremor, AAOx3  PSYCHIATRIC: Normal affect, Normal insight and judgement      REVIEW OF LABORATORY AND RADIOLOGY DATA:   Labs and documentation have been reviewed as described above. ASSESSMENT AND PLAN:   Obed Garza is a 80 y.o. female with a PMHx as noted above who presents for f/u of uncontrolled type 1 diabetes. Problems:  Type 1 diabetes Uncontrolled  Hyperlipidemia  Hypertension    Patient feels her depression is not controlled and this is her reason for eating excessively and not taking great care of her self, including her diabetes self management. She previously kept good records and brought them to each visit, but lately has not been checking often or recording often, and her average sugars are much higher. Also as she his not working during the daytime as she did before, this contributes to a more sedentary lifestyle and thus higher average sugars. With the weather being mostly cold she has not been able to walk much. She is not planning to work again in the future. I have advised her to get me a weeks worth of numbers in 1 week, which she can mail to me so I can adjust her insulin by phone, noting that this is very important. She demonstrated her understanding. PLAN  Type 1 Diabetes  Medications:  Novolin 70/30 insulin:    26 units breakfast, 14 units with dinner    If her sugar is <100 she can take half the dose,    If her sugar is < 70 she should hold the dose and   Advised to check glucose  qAM/Dinner/Bedtime    HTN:  Losartan, bp stable  HLD:  Crestor, lipids reviewed, stable    RTC: I would like to see them back in 3 months, TO MAIL ME A LOG OF HER SUGAR IN 1 WEEK FOR DOSE ADJUSTMENT BY PHONE. Advised to talk to Dr. Maurizio Amato about her depression. Denies suicidal ideation. Has been thinking about her son who passed away. Jcarlos Mensah.  Davi Cantrell MD  [address] [place] [address] Endocrinology

## 2019-02-13 ENCOUNTER — OFFICE VISIT (OUTPATIENT)
Dept: INTERNAL MEDICINE CLINIC | Facility: CLINIC | Age: 69
End: 2019-02-13

## 2019-02-13 VITALS
TEMPERATURE: 98 F | SYSTOLIC BLOOD PRESSURE: 113 MMHG | DIASTOLIC BLOOD PRESSURE: 74 MMHG | HEIGHT: 58 IN | OXYGEN SATURATION: 96 % | HEART RATE: 78 BPM | BODY MASS INDEX: 27.71 KG/M2 | RESPIRATION RATE: 16 BRPM | WEIGHT: 132 LBS

## 2019-02-13 DIAGNOSIS — R80.9 TYPE 1 DIABETES MELLITUS WITH MICROALBUMINURIA (HCC): ICD-10-CM

## 2019-02-13 DIAGNOSIS — E66.3 OVERWEIGHT (BMI 25.0-29.9): ICD-10-CM

## 2019-02-13 DIAGNOSIS — I10 ESSENTIAL HYPERTENSION: ICD-10-CM

## 2019-02-13 DIAGNOSIS — E10.29 TYPE 1 DIABETES MELLITUS WITH MICROALBUMINURIA (HCC): ICD-10-CM

## 2019-02-13 DIAGNOSIS — M75.121 COMPLETE TEAR OF RIGHT ROTATOR CUFF: ICD-10-CM

## 2019-02-13 DIAGNOSIS — F33.0 MILD EPISODE OF RECURRENT MAJOR DEPRESSIVE DISORDER (HCC): Primary | ICD-10-CM

## 2019-02-13 DIAGNOSIS — G89.4 CHRONIC PAIN SYNDROME: ICD-10-CM

## 2019-02-13 DIAGNOSIS — Z12.11 SCREENING FOR COLON CANCER: ICD-10-CM

## 2019-02-13 DIAGNOSIS — Z23 ENCOUNTER FOR IMMUNIZATION: ICD-10-CM

## 2019-02-13 RX ORDER — DULOXETIN HYDROCHLORIDE 30 MG/1
CAPSULE, DELAYED RELEASE ORAL
Qty: 60 CAP | Refills: 2
Start: 2019-02-13 | End: 2019-05-09 | Stop reason: SDUPTHER

## 2019-02-13 RX ORDER — BUPROPION HYDROCHLORIDE 150 MG/1
150 TABLET, EXTENDED RELEASE ORAL
Qty: 30 TAB | Refills: 2 | Status: SHIPPED | OUTPATIENT
Start: 2019-02-13 | End: 2019-05-08 | Stop reason: SDUPTHER

## 2019-02-13 NOTE — PATIENT INSTRUCTIONS
Vaccine Information Statement     Pneumococcal Conjugate Vaccine (PCV13): What You Need to Know    Many Vaccine Information Statements are available in Kiswahili and other languages. See www.immunize.org/vis. Hojas de información Sobre Vacunas están disponibles en español y en muchos otros idiomas. Visite www.immunize.org/vis. 1. Why get vaccinated? Vaccination can protect both children and adults from pneumococcal disease. Pneumococcal disease is caused by bacteria that can spread from person to person through close contact. It can cause ear infections, and it can also lead to more serious infections of the:   Lungs (pneumonia),   Blood (bacteremia), and   Covering of the brain and spinal cord (meningitis). Pneumococcal pneumonia is most common among adults. Pneumococcal meningitis can cause deafness and brain damage, and it kills about 1 child in 10 who get it. Anyone can get pneumococcal disease, but children under 3years of age and adults 72 years and older, people with certain medical conditions, and cigarette smokers are at the highest risk. Before there was a vaccine, the [facility] saw:   more than 700 cases of meningitis,   about 13,000 blood infections,   about 5 million ear infections, and   about 200 deaths  in children under 5 each year from pneumococcal disease. Since vaccine became available, severe pneumococcal disease in these children has fallen by 88%. About 18,000 older adults die of pneumococcal disease each year in the [place]. Treatment of pneumococcal infections with penicillin and other drugs is not as effective as it used to be, because some strains of the disease have become resistant to these drugs. This makes prevention of the disease, through vaccination, even more important. 2. PCV13 vaccine    Pneumococcal conjugate vaccine (called PCV13) protects against 13 types of pneumococcal bacteria.       PCV13 is routinely given to children at 2, 4, 6, and 1515 months of age. It is also recommended for children and adults 3to 59years of age with certain health conditions, and for all adults 72years of age and older. Your doctor can give you details. 3. Some people should not get this vaccine    Anyone who has ever had a life-threatening allergic reaction to a dose of this vaccine, to an earlier pneumococcal vaccine called PCV7, or to any vaccine containing diphtheria toxoid (for example, DTaP), should not get PCV13. Anyone with a severe allergy to any component of PCV13 should not get the vaccine. Tell your doctor if the person being vaccinated has any severe allergies. If the person scheduled for vaccination is not feeling well, your healthcare provider might decide to reschedule the shot on another day. 4. Risks of a vaccine reaction    With any medicine, including vaccines, there is a chance of reactions. These are usually mild and go away on their own, but serious reactions are also possible. Problems reported following PCV13 varied by age and dose in the series. The most common problems reported among children were:    About half became drowsy after the shot, had a temporary loss of appetite, or had redness or tenderness where the shot was given.  About 1 out of 3 had swelling where the shot was given.  About 1 out of 3 had a mild fever, and about 1 in 20 had a fever over 102.2°F.   Up to about 8 out of 10 became fussy or irritable. Adults have reported pain, redness, and swelling where the shot was given; also mild fever, fatigue, headache, chills, or muscle pain. Jackie Tripathi children who get PCV13 along with inactivated flu vaccine at the same time may be at increased risk for seizures caused by fever. Ask your doctor for more information. Problems that could happen after any vaccine:     People sometimes faint after a medical procedure, including vaccination.  Sitting or lying down for about 15 minutes can help prevent fainting, and injuries caused by a fall. Tell your doctor if you feel dizzy, or have vision changes or ringing in the ears.  Some older children and adults get severe pain in the shoulder and have difficulty moving the arm where a shot was given. This happens very rarely.  Any medication can cause a severe allergic reaction. Such reactions from a vaccine are very rare, estimated at about 1 in a million doses, and would happen within a few minutes to a few hours after the vaccination. As with any medicine, there is a very small chance of a vaccine causing a serious injury or death. The safety of vaccines is always being monitored. For more information, visit: www.cdc.gov/vaccinesafety/     5. What if there is a serious reaction? What should I look for?  Look for anything that concerns you, such as signs of a severe allergic reaction, very high fever, or unusual behavior. Signs of a severe allergic reaction can include hives, swelling of the face and throat, difficulty breathing, a fast heartbeat, dizziness, and weakness - usually within a few minutes to a few hours after the vaccination. What should I do?  If you think it is a severe allergic reaction or other emergency that cant wait, call 9-1-1 or get the person to the nearest hospital. Otherwise, call your doctor. Reactions should be reported to the Vaccine Adverse Event Reporting System (VAERS). Your doctor should file this report, or you can do it yourself through the VAERS web site at www.vaers. hhs.gov, or by calling 0-486.931.6840. VAERS does not give medical advice. 6. The National Vaccine Injury Compensation Program    The [facility] Vaccine Injury Compensation Program (VICP) is a federal program that was created to compensate people who may have been injured by certain vaccines.     Persons who believe they may have been injured by a vaccine can learn about the program and about filing a claim by calling 1-397.875.5584 or visiting the [facility] website at www.[facility].gov/vaccinecompensation. There is a time limit to file a claim for compensation. 7. How can I learn more?  Ask your healthcare provider. He or she can give you the vaccine package insert or suggest other sources of information.  Call your local or state health department.  Contact the Centers for Disease Control and Prevention (CDC):  - Call 6-712.121.3688 (1-800-CDC-INFO) or  - Visit CDCs website at www.cdc.gov/vaccines    Vaccine Information Statement   PCV13 Vaccine   11/5/2015   43 MÓNICA Coley [id number]-05    Department of Health and Human Services  Centers for Disease Control and Prevention    Office Use Only

## 2019-02-13 NOTE — PROGRESS NOTES
Nasra Weston  Identified pt with two pt identifiers(name and ). Chief Complaint   Patient presents with    Hypertension    Diabetes    Cholesterol Problem    Arm Pain     right   Pneumonia vaccine PPSV23 0.5 ml given left deltoid IM. "[employer] Lot# [id number] exp.  VIS given. Patient tolerated procedure without incident. 1. Have you been to the ER, urgent care clinic since your last visit? Hospitalized since your last visit? NO    2. Have you seen or consulted any other health care providers outside of the [address] [place] [address] since your last visit? Include any pap smears or colon screening. Dr Ritika Singh provider has been notified of reason for visit, vitals and flowsheets obtained on patients.      Patient received paperwork for advance directive during previous visit but has not completed at this time     Reviewed record In preparation for visit, huddled with provider and have obtained necessary documentation      Health Maintenance Due   Topic    Shingrix Vaccine Age 50> (1 of 2)    FOBT Q 1 YEAR AGE 50-75     Pneumococcal 65+ Low/Medium Risk (2 of 2 - PPSV23)       Wt Readings from Last 3 Encounters:   19 132 lb (59.9 kg)   19 133 lb (60.3 kg)   10/24/18 130 lb (59 kg)     Temp Readings from Last 3 Encounters:   19 98 °F (36.7 °C) (Oral)   18 98.4 °F (36.9 °C) (Oral)   09/15/18 98.3 °F (36.8 °C)     BP Readings from Last 3 Encounters:   19 113/74   19 125/79   10/24/18 97/63     Pulse Readings from Last 3 Encounters:   19 78   19 84   10/24/18 80     Vitals:    19 0936   BP: 113/74   Pulse: 78   Resp: 16   Temp: 98 °F (36.7 °C)   TempSrc: Oral   SpO2: 96%   Weight: 132 lb (59.9 kg)   Height: 4' 10\" (1.473 m)   PainSc:   6   PainLoc: Arm         Learning Assessment:  :     Learning Assessment 2017   PRIMARY LEARNER Patient   PRIMARY LANGUAGE ENGLISH   LEARNER PREFERENCE PRIMARY READING   ANSWERED BY patient   RELATIONSHIP SELF       Depression Screening:  :     3 most recent PHQ Screens 2/13/2019   Little interest or pleasure in doing things Nearly every day   Feeling down, depressed, irritable, or hopeless Nearly every day   Total Score PHQ 2 6   Trouble falling or staying asleep, or sleeping too much More than half the days   Poor appetite, weight loss, or overeating Nearly every day   Feeling bad about yourself - or that you are a failure or have let yourself or your family down Not at all   Trouble concentrating on things such as school, work, reading, or watching TV Not at all   Moving or speaking so slowly that other people could have noticed; or the opposite being so fidgety that others notice Not at all   Thoughts of being better off dead, or hurting yourself in some way Not at all       Fall Risk Assessment:  :     Fall Risk Assessment, last 12 mths 9/19/2018   Able to walk? Yes   Fall in past 12 months? Yes   Fall with injury? Yes   Number of falls in past 12 months 2   Fall Risk Score 3       Abuse Screening:  :     Abuse Screening Questionnaire 4/28/2017   Do you ever feel afraid of your partner? N   Are you in a relationship with someone who physically or mentally threatens you? N   Is it safe for you to go home?  Y       ADL Screening:  :     ADL Assessment 5/23/2018   Feeding yourself No Help Needed   Getting from bed to chair No Help Needed   Getting dressed No Help Needed   Bathing or showering No Help Needed   Walk across the room (includes cane/walker) No Help Needed   Using the telphone No Help Needed   Taking your medications No Help Needed   Preparing meals No Help Needed   Managing money (expenses/bills) No Help Needed   Moderately strenuous housework (laundry) No Help Needed   Shopping for personal items (toiletries/medicines) No Help Needed   Shopping for groceries No Help Needed   Driving Help Needed   Climbing a flight of stairs No Help Needed   Getting to places beyond walking distances No Help Needed Medication reconciliation up to date and corrected with patient at this time.

## 2019-02-13 NOTE — PROGRESS NOTES
CC:   Chief Complaint   Patient presents with    Hypertension    Diabetes    Cholesterol Problem    Arm Pain     right       HISTORY OF PRESENT ILLNESS  Faviola Ortega is a 80 y.o. female. Presents for follow up evaluation. Last seen on 9/19/18; did not follow up in 3 months as instructed. She has type 1 DM, HTN, hyperlipidemia, chronic pain syndrome, and seasonal allergic rhinitis. Today she complains of right shoulder stiffness. She complains of right shoulder pain after having a fall at work on 9/10/18. Diagnosed with complete tear of right rotator cuff by Dr. Erroll Lombard (Orthopedics). Surgery was planned but cancelled; patient does not know the reason why, was originally planned at [address] then location changed to a place where she could not get transportation to. Has not worked since 9/10/18. She also complains of feeling depressed. Eating more, not being compliant with diabetic diet. Has depressed mood, lack of interest, and occasional feelings of hopelessness. Denies change in appetite, sleep, low energy, problems focusing, or moving slowly or restlessness. Denies SI or HI. Reports compliance with Cymbalta 30 mg 2 tabs daily for pain and depression; not helping with depression and complains that it gives her gas. Endocrine Review  She is seen for diabetes. Follows with Dr. Shantal Paul; last saw 2/6/19. Denies polydipsia, polyuria. She is taking her medications as instructed.  Home glucose monitoring: is performed regularly.  She reports medication compliance: compliant all of the time.  Medication side effects: none.  Diabetic diet compliance: noncompliant some of the time.  Lab review: A1c was 9.3% on 2/6/19 (up from 8.3% on 9/19/18).   Eye exam: UTD.        Cardiovascular Review  The patient has hypertension and hyperlipidemia.  She reports taking medications as instructed, no medication side effects noted.  Diet and Lifestyle: generally follows a low fat low cholesterol diet, generally follows a low sodium diet, no formal exercise but active during the day.  Lab review: orders written for new lab studies as appropriate; see orders.        Soc Hx  . Has 1 son; no grandchildren. Lives with her son in a trailer home. Presently unemployed. Does not drive; walks to work. Never smoker. Denies alcohol or recreational drug use. Drinks a cup of coffee twice daily.      Declined Shingrix vaccine because she never had chickenpox. ROS  Positive for bilateral foot pain. Wants to see podiatrist to discuss inserts for shoes. A complete review of systems was performed and is negative except for those mentioned in the HPI. Patient Active Problem List   Diagnosis Code    Type 1 diabetes mellitus without complication ([facility] [address] A59.6    Essential hypertension I10    Mixed hyperlipidemia E78.2    Allergic rhinitis J30.9    Vitamin D deficiency E55.9    Chronic left shoulder pain M25.512, G89.29    Primary osteoarthritis involving multiple joints M15.0    Obesity (BMI 30-39. 9) E66.9    Osteopenia M85.80    Depressed mood F32.9    Chronic pain syndrome G89.4    Acute pain of right shoulder M25.511     Past Medical History:   Diagnosis Date    Arthritis     Cross-eye     Depression     Diabetes ([facility] [address]     Type 1    Flatulence     Hypercholesterolemia     Hypertension     Menopause     LMP-63years old?  Sinus congestion      Allergies   Allergen Reactions    Compazine [Prochlorperazine Edisylate] Other (comments)     Tongue swelling, drooling    Naprosyn [Naproxen] Other (comments)     Belching,gas,stomach pain    Percocet [Oxycodone-Acetaminophen] Other (comments)     Visual problems/seeings in triplicate per patient       Current Outpatient Medications   Medication Sig Dispense Refill    fluticasone propionate (FLONASE NA) by Nasal route.  DULoxetine (CYMBALTA) 30 mg capsule TAKE 2 CAPSULES BY MOUTH DAILY. FOR PAIN AND DEPRESSION.  [facility] 22 TEST STRP strip Testing blood sugar 3 times per day Dx E11.9 300 Strip 3    losartan (COZAAR) 50 mg tablet TAKE 1 TABLET EVERY DAY INDICATIONS: HYPERTENSION 90 Tab 3    sodium chloride (SALINE NASAL NA) by Nasal route.  cholecalciferol, vitamin D3, (VITAMIN D3) 2,000 unit tab Take 1,000 Units by mouth.  Insulin Syringe-Needle, Dis Un 0.3 mL 30 gauge x 5/16\" syrg Use to inject insulin twice a day for type 1 diabetes 200 Syringe 3    insulin NPH/insulin regular (NOVOLIN 70/30 U-100 INSULIN) 100 unit/mL (70-30) injection by SubCUTAneous route. Inject 26 units am   16 units PM  Take 1/2 doses if BS less than or greater than 100  No doses if BS less than or greater than 70  (drink orange juice)      rosuvastatin (CRESTOR) 20 mg tablet Take 1 Tab by mouth nightly. Indications: hyperlipidemia 90 Tab 3    ACCU-CHEK SOFTCLIX LANCETS misc Use to test blood sugar 3 times per day for Dx: E11.9 400 Each 3    Insulin Syringe-Needle U-100 (BD LO-DOSE MICRO-FINE IV) 1/2 mL 28 gauge x 1/2\" syrg Use to inject insulin twice daily dx 200 Syringe 3    ACCU-CHEK CAROL PLUS METER misc       SYRINGE-NEEDLE,INSULIN,0.5 ML (B-D INSULIN SYRINGE LO-DOSE) by Does Not Apply route. Use to inject insulin twice a day for      aspirin (ASPIRIN) 325 mg tablet Take 1 Tab by mouth daily. 30 Tab 5         PHYSICAL EXAM  Visit Vitals  /74 (BP 1 Location: Right arm, BP Patient Position: Sitting)   Pulse 78   Temp 98 °F (36.7 °C) (Oral)   Resp 16   Ht 4' 10\" (1.473 m)   Wt 132 lb (59.9 kg)   SpO2 96%   BMI 27.59 kg/m²       General: Well-developed and well-nourished, no distress. HEENT:  Head normocephalic/atraumatic, no scleral icterus  Lungs:  Clear to ausculation bilaterally. Good air movement. Heart:  Regular rate and rhythm, normal S1 and S2, no murmur, gallop, or rub  Extremities: No clubbing, cyanosis, or edema. Decrease in ROM at right shoulder. Neurological: Alert and oriented. Psychiatric: Normal mood and affect. Behavior is normal.     Results for orders placed or performed in visit on 02/06/19   AMB POC HEMOGLOBIN A1C   Result Value Ref Range    Hemoglobin A1c (POC) 9.3 %         ASSESSMENT AND PLAN    ICD-10-CM ICD-9-CM    1. Mild episode of recurrent major depressive disorder (HCC) F33.0 296.31 buPROPion SR (WELLBUTRIN SR) 150 mg SR tablet      DULoxetine (CYMBALTA) 30 mg capsule   2. Type 1 diabetes mellitus with microalbuminuria (HCC) E10.29 250.41 REFERRAL TO PODIATRY    R80.9 791.0    3. Essential hypertension I10 401.9    4. Chronic pain syndrome G89.4 338.4 DULoxetine (CYMBALTA) 30 mg capsule   5. Complete tear of right rotator cuff M75.121 727.61    6. Overweight (BMI 25.0-29. 9) E66.3 278.02    7. Screening for colon cancer Z12.11 V76.51    8. Encounter for immunization Z23 V03.89 PNEUMOCOCCAL CONJ VACCINE 13 VALENT IM      ADMIN INFLUENZA VIRUS VAC     Diagnoses and all orders for this visit:    1. Mild episode of recurrent major depressive disorder (HCC)  Uncontrolled. PH- 9 score is 6 today. Change Cymbalta from once daily to BID. Add Wellbutrin. Encouraged to see a therapist; was not interested but was given a prescription referral to [facility] in [place] for counseling.  -     Start buPROPion SR [facility] SR) 150 mg SR tablet; Take 1 Tab by mouth every morning.  -     Change to taking as: DULoxetine (CYMBALTA) 30 mg capsule; TAKE 1 CAPSULE BY MOUTH 2 TIMES A DAY. FOR PAIN AND DEPRESSION. 2. Type 1 diabetes mellitus with microalbuminuria (HCC)  A1c 9.3% on 2/6/19. Uncontrolled due to noncompliance with diabetic diet related to depression.  -     REFERRAL TO PODIATRY    3. Essential hypertension  Well-controlled. Continue losartan. 4. Chronic pain syndrome  -     Continue DULoxetine (CYMBALTA) 30 mg capsule; TAKE 1 CAPSULE BY MOUTH 2 TIMES A DAY. FOR PAIN AND DEPRESSION.     5. Complete tear of right rotator cuff  Advised to call Workman's Compensation to see when her surgery can be rescheduled. 6. Overweight (BMI 25.0-29. 9)  Discussed the patient's BMI with her. The BMI follow up plan is as follows: dietary management education, guidance, and counseling; encourage exercise; monitor weight; prescribed dietary intake. Follow up BMI in 3 months. 7. Screening for colon cancer  Has FIT at home. Instructed to complete. 8. Encounter for immunization  -     PNEUMOCOCCAL CONJ VACCINE 13 VALENT IM  -     ADMIN INFLUENZA VIRUS VAC      Follow-up Disposition:  Return in about 2 months (around 4/13/2019), or if symptoms worsen or fail to improve, for Depression. I have discussed the diagnosis with the patient and the intended plan as seen in the above orders. Patient is in agreement. The patient has received an after-visit summary and questions were answered concerning future plans. I have discussed medication side effects and warnings with the patient as well.

## 2019-02-21 LAB — HEMOCCULT STL QL IA: NEGATIVE

## 2019-03-01 ENCOUNTER — TELEPHONE (OUTPATIENT)
Dept: ENDOCRINOLOGY | Age: 69
End: 2019-03-01

## 2019-03-01 NOTE — TELEPHONE ENCOUNTER
I called Ms. Weston and spoke with her daughter. I relayed the message from Dr. Jasen Garcia. She understood this information and wrote it down for her mom and said she would do as instructed.   Ivie Hodgkins

## 2019-03-01 NOTE — TELEPHONE ENCOUNTER
Patients home blood sugars reviewed dated 2/7-2/13    AM: 106-120's, 65x1,   Dinner: 200's mostly  Bedtime 160-200 mostly    Impression:  Daytime post prandial hyperglycemia, while fasting sugars are more stable:     Per last recommendation:  Novolin 70/30 insulin:            26 units breakfast, 14 units with dinner                          If her sugar is <100 she can take half the dose,                          If her sugar is < 70 she should hold the dose and     PLAN:     Novolin 70/30 insulin:            32 units breakfast, 14 units with dinner                          If her sugar is <100 she can take half the dose,                          If her sugar is < 70 she should hold the dose and       Amy NEUMANN  [address] [facility]  [address] [place] [address]

## 2019-04-21 DIAGNOSIS — E78.2 MIXED HYPERLIPIDEMIA: ICD-10-CM

## 2019-04-21 RX ORDER — ROSUVASTATIN CALCIUM 20 MG/1
TABLET, COATED ORAL
Qty: 90 TAB | Refills: 3 | Status: SHIPPED | OUTPATIENT
Start: 2019-04-21 | End: 2020-06-24

## 2019-05-30 PROBLEM — M75.121 NONTRAUMATIC COMPLETE TEAR OF RIGHT ROTATOR CUFF: Status: ACTIVE | Noted: 2018-09-24

## 2019-05-30 PROBLEM — S46.011A TRAUMATIC COMPLETE TEAR OF RIGHT ROTATOR CUFF: Status: ACTIVE | Noted: 2018-09-24

## 2019-06-10 DIAGNOSIS — E11.9 TYPE 2 DIABETES MELLITUS WITHOUT COMPLICATION, WITH LONG-TERM CURRENT USE OF INSULIN (HCC): ICD-10-CM

## 2019-06-10 DIAGNOSIS — Z79.4 TYPE 2 DIABETES MELLITUS WITHOUT COMPLICATION, WITH LONG-TERM CURRENT USE OF INSULIN (HCC): ICD-10-CM

## 2019-06-27 DIAGNOSIS — E10.9 TYPE 1 DIABETES MELLITUS WITHOUT COMPLICATION (HCC): ICD-10-CM

## 2019-06-27 RX ORDER — LANCETS
EACH MISCELLANEOUS
Qty: 400 EACH | Refills: 3 | Status: SHIPPED | OUTPATIENT
Start: 2019-06-27 | End: 2020-05-20 | Stop reason: SDUPTHER

## 2019-07-31 ENCOUNTER — OFFICE VISIT (OUTPATIENT)
Dept: INTERNAL MEDICINE CLINIC | Facility: CLINIC | Age: 69
End: 2019-07-31

## 2019-07-31 VITALS
BODY MASS INDEX: 29.72 KG/M2 | SYSTOLIC BLOOD PRESSURE: 137 MMHG | DIASTOLIC BLOOD PRESSURE: 81 MMHG | RESPIRATION RATE: 14 BRPM | HEART RATE: 76 BPM | HEIGHT: 58 IN | WEIGHT: 141.6 LBS | OXYGEN SATURATION: 96 % | TEMPERATURE: 98.4 F

## 2019-07-31 DIAGNOSIS — I10 ESSENTIAL HYPERTENSION: ICD-10-CM

## 2019-07-31 DIAGNOSIS — Z00.00 MEDICARE ANNUAL WELLNESS VISIT, SUBSEQUENT: Primary | ICD-10-CM

## 2019-07-31 DIAGNOSIS — Z13.5 SCREENING FOR GLAUCOMA: ICD-10-CM

## 2019-07-31 DIAGNOSIS — F33.1 MODERATE EPISODE OF RECURRENT MAJOR DEPRESSIVE DISORDER (HCC): ICD-10-CM

## 2019-07-31 DIAGNOSIS — E11.9 TYPE 2 DIABETES MELLITUS WITHOUT COMPLICATION, WITH LONG-TERM CURRENT USE OF INSULIN (HCC): ICD-10-CM

## 2019-07-31 DIAGNOSIS — E78.2 MIXED HYPERLIPIDEMIA: ICD-10-CM

## 2019-07-31 DIAGNOSIS — S46.011S TRAUMATIC COMPLETE TEAR OF RIGHT ROTATOR CUFF, SEQUELA: ICD-10-CM

## 2019-07-31 DIAGNOSIS — Z79.4 TYPE 2 DIABETES MELLITUS WITHOUT COMPLICATION, WITH LONG-TERM CURRENT USE OF INSULIN (HCC): ICD-10-CM

## 2019-07-31 DIAGNOSIS — M85.89 OSTEOPENIA OF MULTIPLE SITES: ICD-10-CM

## 2019-07-31 DIAGNOSIS — Z71.89 ADVANCE CARE PLANNING: ICD-10-CM

## 2019-07-31 DIAGNOSIS — E10.9 TYPE 1 DIABETES MELLITUS WITHOUT COMPLICATION (HCC): ICD-10-CM

## 2019-07-31 LAB — HBA1C MFR BLD HPLC: 9.4 %

## 2019-07-31 RX ORDER — ASPIRIN 81 MG/1
81 TABLET ORAL DAILY
Qty: 90 TAB | Refills: 3
Start: 2019-07-31

## 2019-07-31 RX ORDER — ACETAMINOPHEN 500 MG
TABLET ORAL
COMMUNITY
End: 2020-01-14

## 2019-07-31 RX ORDER — NAPHAZOLINE HYDROCHLORIDE AND POLYETHYLENE GLYCOL 300 .1; 2 MG/ML; MG/ML
SOLUTION/ DROPS OPHTHALMIC
Qty: 200 SYRINGE | Refills: 3 | Status: SHIPPED | OUTPATIENT
Start: 2019-07-31 | End: 2020-10-21

## 2019-07-31 RX ORDER — MELOXICAM 15 MG/1
TABLET ORAL
Refills: 2 | COMMUNITY
Start: 2019-07-06 | End: 2020-01-14 | Stop reason: ALTCHOICE

## 2019-07-31 RX ORDER — BUPROPION HYDROCHLORIDE 150 MG/1
TABLET, EXTENDED RELEASE ORAL
Qty: 60 TAB | Refills: 5 | Status: SHIPPED | OUTPATIENT
Start: 2019-07-31 | End: 2020-01-18

## 2019-07-31 NOTE — PATIENT INSTRUCTIONS
Patient Instructions  1. Increase insulin 70/30 to 35 units before breakfast and 14 units before dinner. 2.  Increase Wellbutrin  mg to 2 times a day (morning and evening). 3.  Take duloxetine 30 mg 1 tab once a day for 1 week then stop taking it. 4.  Call my clinic in 2 weeks and leave a message for me about your blood sugars readings before breakfast and 2 hours after dinner. Medicare Wellness Visit, Female     The best way to live healthy is to have a lifestyle where you eat a well-balanced diet, exercise regularly, limit alcohol use, and quit all forms of tobacco/nicotine, if applicable. Regular preventive services are another way to keep healthy. Preventive services (vaccines, screening tests, monitoring & exams) can help personalize your care plan, which helps you manage your own care. Screening tests can find health problems at the earliest stages, when they are easiest to treat. [facility] follows the current, evidence-based guidelines published by the [facility] ([facility]) when recommending preventive services for our patients. Because we follow these guidelines, sometimes recommendations change over time as research supports it. (For example, mammograms used to be recommended annually. Even though Medicare will still pay for an annual mammogram, the newer guidelines recommend a mammogram every two years for women of average risk.)  Of course, you and your doctor may decide to screen more often for some diseases, based on your risk and your health status. Preventive services for you include:  - Medicare offers their members a free annual wellness visit, which is time for you and your primary care provider to discuss and plan for your preventive service needs. Take advantage of this benefit every year!  -All adults over the age of 72 should receive the recommended pneumonia vaccines.  Current USPSTF guidelines recommend a series of two vaccines for the best pneumonia protection.   -All adults should have a flu vaccine yearly and a tetanus vaccine every 10 years. All adults age 61 and older should receive a shingles vaccine once in their lifetime.    -A bone mass density test is recommended when a woman turns 65 to screen for osteoporosis. This test is only recommended one time, as a screening. Some providers will use this same test as a disease monitoring tool if you already have osteoporosis. -All adults age 38-68 who are overweight should have a diabetes screening test once every three years.   -Other screening tests and preventive services for persons with diabetes include: an eye exam to screen for diabetic retinopathy, a kidney function test, a foot exam, and stricter control over your cholesterol.   -Cardiovascular screening for adults with routine risk involves an electrocardiogram (ECG) at intervals determined by your doctor.   -Colorectal cancer screenings should be done for adults age 54-65 with no increased risk factors for colorectal cancer. There are a number of acceptable methods of screening for this type of cancer. Each test has its own benefits and drawbacks. Discuss with your doctor what is most appropriate for you during your annual wellness visit. The different tests include: colonoscopy (considered the best screening method), a fecal occult blood test, a fecal DNA test, and sigmoidoscopy. -Breast cancer screenings are recommended every other year for women of normal risk, age 54-69.  -Cervical cancer screenings for women over age 72 are only recommended with certain risk factors.   -All adults born between [facility] should be screened once for Hepatitis C.      Here is a list of your current Health Maintenance items (your personalized list of preventive services) with a due date:  Health Maintenance Due   Topic Date Due    Hemoglobin A1C    08/06/2019    Glaucoma Screening   09/13/2019

## 2019-07-31 NOTE — ACP (ADVANCE CARE PLANNING)
Advance Care Planning (ACP) Provider Conversation Snapshot    Date of ACP Conversation: 07/31/19  Persons included in Conversation:  patient  Length of ACP Conversation in minutes:  <16 minutes (Non-Billable)    Authorized Decision Maker (if patient is incapable of making informed decisions):    This person is:   Healthcare Agent/Medical Power of  under Advance Directive            For Patients with Decision Making Capacity:   Values/Goals: Exploration of values, goals, and preferences if recovery is not expected, even with continued medical treatment in the event of:  Imminent death  Severe, permanent brain injury    Conversation Outcomes / Follow-Up Plan:   Recommended completion of Advance Directive form after review of ACP materials and conversation with prospective healthcare agent

## 2019-07-31 NOTE — PROGRESS NOTES
Darryl Weston  Identified pt with two pt identifiers(name and ). Chief Complaint   Patient presents with    Annual Wellness Visit     Room 3A// NON fasting     Diabetes       1. Have you been to the ER, urgent care clinic since your last visit? Hospitalized since your last visit? NO    2. Have you seen or consulted any other health care providers outside of the [address] [place] [address] since your last visit? Include any pap smears or colon screening. NO      Provider notified of reason for visit, vitals and flowsheets obtained on patients.      Patient received paperwork for advance directive during previous visit but has not completed at this time     Reviewed record In preparation for visit, huddled with provider and have obtained necessary documentation      Health Maintenance Due   Topic    MEDICARE YEARLY EXAM     HEMOGLOBIN A1C Q6M     GLAUCOMA SCREENING Q2Y        Wt Readings from Last 3 Encounters:   19 141 lb 9.6 oz (64.2 kg)   19 132 lb (59.9 kg)   19 133 lb (60.3 kg)     Temp Readings from Last 3 Encounters:   19 98.4 °F (36.9 °C) (Oral)   19 98 °F (36.7 °C) (Oral)   18 98.4 °F (36.9 °C) (Oral)     BP Readings from Last 3 Encounters:   19 137/81   19 113/74   19 125/79     Pulse Readings from Last 3 Encounters:   19 76   19 78   19 84     Vitals:    19 1311   BP: 137/81   Pulse: 76   Resp: 14   Temp: 98.4 °F (36.9 °C)   TempSrc: Oral   SpO2: 96%   Weight: 141 lb 9.6 oz (64.2 kg)   Height: 4' 10\" (1.473 m)   PainSc:   0 - No pain         Learning Assessment:  :     Learning Assessment 2017   PRIMARY LEARNER Patient   PRIMARY LANGUAGE ENGLISH   LEARNER PREFERENCE PRIMARY READING   ANSWERED BY patient   RELATIONSHIP SELF       Depression Screening:  :     3 most recent PHQ Screens 2019   Little interest or pleasure in doing things Nearly every day   Feeling down, depressed, irritable, or hopeless Nearly every day   Total Score PHQ 2 6   Trouble falling or staying asleep, or sleeping too much More than half the days   Poor appetite, weight loss, or overeating Nearly every day   Feeling bad about yourself - or that you are a failure or have let yourself or your family down Not at all   Trouble concentrating on things such as school, work, reading, or watching TV Not at all   Moving or speaking so slowly that other people could have noticed; or the opposite being so fidgety that others notice Not at all   Thoughts of being better off dead, or hurting yourself in some way Not at all       Fall Risk Assessment:  :     Fall Risk Assessment, last 12 mths 7/31/2019   Able to walk? Yes   Fall in past 12 months? Yes   Fall with injury? Yes   Number of falls in past 12 months 4   Fall Risk Score 5       Abuse Screening:  :     Abuse Screening Questionnaire 7/31/2019 4/28/2017   Do you ever feel afraid of your partner? N N   Are you in a relationship with someone who physically or mentally threatens you? N N   Is it safe for you to go home? Y Y       ADL Screening:  :     ADL Assessment 5/23/2018   Feeding yourself No Help Needed   Getting from bed to chair No Help Needed   Getting dressed No Help Needed   Bathing or showering No Help Needed   Walk across the room (includes cane/walker) No Help Needed   Using the telphone No Help Needed   Taking your medications No Help Needed   Preparing meals No Help Needed   Managing money (expenses/bills) No Help Needed   Moderately strenuous housework (laundry) No Help Needed   Shopping for personal items (toiletries/medicines) No Help Needed   Shopping for groceries No Help Needed   Driving Help Needed   Climbing a flight of stairs No Help Needed   Getting to places beyond walking distances No Help Needed         Medication reconciliation up to date and corrected with patient at this time.

## 2019-07-31 NOTE — PROGRESS NOTES
This is the Subsequent Medicare Annual Wellness Exam, performed 12 months or more after the Initial AWV or the last Subsequent AWV    I have reviewed the patient's medical history in detail and updated the computerized patient record. History   Anselmo Reyes is a 80 y.o. female. Presents for follow up evaluation. Last seen 2/13/19. Did not follow up in 2 months as instructed. She has type 1 DM, HTN, hyperlipidemia, chronic pain syndrome, and seasonal allergic rhinitis. Today she has no complaints. Underwent right shoulder rotator cuff repair surgery with Dr. Roark Closs on 2/28/19. Still recovering. Doing PT 2 times a week. Depressed mood a little better on Wellbutrin. Has loss of interest in doing things and occasional feelings of hopelessness. Denies change in appetite, sleep, low energy, problems focusing, or moving slowly or restlessness. Denies SI or HI. Reports compliance with Cymbalta 30 mg 2 tabs daily for pain and depression; not helping with depression and complains that it gives her gas. Endocrine Review  She is seen for diabetes.  Follows with Dr. Shahram Galvan; last saw 2/6/19.  Now has MCR and does not think she will be able to afford to see him.   Denies polydipsia, polyuria. Guerda Capellan is taking insulin 70/30 32 units before breakfast and 14 units before dinner.  Home glucose monitoring: is performed regularly.  She reports medication compliance: compliant all of the time.  Medication side effects: none.  Diabetic diet compliance: noncompliant some of the time.  Lab review: A1c is 9.4% today (7/31/19), was 9.3% on 2/6/19.  Eye exam: overdue.       Cardiovascular Review  The patient has hypertension and hyperlipidemia.  She reports taking medications as instructed, no medication side effects noted.  Diet and Lifestyle: generally follows a low fat low cholesterol diet, generally follows a low sodium diet, no formal exercise but active during the day.  Lab review: orders written for new lab studies as appropriate; see orders.        Soc Hx  . Has 1 son; no grandchildren. Lives alone in a trailer home. Unemployed. Does not drive. Never smoker. Denies alcohol or recreational drug use. Drinks a cup of coffee daily.      Health Maintenance  Flu vaccine: declined  Pneumonia vaccine: PCV-13 2/13/19, due for PPSV-23 in 2/20  Tetanus vaccine: Td 4/13/10     Zoster vaccine: declined, never had chickenpox  Colonoscopy: 2/14/19, negative  Mammogram: 10/10/18  Bone density: 8/2/17, osteopenia  Eye exam: 9/13/17, due for this  Foot exam: 10/24/18  Lipids: 9/19/18 (tot chol 140, LDL 66)  A1c: 7/31/19 (9.4%)  Urine microalbumin: 9/19/18  Advanced Directives: discussed, received information at a previous visit  End of Life: discussed, received information at a previous visit       ROS  A complete review of systems was performed and is negative except for those mentioned in the HPI.      Past Medical History:   Diagnosis Date    Arthritis     Cross-eye     Depression     Diabetes ([facility] [place] [address]     Type 1    Flatulence     Hypercholesterolemia     Hypertension     Menopause     LMP-63years old?  Sinus congestion       Past Surgical History:   Procedure Laterality Date    EYE SURG ANT SGMT PROC UNLISTED      HX ORTHOPAEDIC  02/28/2019    Dr. Leigh Espinoza. Shoulder arthoscopy, rotator cuff repair x 2, subacromial decompression, distal claviculolectomy    HX TUBAL LIGATION  [id number]     Current Outpatient Medications   Medication Sig Dispense Refill    meloxicam (MOBIC) 15 mg tablet TAKE 1 TABLET (15 MG TOTAL) BY MOUTH DAILY TAKE ON FULL STOMACH  2    acetaminophen (TYLENOL) 500 mg tablet Take  by mouth every six (6) hours as needed for Pain.  insulin NPH/insulin regular (NOVOLIN 70/30 U-100 INSULIN) 100 unit/mL (70-30) injection 35 units before breakfast and 14 units before dinner. Dx: E10.9 50 mL 0    buPROPion SR (WELLBUTRIN SR) 150 mg SR tablet TAKE 1 TABLET BY MOUTH TWICE A DAY.  60 Tab 5    insulin syringe-needle U-100 (INSULIN SYRINGE) 1 mL 29 gauge x 1/2\" syrg Use to check home blood sugars 2 times a day. CDx: E10.9 200 Syringe 3    ACCU-CHEK SOFTCLIX LANCETS misc Use to test blood sugar 3 times per day for Dx: E11.9 400 Each 3    DULoxetine (CYMBALTA) 30 mg capsule TAKE 2 CAPSULES BY MOUTH DAILY. FOR PAIN AND DEPRESSION. 60 Cap 3    rosuvastatin (CRESTOR) 20 mg tablet TAKE 1 TABLET BY MOUTH AT BEDTIME 90 Tab 3    ACCU-CHEK CAROL PLUS TEST STRP strip Testing blood sugar 3 times per day Dx E11.9 300 Strip 3    losartan (COZAAR) 50 mg tablet TAKE 1 TABLET EVERY DAY INDICATIONS: HYPERTENSION 90 Tab 3    sodium chloride (SALINE NASAL NA) by Nasal route.  cholecalciferol, vitamin D3, (VITAMIN D3) 2,000 unit tab Take 1,000 Units by mouth.  Insulin Syringe-Needle, Dis Un 0.3 mL 30 gauge x 5/16\" syrg Use to inject insulin twice a day for type 1 diabetes 200 Syringe 3    Insulin Syringe-Needle U-100 (BD LO-DOSE MICRO-FINE IV) 1/2 mL 28 gauge x 1/2\" syrg Use to inject insulin twice daily dx 200 Syringe 3    ACCU-CHEK CAROL PLUS METER misc       SYRINGE-NEEDLE,INSULIN,0.5 ML (B-D INSULIN SYRINGE LO-DOSE) by Does Not Apply route. Use to inject insulin twice a day for      aspirin (ASPIRIN) 325 mg tablet Take 1 Tab by mouth daily. 30 Tab 5    fluticasone propionate (FLONASE NA) by Nasal route.        Allergies   Allergen Reactions    Compazine [Prochlorperazine Edisylate] Other (comments)     Tongue swelling, drooling    Naprosyn [Naproxen] Other (comments)     Belching,gas,stomach pain    Percocet [Oxycodone-Acetaminophen] Other (comments)     Visual problems/seeings in triplicate per patient     Family History   Problem Relation Age of Onset    Cancer Mother     Diabetes Sister     Heart Disease Sister     Diabetes Brother     Heart Disease Brother     Diabetes Maternal Grandmother     Diabetes Paternal Grandmother      Social History     Tobacco Use    Smoking status: Never Smoker    Smokeless tobacco: Never Used   Substance Use Topics    Alcohol use: No     Patient Active Problem List   Diagnosis Code    Type 1 diabetes mellitus without complication ([facility] [address] V53.2    Essential hypertension I10    Mixed hyperlipidemia E78.2    Allergic rhinitis J30.9    Vitamin D deficiency E55.9    Chronic left shoulder pain M25.512, G89.29    Primary osteoarthritis involving multiple joints M15.0    Obesity (BMI 30-39. 9) E66.9    Osteopenia M85.80    Depressed mood F32.9    Chronic pain syndrome G89.4    Traumatic complete tear of right rotator cuff S46.011A       Depression Risk Factor Screening:     3 most recent PHQ Screens 2/13/2019   Little interest or pleasure in doing things Nearly every day   Feeling down, depressed, irritable, or hopeless Nearly every day   Total Score PHQ 2 6   Trouble falling or staying asleep, or sleeping too much More than half the days   Poor appetite, weight loss, or overeating Nearly every day   Feeling bad about yourself - or that you are a failure or have let yourself or your family down Not at all   Trouble concentrating on things such as school, work, reading, or watching TV Not at all   Moving or speaking so slowly that other people could have noticed; or the opposite being so fidgety that others notice Not at all   Thoughts of being better off dead, or hurting yourself in some way Not at all     Alcohol Risk Factor Screening: You do not drink alcohol or very rarely. Functional Ability and Level of Safety:   Hearing Loss  Hearing is good. Activities of Daily Living  The home contains: no safety equipment. Patient does total self care    Fall Risk  Fall Risk Assessment, last 12 mths 7/31/2019   Able to walk? Yes   Fall in past 12 months? Yes   Fall with injury?  Yes   Number of falls in past 12 months 4   Fall Risk Score 5       Abuse Screen  Patient is not abused    Cognitive Screening   Evaluation of Cognitive Function:  Has your family/caregiver stated any concerns about your memory: no  Normal    Visit Vitals  /81 (BP 1 Location: Left arm, BP Patient Position: Sitting)   Pulse 76   Temp 98.4 °F (36.9 °C) (Oral)   Resp 14   Ht 4' 10\" (1.473 m)   Wt 141 lb 9.6 oz (64.2 kg)   SpO2 96%   BMI 29.59 kg/m²   9 lb weight increase since last clinic 5 months ago    General: Overweight, no distress. HEENT:  Head normocephalic/atraumatic, no scleral icterus  Neck: Supple. No carotid bruits, JVD, lymphadenopathy, or thyromegaly. Lungs:  Clear to ausculation bilaterally. Good air movement. Heart:  Regular rate and rhythm, normal S1 and S2, no murmur, gallop, or rub  Extremities: No clubbing, cyanosis, or edema. Decreased ROM at right shoulder to 90 degrees with flexion and abduction. Neurological: Alert and oriented. Psychiatric: Normal mood and affect. Behavior is normal.     Results for orders placed or performed in visit on 07/31/19   AMB POC HEMOGLOBIN A1C   Result Value Ref Range    Hemoglobin A1c (POC) 9.4 %       Patient Care Team   Patient Care Team:  Zabrina Mike MD as PCP - General (Internal Medicine)    Assessment/Plan   Education and counseling provided:  Are appropriate based on today's review and evaluation  End-of-Life planning (with patient's consent)  Screening for glaucoma    Diagnoses and all orders for this visit:    1. Medicare annual wellness visit, subsequent    2. Type 1 diabetes mellitus without complication (HCC)  Uncontrolled. A1c 9.4% today.  -     REFERRAL TO OPTOMETRY  -     AMB POC HEMOGLOBIN A1C  -     Change to: insulin NPH/insulin regular (NOVOLIN 70/30 U-100 INSULIN) 100 unit/mL (70-30) injection; 35 units before breakfast and 14 units before dinner. Dx: E10.9  -     Start insulin syringe-needle U-100 (INSULIN SYRINGE) 1 mL 29 gauge x 1/2\" syrg; Use to check home blood sugars 2 times a day. CDx: E10.9  Patient Instructions  1.   Increase insulin 70/30 to 35 units before breakfast and 14 units before dinner. 2.  Increase Wellbutrin  mg to 2 times a day (morning and evening). 3.  Take duloxetine 30 mg 1 tab once a day for 1 week then stop taking it. 4.  Call my clinic in 2 weeks and leave a message for me about your blood sugars readings before breakfast and 2 hours after dinner. 3. Essential hypertension  Controlled. Continue present management. 4. Traumatic complete tear of right rotator cuff, sequela  S/p surgical repair in 2/19. Continue physical therapy. 5. Osteopenia of multiple sites    6. Mixed hyperlipidemia  Controlled. Continue Crestor. 7. Moderate episode of recurrent major depressive disorder (HCC)  -     Increase to: buPROPion SR (WELLBUTRIN SR) 150 mg SR tablet; TAKE 1 TABLET BY MOUTH TWICE A DAY. -     Stop Cymbalta since it is not helping her depression. (see Patient Instructions above)    8. Screening for glaucoma  -     REFERRAL TO OPTOMETRY (Free eye clinic at [facility])    9. Advance care planning      Follow-up and Dispositions    · Return in about 3 months (around 10/31/2019), or if symptoms worsen or fail to improve, for DM, HTN, fasting labs.          Health Maintenance Due   Topic Date Due    HEMOGLOBIN A1C Q6M  08/06/2019    GLAUCOMA SCREENING Q2Y  09/13/2019

## 2019-09-11 DIAGNOSIS — E10.9 TYPE 1 DIABETES MELLITUS WITHOUT COMPLICATION (HCC): ICD-10-CM

## 2019-10-30 DIAGNOSIS — I10 ESSENTIAL HYPERTENSION: ICD-10-CM

## 2019-10-30 RX ORDER — LOSARTAN POTASSIUM 50 MG/1
TABLET ORAL
Qty: 90 TAB | Refills: 3 | Status: SHIPPED | OUTPATIENT
Start: 2019-10-30 | End: 2019-12-11 | Stop reason: ALTCHOICE

## 2019-11-11 ENCOUNTER — PATIENT OUTREACH (OUTPATIENT)
Dept: INTERNAL MEDICINE CLINIC | Facility: CLINIC | Age: 69
End: 2019-11-11

## 2019-11-11 NOTE — PROGRESS NOTES
Ambulatory Care Manager outreached to patient today to offer care management services. Introduction to self and role of care manager provided. Patient declined care management services at this time. No follow up call scheduled at this time. Patient has Ambulatory Care Manager's contact number for for any questions or concerns. Patient denies any needs at this time. She has recently gotten her medicaid coverage back. She has limited resources and does not drive. She uses [employer] transportation or walks to [facility]. She will follow up with PCP in December and plans to schedule follow up with Dr. Waldo Fleming after that. She has not seen Dr. Waldo Fleming ([facility]) since the beginning of the year d/t insurance issues. She plans to follow up with podiatry for DM foot exam and optometrist for glaucoma screening. Last OV A1c was 9.4, patient declines DM educational material and states she has not been eating well d/t depression. She denies any educational needs and voices appreciation for the phone call. No further ACM follow up at this time.

## 2019-12-11 ENCOUNTER — TELEPHONE (OUTPATIENT)
Dept: INTERNAL MEDICINE CLINIC | Facility: CLINIC | Age: 69
End: 2019-12-11

## 2019-12-11 DIAGNOSIS — E78.2 MIXED HYPERLIPIDEMIA: ICD-10-CM

## 2019-12-11 DIAGNOSIS — I10 ESSENTIAL HYPERTENSION: Primary | ICD-10-CM

## 2019-12-11 RX ORDER — IRBESARTAN 150 MG/1
150 TABLET ORAL DAILY
Qty: 90 TAB | Refills: 1 | Status: SHIPPED | OUTPATIENT
Start: 2019-12-11 | End: 2020-06-10

## 2019-12-11 NOTE — TELEPHONE ENCOUNTER
[facility] on file submitted a fax stating that the order for Losartan Potassium 50mg tablet is \"backordered/unavailable\". They are requesting a new Rx.

## 2020-01-14 ENCOUNTER — OFFICE VISIT (OUTPATIENT)
Dept: INTERNAL MEDICINE CLINIC | Facility: CLINIC | Age: 70
End: 2020-01-14

## 2020-01-14 VITALS
RESPIRATION RATE: 16 BRPM | WEIGHT: 138.8 LBS | SYSTOLIC BLOOD PRESSURE: 127 MMHG | DIASTOLIC BLOOD PRESSURE: 57 MMHG | OXYGEN SATURATION: 98 % | TEMPERATURE: 98.1 F | HEART RATE: 73 BPM | BODY MASS INDEX: 29.14 KG/M2 | HEIGHT: 58 IN

## 2020-01-14 DIAGNOSIS — E66.3 OVERWEIGHT (BMI 25.0-29.9): ICD-10-CM

## 2020-01-14 DIAGNOSIS — E78.2 MIXED HYPERLIPIDEMIA: ICD-10-CM

## 2020-01-14 DIAGNOSIS — Z12.11 SCREENING FOR COLON CANCER: ICD-10-CM

## 2020-01-14 DIAGNOSIS — I10 ESSENTIAL HYPERTENSION: ICD-10-CM

## 2020-01-14 DIAGNOSIS — E10.9 TYPE 1 DIABETES MELLITUS WITHOUT COMPLICATION (HCC): Primary | ICD-10-CM

## 2020-01-14 DIAGNOSIS — E55.9 VITAMIN D DEFICIENCY: ICD-10-CM

## 2020-01-14 DIAGNOSIS — M15.9 PRIMARY OSTEOARTHRITIS INVOLVING MULTIPLE JOINTS: ICD-10-CM

## 2020-01-14 DIAGNOSIS — F33.1 MODERATE EPISODE OF RECURRENT MAJOR DEPRESSIVE DISORDER (HCC): ICD-10-CM

## 2020-01-14 DIAGNOSIS — Z12.31 ENCOUNTER FOR SCREENING MAMMOGRAM FOR MALIGNANT NEOPLASM OF BREAST: ICD-10-CM

## 2020-01-14 LAB — HBA1C MFR BLD HPLC: 8.8 %

## 2020-01-14 RX ORDER — DICLOFENAC SODIUM 10 MG/G
2 GEL TOPICAL 4 TIMES DAILY
Qty: 100 G | Refills: 3 | Status: SHIPPED | OUTPATIENT
Start: 2020-01-14 | End: 2020-07-15

## 2020-01-14 NOTE — PROGRESS NOTES
Scout [place] En[place]  Identified pt with two pt identifiers(name and ). Chief Complaint   Patient presents with    Diabetes       1. Have you been to the ER, urgent care clinic since your last visit? Hospitalized since your last visit? NO    2. Have you seen or consulted any other health care providers outside of the [address] [place] [address] since your last visit? Include any pap smears or colon screening. NO    Today's provider has been notified of reason for visit, vitals and flowsheets obtained on patients.      Patient received paperwork for advance directive during previous visit but has not completed at this time     Reviewed record In preparation for visit, huddled with provider and have obtained necessary documentation      Health Maintenance Due   Topic    Influenza Age 5 to Adult     GLAUCOMA SCREENING Q2Y     EYE EXAM RETINAL OR DILATED     MICROALBUMIN Q1     LIPID PANEL Q1     FOOT EXAM Q1     HEMOGLOBIN A1C Q6M     FOBT Q 1 YEAR AGE 50-75        Wt Readings from Last 3 Encounters:   20 138 lb 12.8 oz (63 kg)   19 141 lb 9.6 oz (64.2 kg)   19 132 lb (59.9 kg)     Temp Readings from Last 3 Encounters:   20 98.1 °F (36.7 °C) (Oral)   19 98.4 °F (36.9 °C) (Oral)   19 98 °F (36.7 °C) (Oral)     BP Readings from Last 3 Encounters:   20 127/57   19 137/81   19 113/74     Pulse Readings from Last 3 Encounters:   20 73   19 76   19 78     Vitals:    20 1321   BP: 127/57   Pulse: 73   Resp: 16   Temp: 98.1 °F (36.7 °C)   TempSrc: Oral   SpO2: 98%   Weight: 138 lb 12.8 oz (63 kg)   Height: 4' 10\" (1.473 m)   PainSc:   8   PainLoc: Arm         Learning Assessment:  :     Learning Assessment 2017   PRIMARY LEARNER Patient   PRIMARY LANGUAGE ENGLISH   LEARNER PREFERENCE PRIMARY READING   ANSWERED BY patient   RELATIONSHIP SELF       Depression Screening:  :     3 most recent PHQ Screens 2020   Little interest or pleasure in doing things Several days   Feeling down, depressed, irritable, or hopeless Nearly every day   Total Score PHQ 2 4   Trouble falling or staying asleep, or sleeping too much Nearly every day   Feeling tired or having little energy Nearly every day   Poor appetite, weight loss, or overeating More than half the days   Feeling bad about yourself - or that you are a failure or have let yourself or your family down Not at all   Trouble concentrating on things such as school, work, reading, or watching TV Not at all   Moving or speaking so slowly that other people could have noticed; or the opposite being so fidgety that others notice Not at all   Thoughts of being better off dead, or hurting yourself in some way -   How difficult have these problems made it for you to do your work, take care of your home and get along with others Not difficult at all       Fall Risk Assessment:  :     Fall Risk Assessment, last 12 mths 1/14/2020   Able to walk? Yes   Fall in past 12 months? No   Fall with injury? -   Number of falls in past 12 months -   Fall Risk Score -       Abuse Screening:  :     Abuse Screening Questionnaire 7/31/2019 4/28/2017   Do you ever feel afraid of your partner? N N   Are you in a relationship with someone who physically or mentally threatens you? N N   Is it safe for you to go home?  Y Y       ADL Screening:  :     ADL Assessment 5/23/2018   Feeding yourself No Help Needed   Getting from bed to chair No Help Needed   Getting dressed No Help Needed   Bathing or showering No Help Needed   Walk across the room (includes cane/walker) No Help Needed   Using the telphone No Help Needed   Taking your medications No Help Needed   Preparing meals No Help Needed   Managing money (expenses/bills) No Help Needed   Moderately strenuous housework (laundry) No Help Needed   Shopping for personal items (toiletries/medicines) No Help Needed   Shopping for groceries No Help Needed   Driving Help Needed   Climbing a flight of stairs No Help Needed   Getting to places beyond walking distances No Help Needed                 Medication reconciliation up to date and corrected with patient at this time.

## 2020-01-14 NOTE — PROGRESS NOTES
CC:   Chief Complaint   Patient presents with    Diabetes       HISTORY OF PRESENT ILLNESS  Tiffanie Faulkner is a 77 y.o. female. Presents for follow up evaluation. Last seen 6 months ago; did not follow up in 3 months as instructed. She has type 1 DM, HTN, hyperlipidemia, chronic pain syndrome, and seasonal allergic rhinitis. Complains of feeling tired. Still depressed but a little better on Wellbutrin  mg twice daily. Cymbalta did not help her symptoms so was changed to Wellbutrin at last clinic visit. Denies SI or HI.       Has chronic low back pain and diffuse pains at joints. ES Tylenol and meloxicam did not help so stopped both. Presently taking nothing for pain. Endocrine Review  She is seen for diabetes. Follows with Dr. Renetta Solares; last saw 2/6/19. Says she has not seen him for a long time because she lost Medicaid; now has coverage again. Denies polydipsia, polyuria. Felicity Samaniego is taking insulin 70/30 35 units before breakfast and 14 units before dinner.  Home glucose monitoring: is performed regularly.  Brought in BS log. Jan log shows FBS  and PM BS (after dinner) 197-431. She reports medication compliance: compliant all of the time.  Medication side effects: none.  Diabetic diet compliance: noncompliant some of the time.  Lab review: A1c is 8.8% today (1/14/20), was 9.4% today on 7/31/19.  Eye exam: overdue.       Cardiovascular Review  The patient has hypertension and hyperlipidemia.  She reports taking medications as instructed, no medication side effects noted.  Diet and Lifestyle: generally follows a low fat low cholesterol diet, generally follows a low sodium diet, no formal exercise but active during the day.  Lab review: orders written for new lab studies as appropriate; see orders.        Soc Hx  . Has 1 son; no grandchildren. Lives alone in a trailer home. Unemployed. Does not drive. Never smoker. Denies alcohol or recreational drug use.  No longer drinks coffee.      Health Maintenance  Flu vaccine: declined  Pneumonia vaccine: PCV-13 2/13/19, due for PPSV-23 in 2/20  Tetanus vaccine: Td 4/13/10                            Zoster vaccine: declined, never had chickenpox  Colonoscopy: FIT 2/14/19, negative, due for this  Mammogram: 10/10/18, due for this  Bone density: 8/2/17, osteopenia  Eye exam: 9/13/17, due for this  Foot exam: 1/14/20  Lipids: 9/19/18 (tot chol 140, LDL 66)  A1c: 1/14/20 (8.8%)  Urine microalbumin: 9/19/18, due for this  Advanced Directives: discussed, received information at a previous visit  End of Life: discussed, received information at a previous visit                                     ROS  A complete review of systems was performed and is negative except for those mentioned in the HPI.      Patient Active Problem List   Diagnosis Code    Type 1 diabetes mellitus without complication ([facility] [address] C42.2    Essential hypertension I10    Mixed hyperlipidemia E78.2    Allergic rhinitis J30.9    Vitamin D deficiency E55.9    Chronic left shoulder pain M25.512, G89.29    Primary osteoarthritis involving multiple joints M15.0    Obesity (BMI 30-39. 9) E66.9    Osteopenia M85.80    Depressed mood F32.9    Chronic pain syndrome G89.4    Traumatic complete tear of right rotator cuff S46.011A     Past Medical History:   Diagnosis Date    Arthritis     Cross-eye     Depression     Diabetes ([facility] [address]     Type 1    Flatulence     Hypercholesterolemia     Hypertension     Menopause     LMP-63years old?     Sinus congestion      Allergies   Allergen Reactions    Compazine [Prochlorperazine Edisylate] Other (comments)     Tongue swelling, drooling    Naprosyn [Naproxen] Other (comments)     Belching,gas,stomach pain    Percocet [Oxycodone-Acetaminophen] Other (comments)     Visual problems/seeings in triplicate per patient       Current Outpatient Medications   Medication Sig Dispense Refill    irbesartan (AVAPRO) 150 mg tablet Take 1 Tab by mouth daily. (Substitute for losartan, on back order) 90 Tab 1    insulin NPH/insulin regular (NOVOLIN 70/30 U-100 INSULIN) 100 unit/mL (70-30) injection 35 units before breakfast and 14 units before dinner. Dx: E10.9. NO FURTHER REFILLS TILL SEEN IN OFFICE. 50 mL 0    meloxicam (MOBIC) 15 mg tablet TAKE 1 TABLET (15 MG TOTAL) BY MOUTH DAILY TAKE ON FULL STOMACH  2    buPROPion SR (WELLBUTRIN SR) 150 mg SR tablet TAKE 1 TABLET BY MOUTH TWICE A DAY. 60 Tab 5    insulin syringe-needle U-100 (INSULIN SYRINGE) 1 mL 29 gauge x 1/2\" syrg Use to check home blood sugars 2 times a day. CDx: E10.9 200 Syringe 3    aspirin delayed-release 81 mg tablet Take 1 Tab by mouth daily. 90 Tab 3    ACCU-CHEK SOFTCLIX LANCETS [facility] Use to test blood sugar 3 times per day for Dx: E11.9 400 Each 3    rosuvastatin (CRESTOR) 20 mg tablet TAKE 1 TABLET BY MOUTH AT BEDTIME 90 Tab 3    ACCU-CHEK CAROL PLUS TEST STRP strip Testing blood sugar 3 times per day Dx E11.9 300 Strip 3    cholecalciferol, vitamin D3, (VITAMIN D3) 2,000 unit tab Take 1,000 Units by mouth.  ACCU-CHEK CAROL PLUS METER [facility]            PHYSICAL EXAM  Visit Vitals  /57 (BP 1 Location: Left arm, BP Patient Position: Sitting)   Pulse 73   Temp 98.1 °F (36.7 °C) (Oral)   Resp 16   Ht 4' 10\" (1.473 m)   Wt 138 lb 12.8 oz (63 kg)   SpO2 98%   BMI 29.01 kg/m²       General: Overweight, no distress. HEENT:  Head normocephalic/atraumatic, no scleral icterus  Neck: Supple. No carotid bruits, JVD, lymphadenopathy, or thyromegaly. Lungs:  Clear to ausculation bilaterally. Good air movement. Heart:  Regular rate and rhythm, normal S1 and S2, no murmur, gallop, or rub  Extremities: No clubbing, cyanosis, or edema. Neurological: Alert and oriented. Psychiatric: Normal mood and affect.  Behavior is normal.   Diabetic foot exam:     Left Foot:   Visual Exam: callous - Callus at sole of foot, bunion, 2nd toe overlapping great toe   Pulse DP: 2+ (normal)   Filament test: reduced sensation    Vibratory sensation: normal      Right Foot:   Visual Exam: callous - Small callus at sole of foot, bunion, 2nd toe overlapping great toe   Pulse DP: 2+ (normal)   Filament test: normal sensation    Vibratory sensation: normal        Results for orders placed or performed in visit on 01/14/20   [facility] POC HEMOGLOBIN A1C   Result Value Ref Range    Hemoglobin A1c (POC) 8.8 %         ASSESSMENT AND PLAN    ICD-10-CM ICD-9-CM    1. Type 1 diabetes mellitus without complication (HCC) A19.2 250.01  DIABETES FOOT EXAM      MICROALBUMIN, UR, RAND W/ MICROALB/CREAT RATIO      REFERRAL TO OPHTHALMOLOGY      AMB POC HEMOGLOBIN A1C      REFERRAL TO PODIATRY      REFERRAL TO ENDOCRINOLOGY      insulin NPH/insulin regular (NOVOLIN 70/30 U-100 INSULIN) 100 unit/mL (70-30) injection   2. Essential hypertension I10 401.9 CBC WITH AUTOMATED DIFF      METABOLIC PANEL, COMPREHENSIVE   3. Mixed hyperlipidemia E78.2 272.2 LIPID PANEL      TSH RFX ON ABNORMAL TO FREE T4   4. Primary osteoarthritis involving multiple joints M15.0 715.09    5. Vitamin D deficiency E55.9 268.9 VITAMIN D, 25 HYDROXY   6. Moderate episode of recurrent major depressive disorder (HCC) F33.1 296.32    7. Screening for colon cancer Z12.11 V76.51 OCCULT BLOOD IMMUNOASSAY,DIAGNOSTIC   8. Overweight (BMI 25.0-29. 9) E66.3 278.02    9. Encounter for screening mammogram for malignant neoplasm of breast  Z12.31 V76.12 [facility] MAMMO BI SCREENING INCL CAD     Diagnoses and all orders for this visit:    1. Type 1 diabetes mellitus without complication (HCC)  H7Z 0.6% today.   Improving but not at goal of less than 7.0%.  -      DIABETES FOOT EXAM  -     MICROALBUMIN, UR, RAND W/ MICROALB/CREAT RATIO  -     REFERRAL TO OPHTHALMOLOGY (Dr. Bladimir Skelton)  -     [facility] POC HEMOGLOBIN A1C  -     REFERRAL TO PODIATRY (Dr. Delon Waller)  -     REFERRAL TO ENDOCRINOLOGY (Dr. Emma Cortes)  -     Increase to: insulin NPH/insulin regular (NOVOLIN 70/30 U-100 INSULIN) 100 unit/mL (70-30) injection; 37 units before breakfast and 16 units before dinner. Dx: E10.9. NO FURTHER REFILLS TILL SEEN IN OFFICE. 2. Essential hypertension  Controlled. Continue present management. -     CBC WITH AUTOMATED DIFF  -     METABOLIC PANEL, COMPREHENSIVE    3. Mixed hyperlipidemia  -     LIPID PANEL  -     TSH RFX ON ABNORMAL TO FREE T4    4. Primary osteoarthritis involving multiple joints  Start diclofenac gel%; prescription sent to her pharmacy. 5. Vitamin D deficiency  -     VITAMIN D, 25 HYDROXY    6. Moderate episode of recurrent major depressive disorder (HCC)  Stable on Wellbutrin. 7. Screening for colon cancer  -     OCCULT BLOOD IMMUNOASSAY,DIAGNOSTIC; Future    8. Overweight (BMI 25.0-29. 9)  Discussed the patient's BMI with her. The BMI follow up plan is as follows: dietary management education, guidance, and counseling; encourage exercise; monitor weight; prescribed dietary intake. Follow up BMI in 4 months. 9. Encounter for screening mammogram for malignant neoplasm of breast   -     [facility] MAMMO BI SCREENING INCL CAD; Future    Greater than 40 mins direct face-to-face time spent with patient. Greater than 50% of time spent on counseling and coordination of care. Follow-up and Dispositions    · Return in about 4 months (around 5/14/2020), or if symptoms worsen or fail to improve, for DM, depression. I have discussed the diagnosis with the patient and the intended plan as seen in the above orders. Patient is in agreement. The patient has received an after-visit summary and questions were answered concerning future plans. I have discussed medication side effects and warnings with the patient as well.

## 2020-01-14 NOTE — PROGRESS NOTES
CC:  
Chief Complaint Patient presents with  Diabetes HISTORY OF PRESENT ILLNESS Ladarius Byers is a 77 y.o. female. Presents for follow up evaluation. Last seen 6 months ago; did not follow up in 3 months as instructed. She has type 1 DM, HTN, hyperlipidemia, chronic pain syndrome, and seasonal allergic rhinitis. Today she has no complaints. Depressed mood a little better on Wellbutrin. Has loss of interest in doing things and occasional feelings of hopelessness. Denies change in appetite, sleep, low energy, problems focusing, or moving slowly or restlessness.  Denies SI or HI.  Reports compliance with Cymbalta 30 mg 2 tabs daily for pain and depression; not helping with depression and complains that it gives her gas. 
  
 
Feels titred and dperessed. Endocrine Review She is seen for diabetes.  Follows with Dr. Jose Antonio Jj; last saw 2/6/19.  Now has MCR and does not think she will be able to afford to see him. Denies polydipsia, polyuria. Tom Cook is taking insulin 70/30 32 units before breakfast and 14 units before dinner.  Home glucose monitoring: is performed regularly.  She reports medication compliance: compliant all of the time.  Medication side effects: none.  Diabetic diet compliance: noncompliant some of the time.  Lab review: A1c is 8.8% toda (1/14/20), A1c is 9.4% today (7/31/19), was 9.3% on 2/6/19.  Eye exam: overdue.   
  
Cardiovascular Review The patient has hypertension and hyperlipidemia.  She reports taking medications as instructed, no medication side effects noted.  Diet and Lifestyle: generally follows a low fat low cholesterol diet, generally follows a low sodium diet, no formal exercise but active during the day.  Lab review: orders written for new lab studies as appropriate; see orders.   
   
Soc Hx . Has 1 son; no grandchildren. Lives alone in a trailer home. Unemployed. Does not drive. Never smoker.  Denies alcohol or recreational drug use. Stoped deinking. coffee daily. 
[address] [address] Maintenance Flu vaccine: declined Pneumonia vaccine: PCV-13 2/13/19, due for PPSV-23 in 2/20 Tetanus vaccine: Td 4/13/10 Zoster vaccine: declined, never had chickenpox Colonoscopy: 2/14/19, negative Mammogram: 10/10/18 Bone density: 8/2/17, osteopenia Eye exam: 9/13/17, due for this Foot exam: 10/24/18 Lipids: 9/19/18 (tot chol 140, LDL 66) A1c: 7/31/19 (9.4%) Urine microalbumin: 9/19/18 Advanced Directives: discussed, received information at a previous visit End of Life: discussed, received information at a previous visit                                 
  ROS A complete review of systems was performed and is negative except for those mentioned in the HPI.  
  
Patient Active Problem List  
Diagnosis Code  Type 1 diabetes mellitus without complication (HCC) T89.4  Essential hypertension I10  
 Mixed hyperlipidemia E78.2  Allergic rhinitis J30.9  Vitamin D deficiency E55.9  Chronic left shoulder pain M25.512, G89.29  
 Primary osteoarthritis involving multiple joints M15.0  Obesity (BMI 30-39. 9) E66.9  
 Osteopenia M85.80  Depressed mood F32.9  Chronic pain syndrome G89.4  Traumatic complete tear of right rotator cuff S46.011A Past Medical History:  
Diagnosis Date  Arthritis  Cross-eye  Depression  Diabetes ([facility] [address] Type 1  Flatulence  Hypercholesterolemia  Hypertension  Menopause LMP-63years old?  Sinus congestion Allergies Allergen Reactions  Compazine [Prochlorperazine Edisylate] Other (comments) Tongue swelling, drooling  Naprosyn [Naproxen] Other (comments) Belching,gas,stomach pain  Percocet [Oxycodone-Acetaminophen] Other (comments) Visual problems/seeings in triplicate per patient Current Outpatient Medications Medication Sig Dispense Refill  irbesartan (AVAPRO) 150 mg tablet Take 1 Tab by mouth daily. (Substitute for losartan, on back order) 90 Tab 1  
 insulin NPH/insulin regular (NOVOLIN 70/30 U-100 INSULIN) 100 unit/mL (70-30) injection 35 units before breakfast and 14 units before dinner. Dx: E10.9. NO FURTHER REFILLS TILL SEEN IN OFFICE. 50 mL 0  
 meloxicam (MOBIC) 15 mg tablet TAKE 1 TABLET (15 MG TOTAL) BY MOUTH DAILY TAKE ON FULL STOMACH  2  
 buPROPion SR (WELLBUTRIN SR) 150 mg SR tablet TAKE 1 TABLET BY MOUTH TWICE A DAY. 60 Tab 5  
 insulin syringe-needle U-100 (INSULIN SYRINGE) 1 mL 29 gauge x 1/2\" syrg Use to check home blood sugars 2 times a day. CDx: E10.9 200 Syringe 3  
 aspirin delayed-release 81 mg tablet Take 1 Tab by mouth daily. 90 Tab 3  ACCU-CHEK SOFTCLIX LANCETS [facility] Use to test blood sugar 3 times per day for Dx: E11.9 400 Each 3  
 rosuvastatin (CRESTOR) 20 mg tablet TAKE 1 TABLET BY MOUTH AT BEDTIME 90 Tab 3  ACCU-CHEK CAROL PLUS TEST STRP strip Testing blood sugar 3 times per day Dx E11.9 300 Strip 3  cholecalciferol, vitamin D3, (VITAMIN D3) 2,000 unit tab Take 1,000 Units by mouth.  ACCU-CHEK CAROL PLUS METER [facility] PHYSICAL EXAM 
Visit Vitals /57 (BP 1 Location: Left arm, BP Patient Position: Sitting) Pulse 73 Temp 98.1 °F (36.7 °C) (Oral) Resp 16 Ht 4' 10\" (1.473 m) Wt 138 lb 12.8 oz (63 kg) SpO2 98% BMI 29.01 kg/m² General: Well-developed and well-nourished, no distress. HEENT:  Head normocephalic/atraumatic, no scleral icterus Lungs:  Clear to ausculation bilaterally. Good air movement. Heart:  Regular rate and rhythm, normal S1 and S2, no murmur, gallop, or rub Extremities: No clubbing, cyanosis, or edema. Neurological: Alert and oriented. Psychiatric: Normal mood and affect. Behavior is normal.  
 
Results for orders placed or performed in visit on 01/14/20 AMB POC HEMOGLOBIN A1C Result Value Ref Range Hemoglobin A1c (POC) 8.8 % ASSESSMENT AND PLAN 
{ASSESSMENT/PLAN:74205} I have discussed the diagnosis with the patient and the intended plan as seen in the above orders. Patient is in agreement. The patient has received an after-visit summary and questions were answered concerning future plans. I have discussed medication side effects and warnings with the patient as well.

## 2020-01-15 LAB
25(OH)D3+25(OH)D2 SERPL-MCNC: 39.6 NG/ML (ref 30–100)
ALBUMIN SERPL-MCNC: 4.7 G/DL (ref 3.6–4.8)
ALBUMIN/CREAT UR: 17.8 MG/G CREAT (ref 0–30)
ALBUMIN/GLOB SERPL: 2 {RATIO} (ref 1.2–2.2)
ALP SERPL-CCNC: 116 IU/L (ref 39–117)
ALT SERPL-CCNC: 40 IU/L (ref 0–32)
AST SERPL-CCNC: 33 IU/L (ref 0–40)
BASOPHILS # BLD AUTO: 0 X10E3/UL (ref 0–0.2)
BASOPHILS NFR BLD AUTO: 1 %
BILIRUB SERPL-MCNC: 0.6 MG/DL (ref 0–1.2)
BUN SERPL-MCNC: 16 MG/DL (ref 8–27)
BUN/CREAT SERPL: 20 (ref 12–28)
CALCIUM SERPL-MCNC: 10.1 MG/DL (ref 8.7–10.3)
CHLORIDE SERPL-SCNC: 100 MMOL/L (ref 96–106)
CHOLEST SERPL-MCNC: 148 MG/DL (ref 100–199)
CO2 SERPL-SCNC: 22 MMOL/L (ref 20–29)
CREAT SERPL-MCNC: 0.81 MG/DL (ref 0.57–1)
CREAT UR-MCNC: 123.3 MG/DL
EOSINOPHIL # BLD AUTO: 0.2 X10E3/UL (ref 0–0.4)
EOSINOPHIL NFR BLD AUTO: 2 %
ERYTHROCYTE [DISTWIDTH] IN BLOOD BY AUTOMATED COUNT: 12.7 % (ref 11.7–15.4)
GLOBULIN SER CALC-MCNC: 2.3 G/DL (ref 1.5–4.5)
GLUCOSE SERPL-MCNC: 132 MG/DL (ref 65–99)
HCT VFR BLD AUTO: 41.7 % (ref 34–46.6)
HDLC SERPL-MCNC: 63 MG/DL
HGB BLD-MCNC: 13.7 G/DL (ref 11.1–15.9)
IMM GRANULOCYTES # BLD AUTO: 0 X10E3/UL (ref 0–0.1)
IMM GRANULOCYTES NFR BLD AUTO: 0 %
LDLC SERPL CALC-MCNC: 73 MG/DL (ref 0–99)
LYMPHOCYTES # BLD AUTO: 1.8 X10E3/UL (ref 0.7–3.1)
LYMPHOCYTES NFR BLD AUTO: 27 %
MCH RBC QN AUTO: 29.8 PG (ref 26.6–33)
MCHC RBC AUTO-ENTMCNC: 32.9 G/DL (ref 31.5–35.7)
MCV RBC AUTO: 91 FL (ref 79–97)
MICROALBUMIN UR-MCNC: 21.9 UG/ML
MONOCYTES # BLD AUTO: 0.6 X10E3/UL (ref 0.1–0.9)
MONOCYTES NFR BLD AUTO: 9 %
NEUTROPHILS # BLD AUTO: 4.2 X10E3/UL (ref 1.4–7)
NEUTROPHILS NFR BLD AUTO: 61 %
PLATELET # BLD AUTO: 246 X10E3/UL (ref 150–450)
POTASSIUM SERPL-SCNC: 3.9 MMOL/L (ref 3.5–5.2)
PROT SERPL-MCNC: 7 G/DL (ref 6–8.5)
RBC # BLD AUTO: 4.59 X10E6/UL (ref 3.77–5.28)
SODIUM SERPL-SCNC: 142 MMOL/L (ref 134–144)
TRIGL SERPL-MCNC: 60 MG/DL (ref 0–149)
TSH SERPL DL<=0.005 MIU/L-ACNC: 0.77 UIU/ML (ref 0.45–4.5)
VLDLC SERPL CALC-MCNC: 12 MG/DL (ref 5–40)
WBC # BLD AUTO: 6.9 X10E3/UL (ref 3.4–10.8)

## 2020-01-16 DIAGNOSIS — E10.9 TYPE 1 DIABETES MELLITUS WITHOUT COMPLICATION (HCC): ICD-10-CM

## 2020-01-16 NOTE — TELEPHONE ENCOUNTER
[facility]             Medication Refill     Caller (if not patient):       Relationship of caller (if not patient):       Best contact number(s): [phone]       Name of medication and dosage if known: insulin syringe-needle U-100 (INSULIN SYRINGE) 1 mL 29 gauge x 1/2\" syrg and nsulin NPH/insulin regular (NOVOLIN 70/30 U-100 INSULIN) 100 unit/mL (70-30) injection   Pt advised she prefers the U-50 syringe - needles because the U-100 hurt to much           Is patient out of this medication (yes/no):yes       Pharmacy name:[facility]     Pharmacy listed in chart? (yes/no): yes   Pharmacy phone number:       Details to clarify the request:       Shea Szymanski

## 2020-01-19 NOTE — PROGRESS NOTES
Your labs showed normal kidney and liver tests, blood counts, thyroid, cholesterol, vitamin D, and urine protein test.  Keep up the good work!

## 2020-02-06 LAB
25(OH)D3+25(OH)D2 SERPL-MCNC: NORMAL NG/ML
CHOLEST SERPL-MCNC: NORMAL MG/DL
CREAT UR-MCNC: NORMAL MG/DL
GLUCOSE SERPL-MCNC: NORMAL MG/DL
HEMOCCULT STL QL IA: NEGATIVE
TSH SERPL DL<=0.005 MIU/L-ACNC: NORMAL UIU/ML
WBC # BLD AUTO: NORMAL X10E3/UL

## 2020-03-04 DIAGNOSIS — Z79.4 TYPE 2 DIABETES MELLITUS WITHOUT COMPLICATION, WITH LONG-TERM CURRENT USE OF INSULIN (HCC): ICD-10-CM

## 2020-03-04 DIAGNOSIS — E11.9 TYPE 2 DIABETES MELLITUS WITHOUT COMPLICATION, WITH LONG-TERM CURRENT USE OF INSULIN (HCC): ICD-10-CM

## 2020-03-04 RX ORDER — BLOOD SUGAR DIAGNOSTIC
STRIP MISCELLANEOUS
Qty: 300 STRIP | Refills: 3 | Status: SHIPPED | OUTPATIENT
Start: 2020-03-04 | End: 2020-03-05 | Stop reason: SDUPTHER

## 2020-03-05 ENCOUNTER — TELEPHONE (OUTPATIENT)
Dept: INTERNAL MEDICINE CLINIC | Facility: CLINIC | Age: 70
End: 2020-03-05

## 2020-03-05 DIAGNOSIS — E11.9 TYPE 2 DIABETES MELLITUS WITHOUT COMPLICATION, WITH LONG-TERM CURRENT USE OF INSULIN (HCC): ICD-10-CM

## 2020-03-05 DIAGNOSIS — Z79.4 TYPE 2 DIABETES MELLITUS WITHOUT COMPLICATION, WITH LONG-TERM CURRENT USE OF INSULIN (HCC): ICD-10-CM

## 2020-03-05 RX ORDER — BLOOD SUGAR DIAGNOSTIC
STRIP MISCELLANEOUS
Qty: 300 STRIP | Refills: 3 | Status: SHIPPED | OUTPATIENT
Start: 2020-03-05 | End: 2020-10-21

## 2020-03-05 NOTE — TELEPHONE ENCOUNTER
[facility] pharmacy on file sent a fax stating that the rx they received for \"accu-chek don plus test strip\" was incomplete. Request states they need a medicare code. Please refax order to [phone].

## 2020-03-11 NOTE — TELEPHONE ENCOUNTER
A diagnostic code for Medicare was already included in the prescription (Dx: E10.9, type 1 DM) sent on 3/5/20. Please call them back to clarify what they need.

## 2020-03-24 DIAGNOSIS — F33.1 MODERATE EPISODE OF RECURRENT MAJOR DEPRESSIVE DISORDER (HCC): ICD-10-CM

## 2020-03-24 RX ORDER — BUPROPION HYDROCHLORIDE 150 MG/1
TABLET, EXTENDED RELEASE ORAL
Qty: 60 TAB | Refills: 5 | Status: SHIPPED | OUTPATIENT
Start: 2020-03-24 | End: 2020-11-13

## 2020-04-02 ENCOUNTER — TELEPHONE (OUTPATIENT)
Dept: INTERNAL MEDICINE CLINIC | Facility: CLINIC | Age: 70
End: 2020-04-02

## 2020-04-03 NOTE — TELEPHONE ENCOUNTER
Tell her that Dr. Clair Lua says it is fine for her to take melatonin for sleep. Start with a dose of 2-3 mg and take it about 1 hr before bedtime. Increase the dose if needed. The maximum dose is 10 mg nightly.

## 2020-04-03 NOTE — TELEPHONE ENCOUNTER
I called the patient and verified the patient by name and date of birth. I informed the patient of the message from Dr. Az Velazquez. The patient understood and did not have any questions at this time.

## 2020-05-18 RX ORDER — NAPHAZOLINE HYDROCHLORIDE AND POLYETHYLENE GLYCOL 300 .1; 2 MG/ML; MG/ML
SOLUTION/ DROPS OPHTHALMIC
Qty: 200 SYRINGE | Refills: 3 | Status: SHIPPED | OUTPATIENT
Start: 2020-05-18 | End: 2020-10-21

## 2020-05-18 NOTE — TELEPHONE ENCOUNTER
REFILL     PCP: Allan Estrada MD     Last appt: 1/14/2020     Future Appointments   Date Time Provider Adry Vital   5/27/2020  9:50 AM Allan Estrada MD [facility] [place]          Requested Prescriptions     Pending Prescriptions Disp Refills    Insulin Syringe-Needle U-100 (Insulin Syringe) 0.5 mL 29 gauge x 1/2\" syrg 200 Syringe 3     Sig: Use to check home blood sugars 2 times a day.  CDx: E10.9

## 2020-05-18 NOTE — TELEPHONE ENCOUNTER
[facility] on file sent a fax requesting a new rx for insulin syringes, fax states pt uses 37 units and would like this size.

## 2020-05-20 ENCOUNTER — TELEPHONE (OUTPATIENT)
Dept: INTERNAL MEDICINE CLINIC | Facility: CLINIC | Age: 70
End: 2020-05-20

## 2020-05-20 DIAGNOSIS — E10.9 TYPE 1 DIABETES MELLITUS WITHOUT COMPLICATION (HCC): ICD-10-CM

## 2020-05-20 DIAGNOSIS — E11.9 TYPE 2 DIABETES MELLITUS WITHOUT COMPLICATION, WITH LONG-TERM CURRENT USE OF INSULIN (HCC): ICD-10-CM

## 2020-05-20 DIAGNOSIS — Z79.4 TYPE 2 DIABETES MELLITUS WITHOUT COMPLICATION, WITH LONG-TERM CURRENT USE OF INSULIN (HCC): ICD-10-CM

## 2020-05-20 RX ORDER — BLOOD SUGAR DIAGNOSTIC
STRIP MISCELLANEOUS
Qty: 300 STRIP | Refills: 3 | Status: CANCELLED | OUTPATIENT
Start: 2020-05-20

## 2020-05-20 RX ORDER — NAPHAZOLINE HYDROCHLORIDE AND POLYETHYLENE GLYCOL 300 .1; 2 MG/ML; MG/ML
SOLUTION/ DROPS OPHTHALMIC
Qty: 200 SYRINGE | Refills: 3 | Status: CANCELLED | OUTPATIENT
Start: 2020-05-20

## 2020-05-20 RX ORDER — LANCETS
EACH MISCELLANEOUS
Qty: 400 EACH | Refills: 3 | Status: SHIPPED | OUTPATIENT
Start: 2020-05-20 | End: 2020-10-21

## 2020-05-20 NOTE — TELEPHONE ENCOUNTER
REFILL     PCP: Nicole Chirinos MD     Last appt: 1/14/2020     Future Appointments   Date Time Provider Adry Vital   5/27/2020  9:50 AM Nicole Chirinos MD [facility]          Requested Prescriptions     Pending Prescriptions Disp Refills    Insulin Syringe-Needle U-100 (Insulin Syringe) 0.5 mL 29 gauge x 1/2\" syrg 200 Syringe 3     Sig: Use to inject insulin twice a day Dx: E10.9    Accu-Chek Jaja Plus test strp strip 300 Strip 3     Sig: Use to test blood sugars 3 times a day.  Dx: E10.9

## 2020-05-20 NOTE — TELEPHONE ENCOUNTER
Previous encounter addressed syringes and test strips.    REFILL     PCP: Cayla Farooq MD     Last appt: 1/14/2020   Future Appointments   Date Time Provider Adry Zahraa   5/27/2020  9:50 AM Cayla Farooq MD [facility]        Requested Prescriptions      No prescriptions requested or ordered in this encounter

## 2020-05-20 NOTE — TELEPHONE ENCOUNTER
[facility]) sent a fax requesting clarification on a recent order for insulin syringe U-100 w/ needle 0.5ml 29     Fax states \"if she is checking blood sugar we need lancets. If this is to inject insulin we need the script to say that\"     Please fax over clarifying rx to [phone].

## 2020-05-22 RX ORDER — SYRINGE-NEEDLE,INSULIN,0.5 ML 30 G X1/2"
SYRINGE, EMPTY DISPOSABLE MISCELLANEOUS
Qty: 200 SYRINGE | Refills: 5 | Status: SHIPPED | OUTPATIENT
Start: 2020-05-22 | End: 2020-10-21

## 2020-05-22 NOTE — TELEPHONE ENCOUNTER
[facility] sent a fax stated that they need a new rx for 0.5mL insulin syringes. She uses 37 units and would like this size.

## 2020-06-23 DIAGNOSIS — E78.2 MIXED HYPERLIPIDEMIA: ICD-10-CM

## 2020-06-24 RX ORDER — ROSUVASTATIN CALCIUM 20 MG/1
TABLET, COATED ORAL
Qty: 90 TAB | Refills: 3 | Status: SHIPPED | OUTPATIENT
Start: 2020-06-24 | End: 2020-10-21

## 2020-07-15 DIAGNOSIS — M15.9 PRIMARY OSTEOARTHRITIS INVOLVING MULTIPLE JOINTS: ICD-10-CM

## 2020-07-15 RX ORDER — DICLOFENAC SODIUM 10 MG/G
2 GEL TOPICAL 4 TIMES DAILY
Qty: 100 G | Refills: 3 | Status: SHIPPED | OUTPATIENT
Start: 2020-07-15 | End: 2021-02-23 | Stop reason: SDUPTHER

## 2020-09-14 DIAGNOSIS — E10.9 TYPE 1 DIABETES MELLITUS WITHOUT COMPLICATION (HCC): ICD-10-CM

## 2020-09-14 NOTE — TELEPHONE ENCOUNTER
Pt called and stated that she spoke to her insurance company [facility]) yesterday and was told that she needs to get a \"formulary exception\" from her PCP for her insulin. She believes the rx needs to be changed. Please give pt a call back at [phone] to discuss.

## 2020-09-15 NOTE — TELEPHONE ENCOUNTER
[facility]. PCP: Amy Espinoza MD     Last appt: 2020   Future Appointments   Date Time Provider Adry Vital   10/21/2020  2:00 PM Lenin Stiles MD [facility] BS AMB        Requested Prescriptions     Pending Prescriptions Disp Refills    insulin NPH/insulin regular (NovoLIN 70/30 U-100 Insulin) 100 unit/mL (70-30) injection 50 mL 2     Si units before breakfast and 16 units before dinner. Dx: E10.9.

## 2020-09-23 ENCOUNTER — TELEPHONE (OUTPATIENT)
Dept: INTERNAL MEDICINE CLINIC | Age: 70
End: 2020-09-23

## 2020-09-23 NOTE — TELEPHONE ENCOUNTER
Pt called back to follow up on a call from 9/14/20-see previous note. Please give pt a call back at [phone] to discuss, VM okay. Pt stated the rx that was sent to the [facility] on file on 9/15/20 is not the correct type, and that her insurance company is telling her she needs to have something different in order for it to be covered.

## 2020-10-06 ENCOUNTER — TELEPHONE (OUTPATIENT)
Dept: INTERNAL MEDICINE CLINIC | Age: 70
End: 2020-10-06

## 2020-10-06 DIAGNOSIS — E10.9 TYPE 1 DIABETES MELLITUS WITHOUT COMPLICATION (HCC): Primary | ICD-10-CM

## 2020-10-07 NOTE — TELEPHONE ENCOUNTER
I called the patient and verified them by name and date of birth. I informed the patient on the message from the provider. They stated understanding and had no further questions.

## 2020-10-21 ENCOUNTER — OFFICE VISIT (OUTPATIENT)
Dept: INTERNAL MEDICINE CLINIC | Age: 70
End: 2020-10-21
Payer: MEDICARE

## 2020-10-21 VITALS
OXYGEN SATURATION: 98 % | DIASTOLIC BLOOD PRESSURE: 71 MMHG | SYSTOLIC BLOOD PRESSURE: 108 MMHG | HEART RATE: 79 BPM | BODY MASS INDEX: 29.43 KG/M2 | WEIGHT: 140.2 LBS | RESPIRATION RATE: 16 BRPM | HEIGHT: 58 IN | TEMPERATURE: 98.2 F

## 2020-10-21 DIAGNOSIS — Z71.89 ADVANCE CARE PLANNING: ICD-10-CM

## 2020-10-21 DIAGNOSIS — Z23 ENCOUNTER FOR IMMUNIZATION: ICD-10-CM

## 2020-10-21 DIAGNOSIS — F33.0 MILD EPISODE OF RECURRENT MAJOR DEPRESSIVE DISORDER (HCC): ICD-10-CM

## 2020-10-21 DIAGNOSIS — I10 ESSENTIAL HYPERTENSION: ICD-10-CM

## 2020-10-21 DIAGNOSIS — M15.9 PRIMARY OSTEOARTHRITIS INVOLVING MULTIPLE JOINTS: ICD-10-CM

## 2020-10-21 DIAGNOSIS — E10.9 TYPE 1 DIABETES MELLITUS WITHOUT COMPLICATION (HCC): ICD-10-CM

## 2020-10-21 DIAGNOSIS — Z00.00 MEDICARE ANNUAL WELLNESS VISIT, SUBSEQUENT: Primary | ICD-10-CM

## 2020-10-21 DIAGNOSIS — Z12.31 ENCOUNTER FOR SCREENING MAMMOGRAM FOR MALIGNANT NEOPLASM OF BREAST: ICD-10-CM

## 2020-10-21 DIAGNOSIS — E78.2 MIXED HYPERLIPIDEMIA: ICD-10-CM

## 2020-10-21 LAB — HBA1C MFR BLD HPLC: 9 %

## 2020-10-21 PROCEDURE — 3017F COLORECTAL CA SCREEN DOC REV: CPT | Performed by: INTERNAL MEDICINE

## 2020-10-21 PROCEDURE — G9717 DOC PT DX DEP/BP F/U NT REQ: HCPCS | Performed by: INTERNAL MEDICINE

## 2020-10-21 PROCEDURE — G0439 PPPS, SUBSEQ VISIT: HCPCS | Performed by: INTERNAL MEDICINE

## 2020-10-21 PROCEDURE — G8427 DOCREV CUR MEDS BY ELIG CLIN: HCPCS | Performed by: INTERNAL MEDICINE

## 2020-10-21 PROCEDURE — 90732 PPSV23 VACC 2 YRS+ SUBQ/IM: CPT | Performed by: INTERNAL MEDICINE

## 2020-10-21 PROCEDURE — 1100F PTFALLS ASSESS-DOCD GE2>/YR: CPT | Performed by: INTERNAL MEDICINE

## 2020-10-21 PROCEDURE — G8399 PT W/DXA RESULTS DOCUMENT: HCPCS | Performed by: INTERNAL MEDICINE

## 2020-10-21 PROCEDURE — G0009 ADMIN PNEUMOCOCCAL VACCINE: HCPCS | Performed by: INTERNAL MEDICINE

## 2020-10-21 PROCEDURE — G8419 CALC BMI OUT NRM PARAM NOF/U: HCPCS | Performed by: INTERNAL MEDICINE

## 2020-10-21 PROCEDURE — G8752 SYS BP LESS 140: HCPCS | Performed by: INTERNAL MEDICINE

## 2020-10-21 PROCEDURE — G8754 DIAS BP LESS 90: HCPCS | Performed by: INTERNAL MEDICINE

## 2020-10-21 PROCEDURE — 83036 HEMOGLOBIN GLYCOSYLATED A1C: CPT | Performed by: INTERNAL MEDICINE

## 2020-10-21 PROCEDURE — 2022F DILAT RTA XM EVC RTNOPTHY: CPT | Performed by: INTERNAL MEDICINE

## 2020-10-21 PROCEDURE — 3288F FALL RISK ASSESSMENT DOCD: CPT | Performed by: INTERNAL MEDICINE

## 2020-10-21 PROCEDURE — 99215 OFFICE O/P EST HI 40 MIN: CPT | Performed by: INTERNAL MEDICINE

## 2020-10-21 PROCEDURE — G8536 NO DOC ELDER MAL SCRN: HCPCS | Performed by: INTERNAL MEDICINE

## 2020-10-21 PROCEDURE — 1090F PRES/ABSN URINE INCON ASSESS: CPT | Performed by: INTERNAL MEDICINE

## 2020-10-21 PROCEDURE — G9899 SCRN MAM PERF RSLTS DOC: HCPCS | Performed by: INTERNAL MEDICINE

## 2020-10-21 PROCEDURE — 3052F HG A1C>EQUAL 8.0%<EQUAL 9.0%: CPT | Performed by: INTERNAL MEDICINE

## 2020-10-21 RX ORDER — ROSUVASTATIN CALCIUM 20 MG/1
TABLET, COATED ORAL
Qty: 90 TAB | Refills: 3
Start: 2020-10-21 | End: 2021-05-07

## 2020-10-21 NOTE — PROGRESS NOTES
Chief Complaint   Patient presents with    Diabetes     4 month f/u    Depression     PHQ score of 3.  1. Have you been to the ER, urgent care clinic since your last visit? Hospitalized since your last visit? No    2. Have you seen or consulted any other health care providers outside of the [address] [place] [address] since your last visit? Include any pap smears or colon screening. No    Visit Vitals  /71 (BP 1 Location: Left arm, BP Patient Position: Sitting)   Pulse 79   Temp 98.2 °F (36.8 °C) (Oral)   Resp 16   Ht 4' 10\" (1.473 m)   Wt 140 lb 3.2 oz (63.6 kg)   SpO2 98%   BMI 29.30 kg/m²     Administered PPSV23, pt tolerated well.

## 2020-10-21 NOTE — PROGRESS NOTES
This is the Subsequent Medicare Annual Wellness Exam, performed 12 months or more after the Initial AWV or the last Subsequent AWV    I have reviewed the patient's medical history in detail and updated the computerized patient record. History     Alec Reed is a 72 y.o. female. Presents for Medicare AWV and follow up evaluation. Last seen 1/14/20. She has type 1 DM, HTN, hyperlipidemia, chronic pain syndrome, and seasonal allergic rhinitis.       Complains of increased stress. Landlord wants her to make changes to yard around her trailer. Still depressed but a mood generally better on Wellbutrin  mg twice daily.  Denies SI or HI.        Has diffuse pains at joints. ES Tylenol and meloxicam did not help so stopped both. Uses diclofenac gel with some relief.     Endocrine Review  She is seen for diabetes. Last saw Dr. Jasiel Sevilla on 2/6/19. Denies polydipsia, polyuria.  Infrequent hypoglycemia. She takes insulin 70/30 37 units before breakfast and 16 units before dinner.  Home glucose monitoring: is performed regularly.  In Oct, FBS:  and PM BS: 115-320, isolated PM BS 54. She reports medication compliance: compliant all of the time.  Medication side effects: none.  Diabetic diet compliance: noncompliant some of the time.  Lab review: A1c is 9.0% today (10/21/20), was 8.8% on 1/14/20.  Eye exam: UTD, Dr. Nicky Workman on 1/27/20, no diabetic retinopathy.       Cardiovascular Review  The patient has hypertension and hyperlipidemia.  She reports taking medications as instructed, no medication side effects noted.  Diet and Lifestyle: generally follows a low fat low cholesterol diet, generally follows a low sodium diet, no formal exercise but active during the day.     Soc Hx  . Has 1 son and 3 granddaughters. Lives alone in a trailer home. Unemployed. Does not drive. Never smoker. Denies alcohol or recreational drug use.  No formal exercise but stays active.      Health Maintenance  Flu vaccine: declined  Pneumonia vaccine: PCV-13 2/13/19, PPSV-23 10/21/20  Tetanus vaccine: Td 4/13/10                            Zoster vaccine: declined, never had chickenpox  Colonoscopy: FIT 2/3/20, negative  Mammogram: 10/10/18, due for this  Bone density: 8/2/17, osteopenia  Eye exam: 1/27/20, Dr. Justyna Park exam: 1/14/20  Lipids: 1/14/20 (LDL 73)  A1c: 10/21/20 (9.0%)  Urine microalbumin: 2/3/20  Advanced Directives: discussed, received information at a previous visit  End of Life: discussed, received information at a previous visit                                     ROS  A complete review of systems was performed and is negative except for those mentioned in the HPI.     Patient Active Problem List   Diagnosis Code    Type 1 diabetes mellitus without complication ([facility] [address] P98.4    Essential hypertension I10    Mixed hyperlipidemia E78.2    Allergic rhinitis J30.9    Vitamin D deficiency E55.9    Chronic left shoulder pain M25.512, G89.29    Primary osteoarthritis involving multiple joints M89.49    Obesity (BMI 30-39. 9) E66.9    Osteopenia M85.80    Depressed mood R45.89    Chronic pain syndrome G89.4    Traumatic complete tear of right rotator cuff S46.011A     Past Medical History:   Diagnosis Date    Arthritis     Cross-eye     Depression     Diabetes ([facility] [address]     Type 1    Flatulence     Hypercholesterolemia     Hypertension     Menopause     LMP-63years old?  Sinus congestion       Past Surgical History:   Procedure Laterality Date    HX ORTHOPAEDIC  02/28/2019    Dr. Arsen La. Shoulder arthoscopy, rotator cuff repair x 2, subacromial decompression, distal claviculolectomy    HX TUBAL LIGATION  9095-0885    [place] EYE SURG ANT SGMT PROC UNLISTED       Current Outpatient Medications   Medication Sig Dispense Refill    insulin NPH/insulin regular (NOVOLIN 70/30, HUMULIN 70/30) 100 unit/mL (70-30) injection Inject subcutaneously 37 units before breakfast and 16 units before dinner.  Dx: E10.9. 50 mL 2    diclofenac (VOLTAREN) 1 % gel APPLY 2 G TO AFFECTED AREA FOUR (4) TIMES DAILY. DX: M15.0 100 g 3    irbesartan (AVAPRO) 150 mg tablet TAKE 1 TABLET BY MOUTH EVERY DAY 90 Tab 3    Insulin Syringe-Needle U-100 (BD Insulin Syringe Ultra-Fine) 0.5 mL 30 gauge x 1/2\" syrg Use to inject insulin twice daily. Dx: E10.9 200 Syringe 5    Accu-Chek Softclix Lancets misc Use to test blood sugar 3 times per day for Dx: E10.9 400 Each 3    Insulin Syringe-Needle U-100 (Insulin Syringe) 0.5 mL 29 gauge x 1/2\" syrg Use to check home blood sugars 2 times a day. CDx: E10.9 200 Syringe 3    buPROPion SR (WELLBUTRIN SR) 150 mg SR tablet TAKE 1 TABLET BY MOUTH TWICE A DAY (NOTE DOSE INCREASE) 60 Tab 5    ACCU-CHEK CAROL PLUS TEST STRP strip Use to test blood sugars 3 times a day. Dx: E10.9 300 Strip 3    insulin syringe-needle U-100 (INSULIN SYRINGE) 1 mL 29 gauge x 1/2\" syrg Use to check home blood sugars 2 times a day. CDx: E10.9 200 Syringe 3    aspirin delayed-release 81 mg tablet Take 1 Tab by mouth daily. 90 Tab 3    cholecalciferol, vitamin D3, (VITAMIN D3) 2,000 unit tab Take 1,000 Units by mouth.       ACCU-CHEK CAROL PLUS METER misc        Allergies   Allergen Reactions    Compazine [Prochlorperazine Edisylate] Other (comments)     Tongue swelling, drooling    Naprosyn [Naproxen] Other (comments)     Belching,gas,stomach pain    Percocet [Oxycodone-Acetaminophen] Other (comments)     Visual problems/seeings in triplicate per patient       Family History   Problem Relation Age of Onset    Cancer Mother     Diabetes Sister     Heart Disease Sister     Diabetes Brother     Heart Disease Brother     Diabetes Maternal Grandmother     Diabetes Paternal Grandmother      Social History     Tobacco Use    Smoking status: Never Smoker    Smokeless tobacco: Never Used   Substance Use Topics    Alcohol use: No       Depression Risk Factor Screening:     3 most recent PHQ Screens 10/21/2020   Little interest or pleasure in doing things Not at all   Feeling down, depressed, irritable, or hopeless Nearly every day   Total Score PHQ 2 3   Trouble falling or staying asleep, or sleeping too much -   Feeling tired or having little energy -   Poor appetite, weight loss, or overeating -   Feeling bad about yourself - or that you are a failure or have let yourself or your family down -   Trouble concentrating on things such as school, work, reading, or watching TV -   Moving or speaking so slowly that other people could have noticed; or the opposite being so fidgety that others notice -   Thoughts of being better off dead, or hurting yourself in some way -   How difficult have these problems made it for you to do your work, take care of your home and get along with others -       Alcohol Risk Screen   Do you average more than 1 drink per night or more than 7 drinks a week:  No    On any one occasion in the past three months have you have had more than 3 drinks containing alcohol:  No      Functional Ability and Level of Safety:   Hearing: Hearing is good. Activities of Daily Living: The home contains: no safety equipment. Patient does total self care     Ambulation: with no difficulty     Fall Risk:  Fall Risk Assessment, last 12 mths 10/21/2020   Able to walk? Yes   Fall in past 12 months? Yes   Fall with injury? Yes   Number of falls in past 12 months 1   Fall Risk Score 2     Abuse Screen:  Patient is not abused       Cognitive Screening   Has your family/caregiver stated any concerns about your memory: no     Cognitive Screening: normal by exam    Visit Vitals  /71 (BP 1 Location: Left arm, BP Patient Position: Sitting)   Pulse 79   Temp 98.2 °F (36.8 °C) (Oral)   Resp 16   Ht 4' 10\" (1.473 m)   Wt 140 lb 3.2 oz (63.6 kg)   SpO2 98%   BMI 29.30 kg/m²       General: Well-developed and well-nourished, no distress.   HEENT:  Head normocephalic/atraumatic, no scleral icterus  Lungs:  Clear to auscultation bilaterally. Good air movement. Heart:  Regular rate and rhythm, normal S1 and S2, no murmur, gallop, or rub  Extremities: No clubbing, cyanosis, or edema. Neurological: Alert and oriented. Psychiatric: Normal mood and affect. Behavior is normal.     Results for orders placed or performed in visit on 10/21/20   AMB POC HEMOGLOBIN A1C   Result Value Ref Range    Hemoglobin A1c (POC) 9.0 %       Patient Care Team   Patient Care Team:  Alan Nuñez MD as PCP - General (Internal Medicine)  Alan Nuñez MD as PCP - [facility] Empaneled Provider    Assessment/Plan   Education and counseling provided:  Are appropriate based on today's review and evaluation  End-of-Life planning (with patient's consent)  Pneumococcal Vaccine  Screening Mammography    Diagnoses and all orders for this visit:    1. Medicare annual wellness visit, subsequent    2. Type 1 diabetes mellitus without complication (HCC)  Not controlled. A1c 9.0% today. Would like to see Dr. Jey West, a female endocrinologist, that she heard good things about.  -     AMB POC HEMOGLOBIN A1C  -     REFERRAL TO ENDOCRINOLOGY    3. Essential hypertension  Controlled on irbesartan 150 mg daily. 4. Mixed hyperlipidemia  Controlled on Crestor. 5. Primary osteoarthritis involving multiple joints  Controlled on diclofenac gel. 6. Major depression  Continue Wellbutrin 150 mg BID. 7. Encounter for screening mammogram for malignant neoplasm of breast  -     [facility] MAMMO BI SCREENING INCL CAD; Future    8. Advance care planning    9. Encounter for immunization  -     PNEUMOCOCCAL POLYSACCHARIDE VACCINE, 23-VALENT, ADULT OR IMMUNOSUPPRESSED PT DOSE,  -     ADMIN PNEUMOCOCCAL VACCINE      Follow-up and Dispositions    · Return in about 6 months (around 4/21/2021), or if symptoms worsen or fail to improve, for DM, HTN.          Health Maintenance Due   Topic Date Due    Pneumococcal 65+ years (2 of 2 - PPSV23) 02/13/2020    Medicare Yearly Exam  07/31/2020    Flu Vaccine (1) 09/01/2020    Breast Cancer Screen Mammogram  10/10/2020

## 2020-10-21 NOTE — PATIENT INSTRUCTIONS

## 2020-10-22 NOTE — ACP (ADVANCE CARE PLANNING)
Advance Care Planning       Advance Care Planning (ACP) Physician/NP/PA (Provider) Conversation        Date of ACP Conversation: 10/21/2020    Conversation Conducted with:   Patient with Decision Making Liza Phoenix Maker:    Current Designated Health Care Decision Maker:   (If there is a valid [place] named in the [address]" box in the ACP activity, but it is not visible above, be sure to open that field and then select the health care decision maker relationship (ie \"primary\") in the blank space to the right of the name.)    Note: Assess and validate information in current ACP documents, as indicated. If no Authorized Decision Maker has previously been identified, then patient chooses [place]:  \"Who would you like to name as your primary health care decision-maker? \"    Name: Kg Cunningham   Relationship: Ned Phone number: [phone] (mobile)  \"Can this person be reached easily? \" YES  \"Who would you like to name as your back-up decision maker? \"   Name: Kaushik Uriostegui  Relationship: Ramon Phone number: [phone] (mobile)  \"Can this person be reached easily? \" YES    Note: If the relationship of these Decision-Makers to the patient does NOT follow your state's Next of Kin hierarchy, recommend that patient complete ACP document that meets state-specific requirements to allow them to act on the patient's behalf when appropriate. Care Preferences:    Hospitalization: \"If your health worsens and it becomes clear that your chance of recovery is unlikely, what would your preference be regarding hospitalization? \"  If the patient would want hospitalization, answer \"yes\". If the patient would prefer comfort-focused treatment without hospitalization, answer \"no\". yes      Ventilation:   \"If you were in your present state of health and suddenly became very ill and were unable to breathe on your own, what would your preference be about the use of a ventilator (breathing machine) if it was available to you? \"    If patient would desire the use of a ventilator (breathing machine), answer \"yes\", if not answer \"no\":yes    \"If your health worsens and it becomes clear that your chance of recovery is unlikely, what would your preference be about the use of a ventilator (breathing machine) if it was available to you? \"   yes, at least for a week      Resuscitation:  \"CPR works best to restart the heart when there is a sudden event, like a heart attack, in someone who is otherwise healthy. Unfortunately, CPR does not typically restart the heart for people who have serious health conditions or who are very sick. \"    \"In the event your heart stopped as a result of an underlying serious health condition, would you want attempts to be made to restart your heart (answer \"yes\" for attempt to resuscitate) or would you prefer a natural death (answer \"no\" for do not attempt to resuscitate)? \"   yes    NOTE: If the patient has a valid advance directive AND provides care preference(s) that are inconsistent with that prior directive, advise the patient to consider either: creating a new advance directive that complies with state-specific requirements; or, if that is not possible, orally revoking that prior directive in accordance with state-specific requirements, which must be documented in the EHR.     Conversation Outcomes / Follow-Up Plan:   Recommended completion of Advance Directive      Length of Voluntary ACP Conversation in minutes:  <16 minutes (Non-Billable)      Rupert Henley MD

## 2020-12-16 ENCOUNTER — TELEPHONE (OUTPATIENT)
Dept: INTERNAL MEDICINE CLINIC | Age: 70
End: 2020-12-16

## 2020-12-16 NOTE — TELEPHONE ENCOUNTER
pts called requested a New prescription for Humulin 70/30 sent to pharmacy; this is what her insurance will cover

## 2020-12-16 NOTE — TELEPHONE ENCOUNTER
Verified patients name and date of birth. Advised pharmacist at [facility] that patient can have Humulin 70/30 insulin per Dr Isaac uHff as patients insurance prefers this product.

## 2021-01-26 DIAGNOSIS — E11.9 TYPE 2 DIABETES MELLITUS WITHOUT COMPLICATION, WITH LONG-TERM CURRENT USE OF INSULIN (HCC): ICD-10-CM

## 2021-01-26 DIAGNOSIS — Z79.4 TYPE 2 DIABETES MELLITUS WITHOUT COMPLICATION, WITH LONG-TERM CURRENT USE OF INSULIN (HCC): ICD-10-CM

## 2021-01-26 RX ORDER — BLOOD SUGAR DIAGNOSTIC
STRIP MISCELLANEOUS
Qty: 300 STRIP | Refills: 3 | Status: SHIPPED | OUTPATIENT
Start: 2021-01-26 | End: 2022-02-04

## 2021-02-23 DIAGNOSIS — M15.9 PRIMARY OSTEOARTHRITIS INVOLVING MULTIPLE JOINTS: ICD-10-CM

## 2021-02-23 RX ORDER — DICLOFENAC SODIUM 10 MG/G
2 GEL TOPICAL 4 TIMES DAILY
Qty: 100 G | Refills: 3 | Status: SHIPPED | OUTPATIENT
Start: 2021-02-23 | End: 2021-12-09

## 2021-02-23 NOTE — TELEPHONE ENCOUNTER
[facility] a refill of   Requested Prescriptions     Pending Prescriptions Disp Refills    diclofenac (VOLTAREN) 1 % gel 100 g 3     Sig: Apply 2 g to affected area four (4) times daily.  Dx: M15.0

## 2021-02-23 NOTE — TELEPHONE ENCOUNTER
PCP: Lien Barriga MD     Last appt: 10/21/2020   Future Appointments   Date Time Provider Adry Vital   4/21/2021  2:00 PM Lien Barriga MD [facility] BS AMB        Requested Prescriptions     Pending Prescriptions Disp Refills    diclofenac (VOLTAREN) 1 % gel 100 g 3     Sig: Apply 2 g to affected area four (4) times daily.  Dx: M15.0

## 2021-05-07 DIAGNOSIS — E78.2 MIXED HYPERLIPIDEMIA: ICD-10-CM

## 2021-05-07 RX ORDER — ROSUVASTATIN CALCIUM 20 MG/1
TABLET, COATED ORAL
Qty: 90 TAB | Refills: 3 | Status: SHIPPED | OUTPATIENT
Start: 2021-05-07 | End: 2022-04-04

## 2021-05-19 ENCOUNTER — TRANSCRIBE ORDER (OUTPATIENT)
Dept: REGISTRATION | Age: 71
End: 2021-05-19

## 2021-05-19 ENCOUNTER — HOSPITAL ENCOUNTER (OUTPATIENT)
Dept: MAMMOGRAPHY | Age: 71
Discharge: HOME | End: 2021-05-19
Attending: INTERNAL MEDICINE
Payer: MEDICARE

## 2021-05-19 DIAGNOSIS — Z12.31 VISIT FOR SCREENING MAMMOGRAM: Primary | ICD-10-CM

## 2021-05-19 DIAGNOSIS — Z12.31 VISIT FOR SCREENING MAMMOGRAM: ICD-10-CM

## 2021-05-19 PROCEDURE — 77067 SCR MAMMO BI INCL CAD: CPT

## 2021-05-24 DIAGNOSIS — I10 ESSENTIAL HYPERTENSION: ICD-10-CM

## 2021-05-24 RX ORDER — IRBESARTAN 150 MG/1
TABLET ORAL
Qty: 90 TABLET | Refills: 3 | Status: SHIPPED | OUTPATIENT
Start: 2021-05-24 | End: 2022-03-08

## 2021-06-08 ENCOUNTER — OFFICE VISIT (OUTPATIENT)
Dept: INTERNAL MEDICINE CLINIC | Age: 71
End: 2021-06-08
Payer: MEDICARE

## 2021-06-08 VITALS
WEIGHT: 134 LBS | BODY MASS INDEX: 28.13 KG/M2 | OXYGEN SATURATION: 95 % | HEART RATE: 86 BPM | TEMPERATURE: 98.7 F | RESPIRATION RATE: 12 BRPM | DIASTOLIC BLOOD PRESSURE: 74 MMHG | HEIGHT: 58 IN | SYSTOLIC BLOOD PRESSURE: 114 MMHG

## 2021-06-08 DIAGNOSIS — I10 ESSENTIAL HYPERTENSION: ICD-10-CM

## 2021-06-08 DIAGNOSIS — M85.89 OSTEOPENIA OF MULTIPLE SITES: ICD-10-CM

## 2021-06-08 DIAGNOSIS — M25.551 RIGHT HIP PAIN: Primary | ICD-10-CM

## 2021-06-08 DIAGNOSIS — R29.6 REPEATED FALLS: ICD-10-CM

## 2021-06-08 DIAGNOSIS — M54.41 ACUTE RIGHT-SIDED LOW BACK PAIN WITH RIGHT-SIDED SCIATICA: ICD-10-CM

## 2021-06-08 DIAGNOSIS — E10.9 TYPE 1 DIABETES MELLITUS WITHOUT COMPLICATION (HCC): ICD-10-CM

## 2021-06-08 DIAGNOSIS — E78.2 MIXED HYPERLIPIDEMIA: ICD-10-CM

## 2021-06-08 DIAGNOSIS — E55.9 VITAMIN D DEFICIENCY: ICD-10-CM

## 2021-06-08 PROCEDURE — G9717 DOC PT DX DEP/BP F/U NT REQ: HCPCS | Performed by: PHYSICIAN ASSISTANT

## 2021-06-08 PROCEDURE — G9899 SCRN MAM PERF RSLTS DOC: HCPCS | Performed by: PHYSICIAN ASSISTANT

## 2021-06-08 PROCEDURE — G8536 NO DOC ELDER MAL SCRN: HCPCS | Performed by: PHYSICIAN ASSISTANT

## 2021-06-08 PROCEDURE — 3288F FALL RISK ASSESSMENT DOCD: CPT | Performed by: PHYSICIAN ASSISTANT

## 2021-06-08 PROCEDURE — 2022F DILAT RTA XM EVC RTNOPTHY: CPT | Performed by: PHYSICIAN ASSISTANT

## 2021-06-08 PROCEDURE — G8752 SYS BP LESS 140: HCPCS | Performed by: PHYSICIAN ASSISTANT

## 2021-06-08 PROCEDURE — 3046F HEMOGLOBIN A1C LEVEL >9.0%: CPT | Performed by: PHYSICIAN ASSISTANT

## 2021-06-08 PROCEDURE — 3017F COLORECTAL CA SCREEN DOC REV: CPT | Performed by: PHYSICIAN ASSISTANT

## 2021-06-08 PROCEDURE — G8399 PT W/DXA RESULTS DOCUMENT: HCPCS | Performed by: PHYSICIAN ASSISTANT

## 2021-06-08 PROCEDURE — 99213 OFFICE O/P EST LOW 20 MIN: CPT | Performed by: PHYSICIAN ASSISTANT

## 2021-06-08 PROCEDURE — G8754 DIAS BP LESS 90: HCPCS | Performed by: PHYSICIAN ASSISTANT

## 2021-06-08 PROCEDURE — 1100F PTFALLS ASSESS-DOCD GE2>/YR: CPT | Performed by: PHYSICIAN ASSISTANT

## 2021-06-08 PROCEDURE — 1090F PRES/ABSN URINE INCON ASSESS: CPT | Performed by: PHYSICIAN ASSISTANT

## 2021-06-08 PROCEDURE — G8419 CALC BMI OUT NRM PARAM NOF/U: HCPCS | Performed by: PHYSICIAN ASSISTANT

## 2021-06-08 PROCEDURE — G8427 DOCREV CUR MEDS BY ELIG CLIN: HCPCS | Performed by: PHYSICIAN ASSISTANT

## 2021-06-08 RX ORDER — DICLOFENAC SODIUM 75 MG/1
75 TABLET, DELAYED RELEASE ORAL 2 TIMES DAILY
Qty: 20 TABLET | Refills: 0 | Status: SHIPPED | OUTPATIENT
Start: 2021-06-08 | End: 2021-06-25

## 2021-06-08 NOTE — PROGRESS NOTES
Araseli Weston  Identified pt with two pt identifiers(name and ). Chief Complaint   Patient presents with    Leg Pain     right       Reviewed record In preparation for visit and have obtained necessary documentation. 1. Have you been to the ER, urgent care clinic or hospitalized since your last visit? No     2. Have you seen or consulted any other health care providers outside of the [address] [place] [address] since your last visit? Include any pap smears or colon screening. No    Vitals reviewed with provider.     Health Maintenance reviewed:     Health Maintenance Due   Topic    Foot Exam Q1     A1C test (Diabetic or Prediabetic)     Colorectal Cancer Screening Combo     MICROALBUMIN Q1     Lipid Screen           Wt Readings from Last 3 Encounters:   21 134 lb (60.8 kg)   10/21/20 140 lb 3.2 oz (63.6 kg)   20 138 lb 12.8 oz (63 kg)        Temp Readings from Last 3 Encounters:   21 98.7 °F (37.1 °C) (Oral)   10/21/20 98.2 °F (36.8 °C) (Oral)   20 98.1 °F (36.7 °C) (Oral)        BP Readings from Last 3 Encounters:   21 114/74   10/21/20 108/71   20 127/57        Pulse Readings from Last 3 Encounters:   21 86   10/21/20 79   20 73        Vitals:    21 1545   BP: 114/74   Pulse: 86   Resp: 12   Temp: 98.7 °F (37.1 °C)   TempSrc: Oral   SpO2: 95%   Weight: 134 lb (60.8 kg)   Height: 4' 10\" (1.473 m)   PainSc:   7   PainLoc: Leg          Learning Assessment:   :       Learning Assessment 2017   PRIMARY LEARNER Patient   PRIMARY LANGUAGE ENGLISH   LEARNER PREFERENCE PRIMARY READING   ANSWERED BY patient   RELATIONSHIP SELF        Depression Screening:   :       3 most recent PHQ Screens 2021   PHQ Not Done Active Diagnosis of Depression or Bipolar Disorder   Little interest or pleasure in doing things -   Feeling down, depressed, irritable, or hopeless -   Total Score PHQ 2 -   Trouble falling or staying asleep, or sleeping too much - Feeling tired or having little energy -   Poor appetite, weight loss, or overeating -   Feeling bad about yourself - or that you are a failure or have let yourself or your family down -   Trouble concentrating on things such as school, work, reading, or watching TV -   Moving or speaking so slowly that other people could have noticed; or the opposite being so fidgety that others notice -   Thoughts of being better off dead, or hurting yourself in some way -   How difficult have these problems made it for you to do your work, take care of your home and get along with others -        Fall Risk Assessment:   :       Fall Risk Assessment, last 12 mths 10/21/2020   Able to walk? Yes   Fall in past 12 months? Yes   Number of falls in past 12 months 1   Fall with injury? 1        Abuse Screening:   :       Abuse Screening Questionnaire 10/21/2020 7/31/2019 4/28/2017   Do you ever feel afraid of your partner? N N N   Are you in a relationship with someone who physically or mentally threatens you? N N N   Is it safe for you to go home?  Y Y Y        ADL Screening:   :       ADL Assessment 10/21/2020   Feeding yourself No Help Needed   Getting from bed to chair No Help Needed   Getting dressed No Help Needed   Bathing or showering No Help Needed   Walk across the room (includes cane/walker) No Help Needed   Using the telphone No Help Needed   Taking your medications No Help Needed   Preparing meals No Help Needed   Managing money (expenses/bills) No Help Needed   Moderately strenuous housework (laundry) No Help Needed   Shopping for personal items (toiletries/medicines) No Help Needed   Shopping for groceries No Help Needed   Driving Help Needed   Climbing a flight of stairs No Help Needed   Getting to places beyond walking distances Help Needed

## 2021-06-08 NOTE — PROGRESS NOTES
Venus Kocher is a 72y.o. year old female seen in clinic today for   Chief Complaint   Patient presents with    Leg Pain     right       she is here today to evaluate 4 days of persistent 7/10 \"really bad\" pain in her R low back and hip. She claims it radiates down her R leg from the R hip. She reports no known injury but reports she has had multiple falls over the past several weeks. She states she does not know what she was doing when the pain started, stating she is unsure if she was sitting, standing, laying. She notes she cannot lay in bed due to the pain and must sleep in her love seat. She denies any numbness, weakness or tingling. She has had issues with stress incontinence because of issues getting to the bathroom with pain. Current Outpatient Medications on File Prior to Visit   Medication Sig Dispense Refill    irbesartan (AVAPRO) 150 mg tablet TAKE 1 TABLET BY MOUTH EVERY DAY 90 Tablet 3    rosuvastatin (CRESTOR) 20 mg tablet TAKE 1 TABLET BY MOUTH EVERYDAY AT BEDTIME 90 Tab 3    Accu-Chek Jaja Plus test strp strip USE TO TEST BLOOD SUGARS 3 TIMES A DAY. DX: E10.9 300 Strip 3    buPROPion SR (WELLBUTRIN SR) 150 mg SR tablet TAKE 1 TABLET BY MOUTH TWICE A DAY 60 Tab 5    insulin NPH/insulin regular (NOVOLIN 70/30, HUMULIN 70/30) 100 unit/mL (70-30) injection Inject subcutaneously 37 units before breakfast and 16 units before dinner. Dx: E10.9. 50 mL 2    aspirin delayed-release 81 mg tablet Take 1 Tab by mouth daily. 90 Tab 3    cholecalciferol, vitamin D3, (VITAMIN D3) 2,000 unit tab Take 2,000 Units by mouth.  diclofenac (VOLTAREN) 1 % gel Apply 2 g to affected area four (4) times daily. Dx: M15.0 100 g 3     No current facility-administered medications on file prior to visit.          Allergies   Allergen Reactions    Compazine [Prochlorperazine Edisylate] Other (comments)     Tongue swelling, drooling    Naprosyn [Naproxen] Other (comments)     Belching,gas,stomach pain    Percocet [Oxycodone-Acetaminophen] Other (comments)     Visual problems/seeings in triplicate per patient     Past Medical History:   Diagnosis Date    Arthritis     Cross-eye     Depression     Diabetes ([address]     Type 1    Flatulence     Hypercholesterolemia     Hypertension     Menopause     LMP-63years old?  Sinus congestion       Past Surgical History:   Procedure Laterality Date    HX ORTHOPAEDIC  02/28/2019    Dr. Denny Zaragoza. Shoulder arthoscopy, rotator cuff repair x 2, subacromial decompression, distal claviculolectomy    HX TUBAL LIGATION  5395-5610    FL EYE SURG ANT SGMT PROC UNLISTED          Family History   Problem Relation Age of Onset    Cancer Mother     Diabetes Sister     Heart Disease Sister     Diabetes Brother     Heart Disease Brother     Diabetes Maternal Grandmother     Diabetes Paternal Grandmother         Social History     Socioeconomic History    Marital status:      Spouse name: Not on file    Number of children: Not on file    Years of education: Not on file    Highest education level: Not on file   Occupational History    Not on file   Tobacco Use    Smoking status: Never Smoker    Smokeless tobacco: Never Used   Vaping Use    Vaping Use: Never used   Substance and Sexual Activity    Alcohol use: No    Drug use: No    Sexual activity: Not on file   Other Topics Concern    Not on file   Social History Narrative    Not on file     Social Determinants of Health     Financial Resource Strain:     Difficulty of Paying Living Expenses:    Food Insecurity:     Worried About Running Out of Food in the Last Year:     Ran Out of Food in the Last Year:    Transportation Needs:     Lack of Transportation (Medical):      Lack of Transportation (Non-Medical):    Physical Activity:     Days of Exercise per Week:     Minutes of Exercise per Session:    Stress:     Feeling of Stress :    Social Connections:     Frequency of Communication with Friends and Family:     Frequency of Social Gatherings with Friends and Family:     Attends Zoroastrian Services:     Active Member of Clubs or Organizations:     Attends Club or Organization Meetings:     Marital Status:    Intimate Partner Violence:     Fear of Current or Ex-Partner:     Emotionally Abused:     Physically Abused:     Sexually Abused:            Visit Vitals  /74 (BP 1 Location: Left upper arm, BP Patient Position: Sitting)   Pulse 86   Temp 98.7 °F (37.1 °C) (Oral)   Resp 12   Ht 4' 10\" (1.473 m)   Wt 134 lb (60.8 kg)   SpO2 95%   BMI 28.01 kg/m²       Review of Systems   Constitutional: Negative for chills, fever and weight loss. Gastrointestinal: Negative for constipation and diarrhea. Genitourinary: Negative for dysuria, flank pain, frequency, hematuria and urgency. Musculoskeletal: Positive for back pain, joint pain and myalgias. Negative for neck pain. Skin: Negative for rash. Neurological: Negative for tingling, sensory change and weakness. Physical Exam  Constitutional:       Appearance: Normal appearance. HENT:      Head: Normocephalic and atraumatic. Right Ear: External ear normal.      Left Ear: External ear normal.      Nose: Nose normal.      Mouth/Throat:      Mouth: Mucous membranes are moist.   Eyes:      Conjunctiva/sclera: Conjunctivae normal.   Cardiovascular:      Rate and Rhythm: Normal rate and regular rhythm. Pulses: Normal pulses. Heart sounds: Normal heart sounds. Pulmonary:      Effort: Pulmonary effort is normal.      Breath sounds: Normal breath sounds. No stridor. No wheezing, rhonchi or rales. Musculoskeletal:         General: No deformity. Cervical back: Normal range of motion and neck supple. Back:       Right hip: Tenderness (lateral R hip tenderness. ROM limited due to pain. No bony tenderness) present. Right knee: Normal. No swelling or bony tenderness. Normal range of motion. No tenderness.       Right lower leg: No bony tenderness. No edema. Left lower leg: No edema. Right ankle: Normal.      Comments: BL feet which chronic arthritis changes. Neurological:      General: No focal deficit present. Mental Status: She is alert. Psychiatric:         Mood and Affect: Mood normal.         Behavior: Behavior normal.          No results found for this or any previous visit (from the past 24 hour(s)). ASSESSMENT AND PLAN  Diagnoses and all orders for this visit:    1. Right hip pain  -     XR HIP RT W OR WO PELV 2-3 VWS; Future  Eval with XR and treat with RICE therapy along with diclofenac. Hx of naproxen issues with likely gastritis. Advised to take only with food and use Pepcid as needed if symptoms develop. Likely pain due to falls and sciatica resulting. May need PT. 2. Acute right-sided low back pain with right-sided sciatica  -     XR SPINE LUMB 2 OR 3 V; Future    3. Essential hypertension  -     METABOLIC PANEL, COMPREHENSIVE; Future  -     CBC WITH AUTOMATED DIFF; Future  -     TSH 3RD GENERATION; Future    4. Type 1 diabetes mellitus without complication (HCC)  -     HEMOGLOBIN A1C WITH EAG; Future  -     METABOLIC PANEL, COMPREHENSIVE; Future  -     MICROALBUMIN, UR, RAND W/ MICROALB/CREAT RATIO; Future    5. Osteopenia of multiple sites    6. Vitamin D deficiency  -     VITAMIN D, 25 HYDROXY; Future    7. Mixed hyperlipidemia  -     LIPID PANEL; Future    8. Repeated falls    Other orders  -     diclofenac EC (VOLTAREN) 75 mg EC tablet; Take 1 Tablet by mouth two (2) times a day. labs ordered for PCP appt in 2 weeks. I have discussed the diagnosis with the patient and the intended plan as seen in the above orders. Patient is in agreement. The patient has received an after-visit summary and questions were answered concerning future plans. I have discussed medication side effects and warnings with the patient as well.     Dat Torre PA-C

## 2021-06-08 NOTE — PATIENT INSTRUCTIONS
Have labs and Xrays completed at [facility].     Radiology is in [address] [facility] [address]. [facility] is near hospital.    Both are walk ins. Use ice and the new medication for pain. Rest and avoid falls . Try and have grab bars placed in bathroom to help avoid incidents involving falls.

## 2021-06-14 DIAGNOSIS — F33.1 MODERATE EPISODE OF RECURRENT MAJOR DEPRESSIVE DISORDER (HCC): ICD-10-CM

## 2021-06-14 RX ORDER — BUPROPION HYDROCHLORIDE 150 MG/1
TABLET, EXTENDED RELEASE ORAL
Qty: 60 TABLET | Refills: 5 | Status: SHIPPED | OUTPATIENT
Start: 2021-06-14 | End: 2021-07-08 | Stop reason: SDUPTHER

## 2021-06-16 RX ORDER — SYRINGE-NEEDLE,INSULIN,0.5 ML 30 G X1/2"
SYRINGE, EMPTY DISPOSABLE MISCELLANEOUS
Qty: 180 SYRINGE | Refills: 6 | Status: SHIPPED | OUTPATIENT
Start: 2021-06-16 | End: 2022-08-03

## 2021-06-25 ENCOUNTER — OFFICE VISIT (OUTPATIENT)
Dept: INTERNAL MEDICINE CLINIC | Age: 71
End: 2021-06-25
Payer: MEDICARE

## 2021-06-25 VITALS
SYSTOLIC BLOOD PRESSURE: 123 MMHG | TEMPERATURE: 97.8 F | HEART RATE: 85 BPM | HEIGHT: 58 IN | RESPIRATION RATE: 14 BRPM | WEIGHT: 135 LBS | DIASTOLIC BLOOD PRESSURE: 78 MMHG | BODY MASS INDEX: 28.34 KG/M2 | OXYGEN SATURATION: 98 %

## 2021-06-25 DIAGNOSIS — M15.9 PRIMARY OSTEOARTHRITIS INVOLVING MULTIPLE JOINTS: ICD-10-CM

## 2021-06-25 DIAGNOSIS — M20.41 HAMMER TOES OF BOTH FEET: ICD-10-CM

## 2021-06-25 DIAGNOSIS — Z12.11 SCREENING FOR COLON CANCER: ICD-10-CM

## 2021-06-25 DIAGNOSIS — F33.0 MILD EPISODE OF RECURRENT MAJOR DEPRESSIVE DISORDER (HCC): ICD-10-CM

## 2021-06-25 DIAGNOSIS — M20.42 HAMMER TOES OF BOTH FEET: ICD-10-CM

## 2021-06-25 DIAGNOSIS — I10 ESSENTIAL HYPERTENSION: ICD-10-CM

## 2021-06-25 DIAGNOSIS — M25.551 RIGHT HIP PAIN: ICD-10-CM

## 2021-06-25 DIAGNOSIS — E10.42 TYPE 1 DIABETES MELLITUS WITH DIABETIC POLYNEUROPATHY (HCC): Primary | ICD-10-CM

## 2021-06-25 LAB — HBA1C MFR BLD HPLC: 9 %

## 2021-06-25 PROCEDURE — G8427 DOCREV CUR MEDS BY ELIG CLIN: HCPCS | Performed by: INTERNAL MEDICINE

## 2021-06-25 PROCEDURE — G8419 CALC BMI OUT NRM PARAM NOF/U: HCPCS | Performed by: INTERNAL MEDICINE

## 2021-06-25 PROCEDURE — G9899 SCRN MAM PERF RSLTS DOC: HCPCS | Performed by: INTERNAL MEDICINE

## 2021-06-25 PROCEDURE — 1090F PRES/ABSN URINE INCON ASSESS: CPT | Performed by: INTERNAL MEDICINE

## 2021-06-25 PROCEDURE — 99214 OFFICE O/P EST MOD 30 MIN: CPT | Performed by: INTERNAL MEDICINE

## 2021-06-25 PROCEDURE — G8536 NO DOC ELDER MAL SCRN: HCPCS | Performed by: INTERNAL MEDICINE

## 2021-06-25 PROCEDURE — G8399 PT W/DXA RESULTS DOCUMENT: HCPCS | Performed by: INTERNAL MEDICINE

## 2021-06-25 PROCEDURE — 1100F PTFALLS ASSESS-DOCD GE2>/YR: CPT | Performed by: INTERNAL MEDICINE

## 2021-06-25 PROCEDURE — 3288F FALL RISK ASSESSMENT DOCD: CPT | Performed by: INTERNAL MEDICINE

## 2021-06-25 PROCEDURE — G8752 SYS BP LESS 140: HCPCS | Performed by: INTERNAL MEDICINE

## 2021-06-25 PROCEDURE — 83036 HEMOGLOBIN GLYCOSYLATED A1C: CPT | Performed by: INTERNAL MEDICINE

## 2021-06-25 PROCEDURE — G9717 DOC PT DX DEP/BP F/U NT REQ: HCPCS | Performed by: INTERNAL MEDICINE

## 2021-06-25 PROCEDURE — G8754 DIAS BP LESS 90: HCPCS | Performed by: INTERNAL MEDICINE

## 2021-06-25 PROCEDURE — 3017F COLORECTAL CA SCREEN DOC REV: CPT | Performed by: INTERNAL MEDICINE

## 2021-06-25 PROCEDURE — 2022F DILAT RTA XM EVC RTNOPTHY: CPT | Performed by: INTERNAL MEDICINE

## 2021-06-25 PROCEDURE — 3052F HG A1C>EQUAL 8.0%<EQUAL 9.0%: CPT | Performed by: INTERNAL MEDICINE

## 2021-06-25 RX ORDER — DICLOFENAC SODIUM 50 MG/1
50 TABLET, DELAYED RELEASE ORAL
Qty: 60 TABLET | Refills: 0 | Status: SHIPPED | OUTPATIENT
Start: 2021-06-25 | End: 2021-07-27

## 2021-06-25 NOTE — PROGRESS NOTES
Identified pt with two pt identifiers. Reviewed record in preparation for visit and have obtained necessary documentation. All patient medications has been reviewed. Chief Complaint   Patient presents with    Diabetes    Hypertension     Additional information about chief complaint:    Visit Vitals  /78 (BP 1 Location: Left upper arm, BP Patient Position: Sitting, BP Cuff Size: Large adult)   Pulse 85   Temp 97.8 °F (36.6 °C) (Oral)   Resp 14   Ht 4' 10\" (1.473 m)   Wt 135 lb (61.2 kg)   SpO2 98%   BMI 28.22 kg/m²       Health Maintenance Due   Topic    Foot Exam Q1     A1C test (Diabetic or Prediabetic)     Eye Exam Retinal or Dilated     Colorectal Cancer Screening Combo     MICROALBUMIN Q1     Lipid Screen        1. Have you been to the ER, urgent care clinic since your last visit? Hospitalized since your last visit? No   2. Have you seen or consulted any other health care providers outside of the [address] [place] [address] since your last visit? Include any pap smears or colon screening.   Yes mammogram   bdg

## 2021-06-25 NOTE — PROGRESS NOTES
CC:   Chief Complaint   Patient presents with    Diabetes    Hypertension       HISTORY OF PRESENT ILLNESS  Justino Molina is a 72 y.o. female. Presents for follow up evaluation. Last seen by me 8 months ago. She has type 1 DM, HTN, hyperlipidemia, chronic pain syndrome, and seasonal allergic rhinitis. Seen by Vivien Favre PA on 6/8/21 for right hip pain. Diclofenac 75 mg twice daily helped pain but came back after finishing it. Never had right hip and LS spine X-rays done, but says she plans to. She is seen for diabetes.  Used to follow with endocrinologist; last saw Dr. Viola Joshi on 2/6/19. I referred her back but she never scheduled an appointment. Has polyuria and infrequent hypoglycemia. Denies polydipsia. She takes insulin 70/30 37 units before breakfast (but took 35 units for several months) and 16 units before dinner.  Home glucose monitoring: is performed 2-3 time daily. Brought in [address] [place] [address] Alexa, FBS: 103-272 and PM BS (before dinner): 114-354, with one isolated PM BS 56.  She reports medication compliance: compliant all of the time.  Medication side effects: none.  Diabetic diet compliance: noncompliant some of the time.  Lab review: A1c is 9.0% today (6/24/21), was 9.0% on 10/21/20. Eye exam: overdue, last saw Dr. Francis Walker on 1/27/20, no diabetic retinopathy.      The patient has hypertension and hyperlipidemia.  She reports taking medications as instructed, no medication side effects noted.  Diet and Lifestyle: generally follows a low fat low cholesterol diet, generally follows a low sodium diet, no formal exercise but active during the day.     ROS  A complete review of systems was performed and is negative except for those mentioned in the HPI.        Patient Active Problem List   Diagnosis Code    Type 1 diabetes mellitus without complication ([facility] H11.7    Essential hypertension I10    Mixed hyperlipidemia E78.2    Allergic rhinitis J30.9    Vitamin D deficiency E55.9    Chronic left shoulder pain M25.512, G89.29    Primary osteoarthritis involving multiple joints M89.49    Obesity (BMI 30-39. 9) E66.9    Osteopenia M85.80    Mild episode of recurrent major depressive disorder (HCC) F33.0    Chronic pain syndrome G89.4    Traumatic complete tear of right rotator cuff S46.011A    Repeated falls R29.6     Past Medical History:   Diagnosis Date    Arthritis     Cross-eye     Depression     Diabetes ([facility]     Type 1    Flatulence     Hypercholesterolemia     Hypertension     Menopause     LMP-63years old?  Sinus congestion      Allergies   Allergen Reactions    Compazine [Prochlorperazine Edisylate] Other (comments)     Tongue swelling, drooling    Naprosyn [Naproxen] Other (comments)     Belching,gas,stomach pain    Percocet [Oxycodone-Acetaminophen] Other (comments)     Visual problems/seeings in triplicate per patient       Current Outpatient Medications   Medication Sig Dispense Refill    Insulin Syringe-Needle U-100 (BD Insulin Syringe Ultra-Fine) 0.5 mL 30 gauge x 1/2\" syrg USE TO INJECT INSULIN TWICE DAILY. DX: E10.9 180 Syringe 6    buPROPion SR (WELLBUTRIN SR) 150 mg SR tablet TAKE 1 TABLET BY MOUTH TWICE A DAY 60 Tablet 5    irbesartan (AVAPRO) 150 mg tablet TAKE 1 TABLET BY MOUTH EVERY DAY 90 Tablet 3    rosuvastatin (CRESTOR) 20 mg tablet TAKE 1 TABLET BY MOUTH EVERYDAY AT BEDTIME 90 Tab 3    diclofenac (VOLTAREN) 1 % gel Apply 2 g to affected area four (4) times daily. Dx: M15.0 100 g 3    Accu-Chek Jaja Plus test strp strip USE TO TEST BLOOD SUGARS 3 TIMES A DAY. DX: E10.9 300 Strip 3    insulin NPH/insulin regular (NOVOLIN 70/30, HUMULIN 70/30) 100 unit/mL (70-30) injection Inject subcutaneously 37 units before breakfast and 16 units before dinner. Dx: E10.9. 50 mL 2    aspirin delayed-release 81 mg tablet Take 1 Tab by mouth daily. 90 Tab 3    cholecalciferol, vitamin D3, (VITAMIN D3) 2,000 unit tab Take 2,000 Units by mouth. PHYSICAL EXAM  Visit Vitals  /78 (BP 1 Location: Left upper arm, BP Patient Position: Sitting, BP Cuff Size: Large adult)   Pulse 85   Temp 97.8 °F (36.6 °C) (Oral)   Resp 14   Ht 4' 10\" (1.473 m)   Wt 135 lb (61.2 kg)   SpO2 98%   BMI 28.22 kg/m²       General: Well-developed and well-nourished, no distress. HEENT:  Head normocephalic/atraumatic, no scleral icterus  Lungs:  Clear to ausculation bilaterally. Good air movement. Heart:  Regular rate and rhythm, normal S1 and S2, no murmur, gallop, or rub  Extremities: No clubbing, cyanosis, or edema. Neurological: Alert and oriented. Psychiatric: Normal mood and affect. Behavior is normal.   Diabetic foot exam:     Left Foot:   Visual Exam: callous - thick callus at plantar surface, bunion with hammertoes and overlapping toes   Pulse DP: 2+ (normal)   Filament test: reduced sensation    Vibratory sensation: absent      Right Foot:   Visual Exam: callous - thick callus at plantar surface, bunion with hammertoes and overlapping toes   Pulse DP: 2+ (normal)   Filament test: reduced sensation    Vibratory sensation: absent        Results for orders placed or performed in visit on 06/25/21   AMB POC HEMOGLOBIN A1C   Result Value Ref Range    Hemoglobin A1c (POC) 9.0 %         ASSESSMENT AND PLAN    ICD-10-CM ICD-9-CM    1. Type 1 diabetes mellitus with diabetic polyneuropathy (HCC)  E10.42 250.61 AMB POC HEMOGLOBIN A1C     357.2 insulin NPH/insulin regular (NOVOLIN 70/30, HUMULIN 70/30) 100 unit/mL (70-30) injection       DIABETES FOOT EXAM   2. Primary osteoarthritis involving multiple joints  M89.49 715.98 diclofenac EC (VOLTAREN) 50 mg EC tablet   3. Essential hypertension  I10 401.9    4. Right hip pain  M25.551 719.45 diclofenac EC (VOLTAREN) 50 mg EC tablet   5. Mild episode of recurrent major depressive disorder (HCC)  F33.0 296.31    6. Hammer toes of both feet  M20.41 735.4     M20.42     7.  Screening for colon cancer  Z12.11 V76.51 [facility] TEST (FECAL DNA COLORECTAL CANCER SCREENING)     Diagnoses and all orders for this visit:    1. Type 1 diabetes mellitus with diabetic polyneuropathy (HCC)  Uncontrolled. A1c 9.0% today. Dr. Eamon Kearns is no longer with the practice. She still has the referral order I gave her to see another endocrinologist, Dr. Gerhardt Angst. Instructed to schedule an appointment. Increase evening insulin 70/30 from 16 to 18 units. Schedule appointment to see Dr. Delfino Ralph (Podiatry); she still has the referral.  -     AMB POC HEMOGLOBIN A1C  -     Start insulin NPH/insulin regular (NOVOLIN 70/30, HUMULIN 70/30) 100 unit/mL (70-30) injection; Inject subcutaneously 37 units before breakfast and 18 units before dinner. Dx: E10.9.  -      DIABETES FOOT EXAM    2. Primary osteoarthritis involving multiple joints  -     Start diclofenac EC (VOLTAREN) 50 mg EC tablet; Take 1 Tablet by mouth two (2) times daily as needed for Pain. Take with food. 3. Essential hypertension  Controlled. Continue irbesartan. 4. Right hip pain  -     Start diclofenac EC (VOLTAREN) 50 mg EC tablet; Take 1 Tablet by mouth two (2) times daily as needed for Pain. 5. Mild episode of recurrent major depressive disorder (HCC)  Controlled on Wellbutrin. 6. Hammer toes of both feet  Schedule with podiatrist.    7. Screening for colon cancer  -     COLOGUARD TEST (FECAL DNA COLORECTAL CANCER SCREENING)    She was reminded to have labs done at [facility]. Sergio MOTA gave her orders for these on 6/4/21 to do before this appointment. Patient Instructions  1. Have X-rays done. 2. Have labs done at [facility]. 3. Schedule appointment with Dr. Delfino Ralph, the foot doctor, in [facility]. [phone]. If you cannot reach the office, let me know and I will schedule you to see another foot doctor. 4. Schedule appointment with Dr. Gerhardt Angst, the diabetes specialist.  5. Do the Cologuard for colon cancer screening.     Follow-up and Dispositions    · Return in about 3 months (around 9/25/2021), or if symptoms worsen or fail to improve, for DM, HTN. I have discussed the diagnosis with the patient and the intended plan as seen in the above orders. Patient is in agreement. The patient has received an after-visit summary and questions were answered concerning future plans. I have discussed medication side effects and warnings with the patient as well.

## 2021-06-25 NOTE — PATIENT INSTRUCTIONS
Patient Instructions  1. Have X-rays done. 2. Have labs done at [facility]. 3. Schedule appointment with Dr. Tawanna Sales, the foot doctor, in [place]. [phone]. If you cannot reach the office, let me know and I will schedule you to see another foot doctor. 4. Schedule appointment with Dr. Beverley Esteban, the diabetes specialist.  5. Do the Cologuard for colon cancer screening.

## 2021-07-06 ENCOUNTER — TELEPHONE (OUTPATIENT)
Dept: INTERNAL MEDICINE CLINIC | Age: 71
End: 2021-07-06

## 2021-07-06 DIAGNOSIS — E10.42 TYPE 1 DIABETES MELLITUS WITH DIABETIC POLYNEUROPATHY (HCC): Primary | ICD-10-CM

## 2021-07-06 NOTE — TELEPHONE ENCOUNTER
Pt called and stated that Dr. Annelise Hogan does not take her insurance and needs to know who else she can see

## 2021-07-07 LAB — COLOGUARD TEST, EXTERNAL: NEGATIVE

## 2021-07-07 NOTE — TELEPHONE ENCOUNTER
Verified patients name and date of birth. Advised patient and gave contact information to patient. Referral to be mailed.

## 2021-07-07 NOTE — TELEPHONE ENCOUNTER
I have placed a referral for her to see Dr. Radha Ma. She can call [phone] to schedule an appointment. We will mail her the referral order.

## 2021-07-08 DIAGNOSIS — F33.1 MODERATE EPISODE OF RECURRENT MAJOR DEPRESSIVE DISORDER (HCC): ICD-10-CM

## 2021-07-08 RX ORDER — BUPROPION HYDROCHLORIDE 150 MG/1
150 TABLET, EXTENDED RELEASE ORAL 2 TIMES DAILY
Qty: 180 TABLET | Refills: 1 | Status: SHIPPED | OUTPATIENT
Start: 2021-07-08 | End: 2021-11-18

## 2021-07-08 NOTE — TELEPHONE ENCOUNTER
REFILL     PCP: Doran Burkitt, MD     Last appt: 6/25/2021   Future Appointments   Date Time Provider Adry Vital   9/27/2021 11:10 AM Doran Burkitt, MD [facility] BS AMB        Requested Prescriptions     Pending Prescriptions Disp Refills    buPROPion SR (WELLBUTRIN SR) 150 mg SR tablet 180 Tablet      Sig: Take 1 Tablet by mouth two (2) times a day.

## 2021-07-08 NOTE — TELEPHONE ENCOUNTER
[facility] on file sent a fax requesting a 90-day refill of   Requested Prescriptions     Pending Prescriptions Disp Refills    buPROPion SR (WELLBUTRIN SR) 150 mg SR tablet 60 Tablet 5

## 2021-07-15 ENCOUNTER — DOCUMENTATION ONLY (OUTPATIENT)
Dept: INTERNAL MEDICINE CLINIC | Age: 71
End: 2021-07-15

## 2021-07-20 ENCOUNTER — TELEPHONE (OUTPATIENT)
Dept: INTERNAL MEDICINE CLINIC | Age: 71
End: 2021-07-20

## 2021-07-20 NOTE — TELEPHONE ENCOUNTER
Patient returned call. Verified patients name and date of birth. Patient unable to make appt with Dr Benjamin Hein due to insurance. Patient stated she wants a referral for Dr Kyra Velazquez but cannot make an appt with that provider until August or September as provider \"is not there yet\". Advised patient to call regarding referral once she is able to make an appt with Dr Kyra Velazquez. Patient stated understanding.

## 2021-07-20 NOTE — TELEPHONE ENCOUNTER
----- Message from [facility] sent at 7/19/2021  6:44 PM EDT -----  Regarding: Dr. Macy Singleton / Mukesh Carrion Message/Vendor Calls    Caller's first and last name: Josie Holliday      Reason for call: Referral       Callback required yes/no and why: Y       Best contact number(s): [phone]      Details to clarify the request: Pt states she was unable to used the referral sent to her because her insurance is not accepted at that provider. She would like a referral for Dr. Karma Millan.             Courtney Oakes

## 2021-08-04 ENCOUNTER — TELEPHONE (OUTPATIENT)
Dept: PHARMACY | Age: 71
End: 2021-08-04

## 2021-08-04 NOTE — TELEPHONE ENCOUNTER
For Pharmacy [id number] in place: No   Intervention Detail: Adherence Monitorin   Gap Closed?: Yes   Time Spent (min): 15

## 2021-08-04 NOTE — TELEPHONE ENCOUNTER
CLINICAL PHARMACY: ADHERENCE REVIEW  Identified care gap per Janina; fills at [facility]: ACE/ARB and Statin adherence    Last Visit: 6/25/21    ASSESSMENT  ACE/ARB ADHERENCE    Per Insurance Records through [facility]:  IRBESARTAN TAB 150MG last filled on 5/24/21 for 90 day supply. Next refill due: 8/22/21    Per [facility]:   IRBESARTAN TAB 150MG last picked up on 6/8/21 for 90 day supply. 3 refills remaining. Billed through [facility]    BP Readings from Last 3 Encounters:   06/25/21 123/78   06/08/21 114/74   10/21/20 108/71     CrCl cannot be calculated (Patient's most recent lab result is older than the maximum 180 days allowed. ). [address] [facility]    Per Insurance Records through [facility]:  Fail Date: 8/5/21  ROSUVASTATIN TAB 20MG last filled on 3/9/21 for 90 day supply. Next refill due: 6/7/21    Per [facility]:   ROSUVASTATIN TAB 20MG last picked up on 7/22/21 for 90 day supply. 3 refills remaining. Billed through [facility] Value Date/Time    Cholesterol, total CANCELED 02/03/2020 02:16 PM    HDL Cholesterol 63 01/14/2020 04:15 PM    LDL, calculated 73 01/14/2020 04:15 PM    VLDL, calculated 12 01/14/2020 04:15 PM    Triglyceride 60 01/14/2020 04:15 PM    CHOL/HDL Ratio 2.5 04/20/2016 06:45 PM     ALT (SGPT)   Date Value Ref Range Status   01/14/2020 40 (H) 0 - 32 IU/L Final     AST (SGOT)   Date Value Ref Range Status   01/14/2020 33 0 - 40 IU/L Final     The ASCVD Risk score (Kallie Petit., et al., 2013) failed to calculate for the following reasons: The valid total cholesterol range is 130 to 320 mg/dL     PLAN    Patient picked up Rosuvastatin recently and has 3 refills remaining. No outreach planned at this time.      Future Appointments   Date Time Provider Adry Vital   9/27/2021 11:10 AM Fei Lobato MD [facility] BS AMB

## 2021-08-11 ENCOUNTER — TELEPHONE (OUTPATIENT)
Dept: INTERNAL MEDICINE CLINIC | Age: 71
End: 2021-08-11

## 2021-08-11 DIAGNOSIS — E10.42 TYPE 1 DIABETES MELLITUS WITH DIABETIC POLYNEUROPATHY (HCC): Primary | ICD-10-CM

## 2021-08-11 RX ORDER — GABAPENTIN 300 MG/1
300 CAPSULE ORAL 3 TIMES DAILY
Qty: 60 CAPSULE | Refills: 0 | COMMUNITY
Start: 2021-08-11

## 2021-08-11 NOTE — TELEPHONE ENCOUNTER
Patient called and went to see Dr Michael Thorpe her foot Doctor he prescribe Gabapentine and she wants to know is it ok to take  Please give patient a call

## 2021-08-11 NOTE — TELEPHONE ENCOUNTER
Called pt to clarify on dosage and sig for Gabapentin, unable to reach. Left a generic vm for a call back to the office. Will route to PCP in the meantime to see if rx ok to take.

## 2021-08-11 NOTE — TELEPHONE ENCOUNTER
----- Message from Kevin Leonard sent at 8/11/2021  1:54 PM EDT -----  Regarding: Dr Adela Wang  Patient return call    Caller's first and last name and relationship (if not the patient): Pt       Best contact number(s): [phone]      Whose call is being returned: Ez Feng      Details to clarify the request: The Gabapentin is 300 mg twice a day.       Dannial Right

## 2021-08-11 NOTE — TELEPHONE ENCOUNTER
Returned phone call to pt, HIPAA verified by two patient identifiers. Advised that per PCP, it is ok to take Gabapentin. Pt verified understanding.

## 2021-08-11 NOTE — TELEPHONE ENCOUNTER
Let patient know that Dr. Jose Eduardo Frey says it is okay for her to take gabapentin 300 mg twice a day for diabetic neuropathy.

## 2021-08-31 NOTE — MR AVS SNAPSHOT
[address] [facility] [address] [id number] [phone] Patient: Celine Weston MRN: [id number] LEH:[birth year] Visit Information Date & Time Provider Department Dept. Phone Encounter #  
 3/7/2018 11:10 AM Tennille Walsh, [address] [place] [address] Diabetes and Endocrinology [phone] [id number] Follow-up Instructions Return in about 6 weeks (around 4/18/2018). Your Appointments 5/23/2018  9:50 AM  
ESTABLISHED PATIENT with MD Nati Carlwatson [place] [address] [facility]) Appt Note: 3mth f/u; DM, HTN, chronic pain [address] [place] [address] [phone]  
  
   
 [address] [facility] [address] Upcoming Health Maintenance Date Due  
 MEDICARE YEARLY EXAM 4/29/2018 FOBT Q 1 YEAR AGE 50-75 7/6/2018 HEMOGLOBIN A1C Q6M 8/21/2018 FOOT EXAM Q1 8/22/2018 EYE EXAM RETINAL OR DILATED Q1 9/13/2018 Pneumococcal 65+ Low/Medium Risk (2 of 2 - PPSV23) 9/27/2018 MICROALBUMIN Q1 9/27/2018 LIPID PANEL Q1 2/21/2019 BREAST CANCER SCRN MAMMOGRAM 8/2/2019 GLAUCOMA SCREENING Q2Y 9/13/2019 DTaP/Tdap/Td series (2 - Td) 4/27/2027 Allergies as of 3/7/2018  Review Complete On: 3/7/2018 By: Tennille Walsh MD  
  
 Severity Noted Reaction Type Reactions Compazine [Prochlorperazine Edisylate]  04/12/2010    Other (comments) Tongue swelling, drooling Naprosyn [Naproxen]  05/19/2017   Side Effect Other (comments) Belching,gas,stomach pain Percocet [Oxycodone-acetaminophen]  01/18/2018    Other (comments) Visual problems/seeings in triplicate per patient Current Immunizations  Reviewed on 11/1/2017 Name Date  
 TD Vaccine 4/13/2010  2:21 AM  
  
 Not reviewed this visit You Were Diagnosed With   
  
 Codes Comments Type 1 diabetes mellitus without complication (HCC)    -  Primary ICD-10-CM: E10.9 Patient attended Phase 2 Cardiac Rehab Exercise Session. Further documentation will be scanned into the medical record upon discharge.   ICD-9-CM: 250.01 Essential hypertension with goal blood pressure less than 130/80     ICD-10-CM: I10 
ICD-9-CM: 401.9 Hyperlipidemia, unspecified hyperlipidemia type     ICD-10-CM: E78.5 ICD-9-CM: 272.4 Vitals BP Pulse Height(growth percentile) Weight(growth percentile) BMI OB Status 130/82 (BP 1 Location: Left arm, BP Patient Position: Sitting) 66 4' 10\" (1.473 m) 135 lb 4.8 oz (61.4 kg) 28.28 kg/m2 Postmenopausal  
 Smoking Status Never Smoker BMI and BSA Data Body Mass Index Body Surface Area  
 28.28 kg/m 2 1.59 m 2 Preferred Pharmacy Pharmacy Name Phone [id number] [address] [address] [phone] Your Updated Medication List  
  
   
This list is accurate as of 3/7/18 11:40 AM.  Always use your most recent med list.  
  
  
  
  
 [address] Generic drug:  Blood-Glucose Meter ACCU-CHEK CAROL PLUS TEST STRP strip Generic drug:  glucose blood VI test strips Testing blood sugar twice a day Dx E11.9 [facility] Generic drug:  Lancets Use to test blood sugar twice a day for Dx: E11.9  
  
 aspirin 325 mg tablet Commonly known as:  ASPIRIN Take 1 Tab by mouth daily. * B-D INSULIN SYRINGE LO-DOSE  
by Does Not Apply route. Use to inject insulin twice a day for * Insulin Syringe-Needle U-100 1/2 mL 28 gauge x 1/2\" Syrg Commonly known as:  BD LO-DOSE MICRO-FINE IV Use to inject insulin twice daily dx  
  
 diclofenac 1 % Gel Commonly known as:  VOLTAREN Apply 2 g to affected area four (4) times daily. For shoulders, elbows, and knees. ergocalciferol 50,000 unit capsule Commonly known as:  ERGOCALCIFEROL Take 1 Cap by mouth every seven (7) days. fluticasone 50 mcg/actuation nasal spray Commonly known as:  Sherrine Sharon 2 Sprays by Both Nostrils route daily. Indications: Allergic Rhinitis Insulin Syringe-Needle, Dis Un 0.3 mL 30 gauge x 5/16\" Syrg Use to inject insulin twice a day for type 1 diabetes  
  
 losartan 50 mg tablet Commonly known as:  COZAAR  
TAKE 1 TABLET EVERY DAY  Indications: hypertension  
  
 meloxicam 15 mg tablet Commonly known as:  MOBIC Take 15 mg by mouth daily. NovoLIN 70/30 U-100 Insulin 100 unit/mL (70-30) injection Generic drug:  insulin NPH/insulin regular INJECT  20 UNITS SUBCUTANEOUSLY WITH BREAKFAST  AND  16 UNITS WITH DINNER  
  
 rosuvastatin 20 mg tablet Commonly known as:  CRESTOR Take 1 Tab by mouth nightly. Indications: hyperlipidemia  
  
 triamcinolone acetonide 0.1 % topical cream  
Commonly known as:  KENALOG Apply  to affected area two (2) times a day. use thin layer to abdomen. ZyrTEC 10 mg Cap Generic drug:  Cetirizine * Notice: This list has 2 medication(s) that are the same as other medications prescribed for you. Read the directions carefully, and ask your doctor or other care provider to review them with you. Follow-up Instructions Return in about 6 weeks (around 4/18/2018). Patient Instructions Novolin 70/30 insulin:  
 Work days:         24 units breakfast, 10 units with dinner Non-work days:  24 units breakfast, 16 units with dinner 
 
---------------------------------------------------------------------------------------------------------------------- Below you will find a glucose log sheet which you can use to record your blood sugars. Without checking and recording what your home glucose levels are, it will be difficult to make any changes to your medication dose, even when significant changes may be needed. Please feel free to use the log below to record your home glucose levels. At the very least, I would like for you to login the entire 2-3 weeks just before your visit so we can make your visit much more productive and beneficial to you.   
 
GLUCOSE LOG SHEET: 
 
 Date Breakfast Lunch Dinner Bedtime Comments ? GLUCOSE LOG SHEET: 
 
Date Breakfast Lunch Dinner Bedtime Comments ? GLUCOSE LOG SHEET: 
 
Date Breakfast Lunch Dinner Bedtime Comments ? Please provide this summary of care documentation to your next provider. Your primary care clinician is listed as Janina Watson. If you have any questions after today's visit, please call [phone].

## 2021-09-10 ENCOUNTER — HOSPITAL ENCOUNTER (OUTPATIENT)
Dept: GENERAL RADIOLOGY | Age: 71
Discharge: HOME | End: 2021-09-10
Payer: MEDICARE

## 2021-09-10 DIAGNOSIS — M54.41 ACUTE RIGHT-SIDED LOW BACK PAIN WITH RIGHT-SIDED SCIATICA: ICD-10-CM

## 2021-09-10 DIAGNOSIS — M25.551 RIGHT HIP PAIN: ICD-10-CM

## 2021-09-10 PROCEDURE — 72100 X-RAY EXAM L-S SPINE 2/3 VWS: CPT

## 2021-09-10 PROCEDURE — 73502 X-RAY EXAM HIP UNI 2-3 VIEWS: CPT

## 2021-09-11 LAB
25(OH)D3+25(OH)D2 SERPL-MCNC: 34.6 NG/ML (ref 30–100)
ALBUMIN SERPL-MCNC: 4.5 G/DL (ref 3.8–4.8)
ALBUMIN/CREAT UR: 6 MG/G CREAT (ref 0–29)
ALBUMIN/GLOB SERPL: 2 {RATIO} (ref 1.2–2.2)
ALP SERPL-CCNC: 93 IU/L (ref 48–121)
ALT SERPL-CCNC: 35 IU/L (ref 0–32)
AST SERPL-CCNC: 29 IU/L (ref 0–40)
BASOPHILS # BLD AUTO: 0.1 X10E3/UL (ref 0–0.2)
BASOPHILS NFR BLD AUTO: 1 %
BILIRUB SERPL-MCNC: 0.6 MG/DL (ref 0–1.2)
BUN SERPL-MCNC: 24 MG/DL (ref 8–27)
BUN/CREAT SERPL: 25 (ref 12–28)
CALCIUM SERPL-MCNC: 10.6 MG/DL (ref 8.7–10.3)
CHLORIDE SERPL-SCNC: 100 MMOL/L (ref 96–106)
CHOLEST SERPL-MCNC: 163 MG/DL (ref 100–199)
CO2 SERPL-SCNC: 29 MMOL/L (ref 20–29)
CREAT SERPL-MCNC: 0.96 MG/DL (ref 0.57–1)
CREAT UR-MCNC: 129.7 MG/DL
EOSINOPHIL # BLD AUTO: 0.3 X10E3/UL (ref 0–0.4)
EOSINOPHIL NFR BLD AUTO: 4 %
ERYTHROCYTE [DISTWIDTH] IN BLOOD BY AUTOMATED COUNT: 12.9 % (ref 11.7–15.4)
EST. AVERAGE GLUCOSE BLD GHB EST-MCNC: 197 MG/DL
GLOBULIN SER CALC-MCNC: 2.3 G/DL (ref 1.5–4.5)
GLUCOSE SERPL-MCNC: 292 MG/DL (ref 65–99)
HBA1C MFR BLD: 8.5 % (ref 4.8–5.6)
HCT VFR BLD AUTO: 42.4 % (ref 34–46.6)
HDLC SERPL-MCNC: 57 MG/DL
HGB BLD-MCNC: 13.9 G/DL (ref 11.1–15.9)
IMM GRANULOCYTES # BLD AUTO: 0 X10E3/UL (ref 0–0.1)
IMM GRANULOCYTES NFR BLD AUTO: 0 %
LDLC SERPL CALC-MCNC: 80 MG/DL (ref 0–99)
LYMPHOCYTES # BLD AUTO: 2.1 X10E3/UL (ref 0.7–3.1)
LYMPHOCYTES NFR BLD AUTO: 26 %
MCH RBC QN AUTO: 29.8 PG (ref 26.6–33)
MCHC RBC AUTO-ENTMCNC: 32.8 G/DL (ref 31.5–35.7)
MCV RBC AUTO: 91 FL (ref 79–97)
MICROALBUMIN UR-MCNC: 7.7 UG/ML
MONOCYTES # BLD AUTO: 0.8 X10E3/UL (ref 0.1–0.9)
MONOCYTES NFR BLD AUTO: 9 %
NEUTROPHILS # BLD AUTO: 4.8 X10E3/UL (ref 1.4–7)
NEUTROPHILS NFR BLD AUTO: 60 %
PLATELET # BLD AUTO: 222 X10E3/UL (ref 150–450)
POTASSIUM SERPL-SCNC: 5 MMOL/L (ref 3.5–5.2)
PROT SERPL-MCNC: 6.8 G/DL (ref 6–8.5)
RBC # BLD AUTO: 4.66 X10E6/UL (ref 3.77–5.28)
SODIUM SERPL-SCNC: 142 MMOL/L (ref 134–144)
TRIGL SERPL-MCNC: 150 MG/DL (ref 0–149)
TSH SERPL DL<=0.005 MIU/L-ACNC: 1.26 UIU/ML (ref 0.45–4.5)
VLDLC SERPL CALC-MCNC: 26 MG/DL (ref 5–40)
WBC # BLD AUTO: 8.1 X10E3/UL (ref 3.4–10.8)

## 2021-09-13 NOTE — PROGRESS NOTES
Please notify patient:  XR of spine shows arthritis but no other acute changes. Looks like she has some constipation present.     Hip looked normal

## 2021-09-18 NOTE — PROGRESS NOTES
Will discuss results at 9/27/21 appt. A1c 8.5%; was 9.0% on 6/25/21. Gluc 292. Ca mildly high at 10.6. Other labs normal, including kidney and liver tests, blood counts, cholesterol, thyroid, vitamin D, and urine microalbumin. Keep up the good work!

## 2021-09-20 ENCOUNTER — TELEPHONE (OUTPATIENT)
Dept: INTERNAL MEDICINE CLINIC | Age: 71
End: 2021-09-20

## 2021-09-22 ENCOUNTER — TELEPHONE (OUTPATIENT)
Dept: INTERNAL MEDICINE CLINIC | Age: 71
End: 2021-09-22

## 2021-09-22 NOTE — TELEPHONE ENCOUNTER
----- Message from Harish Michele sent at 9/21/2021  5:24 PM EDT -----  Regarding: Dr. Narendra Turner: [phone]  PT called yesterday, 9.20 and today, 9.21 for lab results and pt has not heard back.

## 2021-10-17 DIAGNOSIS — M15.9 PRIMARY OSTEOARTHRITIS INVOLVING MULTIPLE JOINTS: ICD-10-CM

## 2021-10-17 DIAGNOSIS — M25.551 RIGHT HIP PAIN: ICD-10-CM

## 2021-10-17 RX ORDER — DICLOFENAC SODIUM 50 MG/1
50 TABLET, DELAYED RELEASE ORAL
Qty: 60 TABLET | Refills: 2 | Status: SHIPPED | OUTPATIENT
Start: 2021-10-17 | End: 2022-01-04

## 2021-10-20 DIAGNOSIS — E10.42 TYPE 1 DIABETES MELLITUS WITH DIABETIC POLYNEUROPATHY (HCC): ICD-10-CM

## 2021-11-04 ENCOUNTER — OFFICE VISIT (OUTPATIENT)
Dept: INTERNAL MEDICINE CLINIC | Age: 71
End: 2021-11-04
Payer: MEDICARE

## 2021-11-04 VITALS
WEIGHT: 143 LBS | HEIGHT: 58 IN | OXYGEN SATURATION: 98 % | SYSTOLIC BLOOD PRESSURE: 123 MMHG | RESPIRATION RATE: 16 BRPM | HEART RATE: 81 BPM | BODY MASS INDEX: 30.02 KG/M2 | DIASTOLIC BLOOD PRESSURE: 77 MMHG | TEMPERATURE: 98.2 F

## 2021-11-04 DIAGNOSIS — F33.0 MILD EPISODE OF RECURRENT MAJOR DEPRESSIVE DISORDER (HCC): ICD-10-CM

## 2021-11-04 DIAGNOSIS — E10.42 TYPE 1 DIABETES MELLITUS WITH DIABETIC POLYNEUROPATHY (HCC): ICD-10-CM

## 2021-11-04 DIAGNOSIS — Z00.00 MEDICARE ANNUAL WELLNESS VISIT, SUBSEQUENT: Primary | ICD-10-CM

## 2021-11-04 DIAGNOSIS — I10 ESSENTIAL HYPERTENSION: ICD-10-CM

## 2021-11-04 DIAGNOSIS — Z71.89 ADVANCE CARE PLANNING: ICD-10-CM

## 2021-11-04 PROCEDURE — G9717 DOC PT DX DEP/BP F/U NT REQ: HCPCS | Performed by: INTERNAL MEDICINE

## 2021-11-04 PROCEDURE — 3052F HG A1C>EQUAL 8.0%<EQUAL 9.0%: CPT | Performed by: INTERNAL MEDICINE

## 2021-11-04 PROCEDURE — G0439 PPPS, SUBSEQ VISIT: HCPCS | Performed by: INTERNAL MEDICINE

## 2021-11-04 PROCEDURE — G8419 CALC BMI OUT NRM PARAM NOF/U: HCPCS | Performed by: INTERNAL MEDICINE

## 2021-11-04 PROCEDURE — 2022F DILAT RTA XM EVC RTNOPTHY: CPT | Performed by: INTERNAL MEDICINE

## 2021-11-04 PROCEDURE — 1101F PT FALLS ASSESS-DOCD LE1/YR: CPT | Performed by: INTERNAL MEDICINE

## 2021-11-04 PROCEDURE — 1090F PRES/ABSN URINE INCON ASSESS: CPT | Performed by: INTERNAL MEDICINE

## 2021-11-04 PROCEDURE — 3017F COLORECTAL CA SCREEN DOC REV: CPT | Performed by: INTERNAL MEDICINE

## 2021-11-04 PROCEDURE — G8536 NO DOC ELDER MAL SCRN: HCPCS | Performed by: INTERNAL MEDICINE

## 2021-11-04 PROCEDURE — G8752 SYS BP LESS 140: HCPCS | Performed by: INTERNAL MEDICINE

## 2021-11-04 PROCEDURE — G8754 DIAS BP LESS 90: HCPCS | Performed by: INTERNAL MEDICINE

## 2021-11-04 PROCEDURE — G9899 SCRN MAM PERF RSLTS DOC: HCPCS | Performed by: INTERNAL MEDICINE

## 2021-11-04 PROCEDURE — G8399 PT W/DXA RESULTS DOCUMENT: HCPCS | Performed by: INTERNAL MEDICINE

## 2021-11-04 PROCEDURE — 99214 OFFICE O/P EST MOD 30 MIN: CPT | Performed by: INTERNAL MEDICINE

## 2021-11-04 PROCEDURE — G8427 DOCREV CUR MEDS BY ELIG CLIN: HCPCS | Performed by: INTERNAL MEDICINE

## 2021-11-04 NOTE — PATIENT INSTRUCTIONS

## 2021-11-04 NOTE — PROGRESS NOTES
CC:   Chief Complaint   Patient presents with    Diabetes    Hypertension    Annual Wellness Visit       HISTORY OF PRESENT ILLNESS  Ashley Negro is a 80 y.o. female. Presents for Medicare AWV and 4 month follow up evaluation. She has type 1 DM, HTN, hyperlipidemia, chronic pain syndrome, and seasonal allergic rhinitis. No complaints. Denies polydipsia, polyuria, or hypoglycemia. She takes insulin 70/30 37 units before breakfast and 16 units before dinner. Home glucose monitoring: is performed 2-3 time daily. She reports medication compliance: compliant all of the time. Medication side effects: none. Diabetic diet compliance: noncompliant some of the time. Lab review: A1c 8.5% on 9/10/21, was 9.0% on 6/24/21. Eye exam: overdue, last saw Dr. Raiza Davalos on 1/27/20, no diabetic retinopathy. Reports she saw a podiatrist (Dr. Tabitha Brown) since last clinic visit.      Denies HA's, CP, SOB, dizziness, heart palpitations, or leg swelling. Home BP monitoring: not done. She reports taking medications as instructed, no medication side effects noted. Diet and Lifestyle: generally follows a low fat low cholesterol diet, generally follows a low sodium diet, no formal exercise but active during the day. Mild depressed mood. Denies anxiety. Denies SI or HI. See PHQ-9 questionnaire below.       3 most recent PHQ Screens 11/4/2021   PHQ Not Done -   Little interest or pleasure in doing things Not at all   Feeling down, depressed, irritable, or hopeless Nearly every day   Total Score PHQ 2 3   Trouble falling or staying asleep, or sleeping too much More than half the days   Feeling tired or having little energy Several days   Poor appetite, weight loss, or overeating Several days   Feeling bad about yourself - or that you are a failure or have let yourself or your family down Not at all   Trouble concentrating on things such as school, work, reading, or watching TV Not at all   Moving or speaking so slowly that other people could have noticed; or the opposite being so fidgety that others notice Not at all   Thoughts of being better off dead, or hurting yourself in some way Not at all   PHQ 9 Score 7   How difficult have these problems made it for you to do your work, take care of your home and get along with others Somewhat difficult     ROS  A complete review of systems was performed and is negative except for those mentioned in the HPI. Patient Active Problem List   Diagnosis Code    Type 1 diabetes mellitus with diabetic polyneuropathy ([facility] [address] E10.42    Essential hypertension I10    Mixed hyperlipidemia E78.2    Allergic rhinitis J30.9    Vitamin D deficiency E55.9    Chronic left shoulder pain M25.512, G89.29    Primary osteoarthritis involving multiple joints M89.49    Obesity (BMI 30-39. 9) E66.9    Osteopenia M85.80    Mild episode of recurrent major depressive disorder (HCC) F33.0    Chronic pain syndrome G89.4    Traumatic complete tear of right rotator cuff S46.011A    Repeated falls R29.6     Past Medical History:   Diagnosis Date    Arthritis     Cross-eye     Depression     Diabetes ([facility]     Type 1    Flatulence     Hypercholesterolemia     Hypertension     Menopause     LMP-63years old?  Sinus congestion      Allergies   Allergen Reactions    Compazine [Prochlorperazine Edisylate] Other (comments)     Tongue swelling, drooling    Naprosyn [Naproxen] Other (comments)     Belching,gas,stomach pain    Percocet [Oxycodone-Acetaminophen] Other (comments)     Visual problems/seeings in triplicate per patient       Current Outpatient Medications   Medication Sig Dispense Refill    insulin NPH/insulin regular (HumuLIN 70/30 U-100 Insulin) 100 unit/mL (70-30) injection INJECT SUBCUTANEOUSLY 37 UNITS BEFORE BREAKFAST AND 16 UNITS BEFORE DINNER. DX: E10.9. 50 mL 2    diclofenac EC (VOLTAREN) 50 mg EC tablet TAKE 1 TABLET BY MOUTH TWO (2) TIMES DAILY AS NEEDED FOR PAIN.  60 Tablet 2    Accu-Chek Softclix Lancets misc USE TO TEST BLOOD SUGAR 3 TIMES PER DAY FOR DX: E10.9 1 Each 5    gabapentin (NEURONTIN) 300 mg capsule Take 1 Capsule by mouth two (2) times a day. Max Daily Amount: 600 mg. Prescribed by Dr. Theodora Land. 60 Capsule 0    buPROPion SR (WELLBUTRIN SR) 150 mg SR tablet Take 1 Tablet by mouth two (2) times a day. 180 Tablet 1    Insulin Syringe-Needle U-100 (BD Insulin Syringe Ultra-Fine) 0.5 mL 30 gauge x 1/2\" syrg USE TO INJECT INSULIN TWICE DAILY. DX: E10.9 180 Syringe 6    irbesartan (AVAPRO) 150 mg tablet TAKE 1 TABLET BY MOUTH EVERY DAY 90 Tablet 3    rosuvastatin (CRESTOR) 20 mg tablet TAKE 1 TABLET BY MOUTH EVERYDAY AT BEDTIME 90 Tab 3    diclofenac (VOLTAREN) 1 % gel Apply 2 g to affected area four (4) times daily. Dx: M15.0 100 g 3    Accu-Chek Jaja Plus test strp strip USE TO TEST BLOOD SUGARS 3 TIMES A DAY. DX: E10.9 300 Strip 3    aspirin delayed-release 81 mg tablet Take 1 Tab by mouth daily. 90 Tab 3    cholecalciferol, vitamin D3, (VITAMIN D3) 2,000 unit tab Take 2,000 Units by mouth. PHYSICAL EXAM  Visit Vitals  /77 (BP 1 Location: Left upper arm, BP Patient Position: Sitting, BP Cuff Size: Large adult)   Pulse 81   Temp 98.2 °F (36.8 °C) (Oral)   Resp 16   Ht 4' 10\" (1.473 m)   Wt 143 lb (64.9 kg)   SpO2 98%   BMI 29.89 kg/m²   8 lb weight increase since last clinic visit 4 months ago    General: Well-developed and well-nourished, no distress. HEENT:  Head normocephalic/atraumatic, no scleral icterus  Lungs:  Clear to ausculation bilaterally. Good air movement. Heart:  Regular rate and rhythm, normal S1 and S2, no murmur, gallop, or rub  Extremities: No clubbing, cyanosis, or edema. Neurological: Alert and oriented. Psychiatric: Normal mood and affect.  Behavior is normal.     Results for orders placed or performed in visit on 07/15/21   COLOGUARD TEST (FECAL DNA COLORECTAL CANCER SCREENING)   Result Value Ref Range    Cologuard Test, External Negative ASSESSMENT AND PLAN    ICD-10-CM ICD-9-CM    1. Medicare annual wellness visit, subsequent  Z00.00 V70.0    2. Type 1 diabetes mellitus with diabetic polyneuropathy (HCC)  E10.42 250.61 REFERRAL TO OPTOMETRY     357.2    3. Essential hypertension  I10 401.9    4. Mild episode of recurrent major depressive disorder (HCC)  F33.0 296.31    5. Advance care planning  Z71.89 V65.49      Diagnoses and all orders for this visit:    1. Medicare annual wellness visit, subsequent    2. Type 1 diabetes mellitus with diabetic polyneuropathy (HCC)  A1c 8.5%. Not controlled. BS's are highly variable. Keep scheduled appointment with Dr. Jayashree Burden on 12/16/21. Continue gabapentin for polyneuropathy.  -     REFERRAL TO OPTOMETRY (Dr. Quinn Woodruff)    3. Essential hypertension  Controlled. Continue irbesartan. 4. Mild episode of recurrent major depressive disorder (HCC)  Continue Wellbutrin  mg BID. She feels medication is not helping as much as it used to. Will discuss further at next clinic visit. 5. Advance care planning      Follow-up and Dispositions    · Return in about 3 months (around 2/4/2022), or if symptoms worsen or fail to improve, for DM, HTN, depression. I have discussed the diagnosis with the patient and the intended plan as seen in the above orders. Patient is in agreement. The patient has received an after-visit summary and questions were answered concerning future plans. I have discussed medication side effects and warnings with the patient as well.

## 2021-11-04 NOTE — PROGRESS NOTES
Identified pt with two pt identifiers. Reviewed record in preparation for visit and have obtained necessary documentation. All patient medications has been reviewed. Chief Complaint   Patient presents with    Diabetes    Hypertension    Annual Wellness Visit     Additional information about chief complaint:    Visit Vitals  /77 (BP 1 Location: Left upper arm, BP Patient Position: Sitting, BP Cuff Size: Large adult)   Pulse 81   Temp 98.2 °F (36.8 °C) (Oral)   Resp 16   Ht 4' 10\" (1.473 m)   Wt 143 lb (64.9 kg)   SpO2 98%   BMI 29.89 kg/m²       Health Maintenance Due   Topic    Eye Exam Retinal or Dilated     Flu Vaccine (1)    Medicare Yearly Exam        1. Have you been to the ER, urgent care clinic since your last visit? Hospitalized since your last visit? NO     2. Have you seen or consulted any other health care providers outside of the [address] [place] [address] since your last visit? Include any pap smears or colon screening.  NO  bdg

## 2021-11-04 NOTE — PROGRESS NOTES
This is the Subsequent Medicare Annual Wellness Exam, performed 12 months or more after the Initial AWV or the last Subsequent AWV    I have reviewed the patient's medical history in detail and updated the computerized patient record. Assessment/Plan   Education and counseling provided:  Are appropriate based on today's review and evaluation  End-of-Life planning (with patient's consent)  Influenza Vaccine    1. Medicare annual wellness visit, subsequent  2. Type 1 diabetes mellitus with diabetic polyneuropathy (HCC)  -     REFERRAL TO OPTOMETRY  3. Essential hypertension  4. Mild episode of recurrent major depressive disorder ([facility] [address]  5.  Advance care planning       Depression Risk Factor Screening     3 most recent PHQ Screens 11/4/2021   PHQ Not Done -   Little interest or pleasure in doing things Not at all   Feeling down, depressed, irritable, or hopeless Nearly every day   Total Score PHQ 2 3   Trouble falling or staying asleep, or sleeping too much More than half the days   Feeling tired or having little energy Several days   Poor appetite, weight loss, or overeating Several days   Feeling bad about yourself - or that you are a failure or have let yourself or your family down Not at all   Trouble concentrating on things such as school, work, reading, or watching TV Not at all   Moving or speaking so slowly that other people could have noticed; or the opposite being so fidgety that others notice Not at all   Thoughts of being better off dead, or hurting yourself in some way Not at all   PHQ 9 Score 7   How difficult have these problems made it for you to do your work, take care of your home and get along with others Somewhat difficult       Alcohol Risk Screen    Do you average more than 1 drink per night or more than 7 drinks a week:  No    On any one occasion in the past three months have you have had more than 3 drinks containing alcohol:  No        Functional Ability and Level of Safety    Hearing: Hearing is good. Activities of Daily Living: The home contains: no safety equipment. Patient does total self care      Ambulation: with no difficulty     Fall Risk:  Fall Risk Assessment, last 12 mths 11/4/2021   Able to walk? Yes   Fall in past 12 months? 0   Do you feel unsteady? 1   Are you worried about falling 1   Number of falls in past 12 months -   Fall with injury? -      Abuse Screen:  Patient is not abused       Cognitive Screening    Has your family/caregiver stated any concerns about your memory: no     Cognitive Screening: Normal - normal on exam    Health Maintenance Due     Health Maintenance Due   Topic Date Due    Eye Exam Retinal or Dilated  01/27/2021    Medicare Yearly Exam  10/22/2021       Patient Care Team   Patient Care Team:  Jeri Uriostegui MD as PCP - General (Internal Medicine)  Jeri Uriostegui MD as PCP - REHABILITATION [facility] Empaneled Provider    History     Patient Active Problem List   Diagnosis Code    Type 1 diabetes mellitus with diabetic polyneuropathy ([facility] [address] E10.42    Essential hypertension I10    Mixed hyperlipidemia E78.2    Allergic rhinitis J30.9    Vitamin D deficiency E55.9    Chronic left shoulder pain M25.512, G89.29    Primary osteoarthritis involving multiple joints M89.49    Obesity (BMI 30-39. 9) E66.9    Osteopenia M85.80    Mild episode of recurrent major depressive disorder (HCC) F33.0    Chronic pain syndrome G89.4    Traumatic complete tear of right rotator cuff S46.011A    Repeated falls R29.6     Past Medical History:   Diagnosis Date    Arthritis     Cross-eye     Depression     Diabetes ([facility] [address]     Type 1    Flatulence     Hypercholesterolemia     Hypertension     Menopause     LMP-63years old?  Sinus congestion       Past Surgical History:   Procedure Laterality Date    HX ORTHOPAEDIC  02/28/2019    Dr. Henderson Man.  Shoulder arthoscopy, rotator cuff repair x 2, subacromial decompression, distal claviculolectomy    HX TUBAL LIGATION 3150-9520    [place] EYE SURG ANT [facility] PROC UNLISTED       Current Outpatient Medications   Medication Sig Dispense Refill    insulin NPH/insulin regular (HumuLIN 70/30 U-100 Insulin) 100 unit/mL (70-30) injection INJECT SUBCUTANEOUSLY 37 UNITS BEFORE BREAKFAST AND 16 UNITS BEFORE DINNER. DX: E10.9. 50 mL 2    diclofenac EC (VOLTAREN) 50 mg EC tablet TAKE 1 TABLET BY MOUTH TWO (2) TIMES DAILY AS NEEDED FOR PAIN. 60 Tablet 2    Accu-Chek Softclix Lancets misc USE TO TEST BLOOD SUGAR 3 TIMES PER DAY FOR DX: E10.9 1 Each 5    gabapentin (NEURONTIN) 300 mg capsule Take 1 Capsule by mouth two (2) times a day. Max Daily Amount: 600 mg. Prescribed by Dr. Louis Magaña. 60 Capsule 0    buPROPion SR (WELLBUTRIN SR) 150 mg SR tablet Take 1 Tablet by mouth two (2) times a day. 180 Tablet 1    Insulin Syringe-Needle U-100 (BD Insulin Syringe Ultra-Fine) 0.5 mL 30 gauge x 1/2\" syrg USE TO INJECT INSULIN TWICE DAILY. DX: E10.9 180 Syringe 6    irbesartan (AVAPRO) 150 mg tablet TAKE 1 TABLET BY MOUTH EVERY DAY 90 Tablet 3    rosuvastatin (CRESTOR) 20 mg tablet TAKE 1 TABLET BY MOUTH EVERYDAY AT BEDTIME 90 Tab 3    diclofenac (VOLTAREN) 1 % gel Apply 2 g to affected area four (4) times daily. Dx: M15.0 100 g 3    Accu-Chek Jaja Plus test strp strip USE TO TEST BLOOD SUGARS 3 TIMES A DAY. DX: E10.9 300 Strip 3    aspirin delayed-release 81 mg tablet Take 1 Tab by mouth daily. 90 Tab 3    cholecalciferol, vitamin D3, (VITAMIN D3) 2,000 unit tab Take 2,000 Units by mouth.        Allergies   Allergen Reactions    Compazine [Prochlorperazine Edisylate] Other (comments)     Tongue swelling, drooling    Naprosyn [Naproxen] Other (comments)     Belching,gas,stomach pain    Percocet [Oxycodone-Acetaminophen] Other (comments)     Visual problems/seeings in triplicate per patient       Family History   Problem Relation Age of Onset   [facility] Cancer Mother     Diabetes Sister     Heart Disease Sister     Diabetes Brother     Heart Disease Brother     Diabetes Maternal Grandmother     Diabetes Paternal Grandmother      Social History     Tobacco Use    Smoking status: Never Smoker    Smokeless tobacco: Never Used   Substance Use Topics    Alcohol use: No         Perry Fowler MD

## 2021-11-18 DIAGNOSIS — F33.1 MODERATE EPISODE OF RECURRENT MAJOR DEPRESSIVE DISORDER (HCC): ICD-10-CM

## 2021-11-18 RX ORDER — BUPROPION HYDROCHLORIDE 150 MG/1
150 TABLET, EXTENDED RELEASE ORAL 2 TIMES DAILY
Qty: 180 TABLET | Refills: 1 | Status: SHIPPED | OUTPATIENT
Start: 2021-11-18 | End: 2022-04-25

## 2021-12-15 NOTE — PROGRESS NOTES
Chief Complaint   Patient presents with    Diabetes     Former pt of Dr Baron Mandel Follow-up       Patient was last seen: New Patient Visit -Last seen by Dr Gatito Painter 2/19     General:   Type 1 Diabetic diagnosed 1976 near when had oldest child  On insulin since then; though at one point told didn't need  Can also not take 70/30 insulin in AM and not be nathaly high by next dose which is odd  No other insulins except mixed insulin  Currently is free     Depression and stress affect sugars   interested in CGM    A1c: last a1c was 8.5  (9/21)    DM Medications:   Novolin 70/30 insulin: 38 units breakfast, 19 units with dinner (rotates injection sites)                          If her sugar is <100 she can take half the dose,                          If her sugar is < 70 she should hold the dose and     Key Antihyperglycemic Medications             insulin NPH/insulin regular (HumuLIN 70/30 U-100 Insulin) 100 unit/mL (70-30) injection (Taking) INJECT SUBCUTANEOUSLY 37 UNITS BEFORE BREAKFAST AND 16 UNITS BEFORE DINNER. DX: E10.9.           Last Changes: no recent changes    Sugar Checks: checks up to 2 x day     AM: reports:  162, 167, 97, 206, 106, 89, 115, 110, 118 (see scanned list)    PM: reports:  268, 162, 105, 144, 155, 69, 155, 78    LOWs:  has low sugars rarely     DIET: eats what they want, not much education     EXERCISE: exercise limited due to pain     HTN: at goal, on ARB     LIPIDS: at goal, on statin, lipids followed by PCP    RENAL: has normal renal function, has normal FELTON-r , in the past year, is on an ARB     EYES: overdue for eye exam, has no retinopathy     FEET: sees podiatry, has numbness, has tingling at times, has some pain, has hammer toes and bunions     DENTAL:  has no teeth     HEART:  no chest pain, shortness of breath or claudication, has no cardiac history     ASA:  is on aspirin     SYMPTOMS: no polyuria, thirst or blurred vision     THYROID: no known thyroid issue    DIABETES HISTORY: see above    LABS/STUDIES:  Lab Results   Component Value Date/Time    Hemoglobin A1c 8.5 (H) 09/10/2021 02:35 PM    Hemoglobin A1c 8.3 (H) 09/19/2018 10:29 AM    Hemoglobin A1c 9.3 (H) 02/21/2018 11:44 AM    Glucose 292 (H) 09/10/2021 02:35 PM    Glucose  05/23/2018 10:07 AM    GFR est AA 70 09/10/2021 02:35 PM    GFR est non-AA 61 09/10/2021 02:35 PM    Microalbumin/Creat ratio (mg/g creat) 9 02/25/2015 06:30 PM    Microalb/Creat ratio (ug/mg creat.) 6 09/10/2021 02:35 PM    Microalbumin,urine random 0.95 02/25/2015 06:30 PM    Creatinine 0.96 09/10/2021 02:35 PM        Lab Results   Component Value Date/Time    Cholesterol, total 163 09/10/2021 02:35 PM    Triglyceride 150 (H) 09/10/2021 02:35 PM    HDL Cholesterol 57 09/10/2021 02:35 PM    LDL, calculated 80 09/10/2021 02:35 PM    LDL, calculated 73 01/14/2020 04:15 PM          Past Medical History:   Diagnosis Date    Arthritis     Cross-eye     Depression     Diabetes ([facility] [address]     Type 1    Flatulence     Hypercholesterolemia     Hypertension     Menopause     LMP-63years old?  Sinus congestion         Blood pressure 131/78, pulse 80, resp. rate 16, height 4' 10\" (1.473 m), weight 142 lb 3.2 oz (64.5 kg), SpO2 97 %.      Weight Metrics 12/16/2021 11/4/2021 6/25/2021 6/8/2021 10/21/2020 1/14/2020 7/31/2019   Weight 142 lb 3.2 oz 143 lb 135 lb 134 lb 140 lb 3.2 oz 138 lb 12.8 oz 141 lb 9.6 oz   BMI 29.72 kg/m2 29.89 kg/m2 28.22 kg/m2 28.01 kg/m2 29.3 kg/m2 29.01 kg/m2 29.59 kg/m2        EXAM  - GENERAL: NCAT, Appears well nourished   - EYES: EOMI, non-icteric, no proptosis   - Ear/Nose/Throat: NCAT, no visible inflammation or masses   - CARDIOVASCULAR: no cyanosis, no visible JVD   - RESPIRATORY: respiratory effort normal without any distress or labored breathing   - MUSCULOSKELETAL: Normal ROM of neck and upper extremities observed   - SKIN: No rash on face  - NEUROLOGIC:  No facial asymmetry (Cranial nerve 7 motor function), No gaze palsy   - PSYCHIATRIC: Normal affect, Normal insight and judgement    Assessment/Plan:   1. Type 1 diabetes mellitus with hyperglycemia (HCC)  Check c-peptide with a1c - is odd if doesn't take AM dose, is not nathaly high by dinner  70/30 is not a great option for type 1 diabetics - lunch meal is missed coverage and inflexibility to adjust for meals/sugars  Limited options b/c cost.  Though most insulin would be covered by medicaid so possible, but MDI may be too difficult for her to manage  continuous glucose monitor would be very helpful to her, but issues that only on 2 shots per day; but may need additional short acting insulin for scale (vs using 70/30) - so may qualify then  Refer to [facility] for Diabetes Health - limited education so far  Gave libre2 continuous glucose monitor sensor and reader to use for 2 weeks so she can see effect of different foods on sugar and make adjustments to meal plan to get sugars more even on this type of insulin    2. Primary hypertension  On meds per PCP    3. Mixed hyperlipidemia  At goal on statin per PCP    Orders Placed This Encounter    HEMOGLOBIN A1C WITH EAG    C-PEPTIDE    METABOLIC PANEL, BASIC    REFERRAL TO DIABETIC EDUCATION     Referral Priority:   Routine     Referral Type:   Consultation     Referral Reason:   Specialty Services Required     Number of Visits Requested:   1      Follow-up and Dispositions    · Return in about 3 months (around 3/16/2022).

## 2021-12-16 ENCOUNTER — OFFICE VISIT (OUTPATIENT)
Dept: ENDOCRINOLOGY | Age: 71
End: 2021-12-16
Payer: MEDICARE

## 2021-12-16 VITALS
RESPIRATION RATE: 16 BRPM | BODY MASS INDEX: 29.85 KG/M2 | HEIGHT: 58 IN | OXYGEN SATURATION: 97 % | SYSTOLIC BLOOD PRESSURE: 131 MMHG | WEIGHT: 142.2 LBS | HEART RATE: 80 BPM | DIASTOLIC BLOOD PRESSURE: 78 MMHG

## 2021-12-16 DIAGNOSIS — E10.65 TYPE 1 DIABETES MELLITUS WITH HYPERGLYCEMIA (HCC): Primary | ICD-10-CM

## 2021-12-16 DIAGNOSIS — E10.42 TYPE 1 DIABETES MELLITUS WITH DIABETIC POLYNEUROPATHY (HCC): ICD-10-CM

## 2021-12-16 DIAGNOSIS — I10 PRIMARY HYPERTENSION: ICD-10-CM

## 2021-12-16 DIAGNOSIS — E78.2 MIXED HYPERLIPIDEMIA: ICD-10-CM

## 2021-12-16 PROCEDURE — G8399 PT W/DXA RESULTS DOCUMENT: HCPCS | Performed by: INTERNAL MEDICINE

## 2021-12-16 PROCEDURE — 1090F PRES/ABSN URINE INCON ASSESS: CPT | Performed by: INTERNAL MEDICINE

## 2021-12-16 PROCEDURE — 3052F HG A1C>EQUAL 8.0%<EQUAL 9.0%: CPT | Performed by: INTERNAL MEDICINE

## 2021-12-16 PROCEDURE — G9899 SCRN MAM PERF RSLTS DOC: HCPCS | Performed by: INTERNAL MEDICINE

## 2021-12-16 PROCEDURE — G8419 CALC BMI OUT NRM PARAM NOF/U: HCPCS | Performed by: INTERNAL MEDICINE

## 2021-12-16 PROCEDURE — 3017F COLORECTAL CA SCREEN DOC REV: CPT | Performed by: INTERNAL MEDICINE

## 2021-12-16 PROCEDURE — 99204 OFFICE O/P NEW MOD 45 MIN: CPT | Performed by: INTERNAL MEDICINE

## 2021-12-16 PROCEDURE — G8427 DOCREV CUR MEDS BY ELIG CLIN: HCPCS | Performed by: INTERNAL MEDICINE

## 2021-12-16 PROCEDURE — G8752 SYS BP LESS 140: HCPCS | Performed by: INTERNAL MEDICINE

## 2021-12-16 PROCEDURE — G9717 DOC PT DX DEP/BP F/U NT REQ: HCPCS | Performed by: INTERNAL MEDICINE

## 2021-12-16 PROCEDURE — G8536 NO DOC ELDER MAL SCRN: HCPCS | Performed by: INTERNAL MEDICINE

## 2021-12-16 PROCEDURE — 2022F DILAT RTA XM EVC RTNOPTHY: CPT | Performed by: INTERNAL MEDICINE

## 2021-12-16 PROCEDURE — 1101F PT FALLS ASSESS-DOCD LE1/YR: CPT | Performed by: INTERNAL MEDICINE

## 2021-12-16 PROCEDURE — G8754 DIAS BP LESS 90: HCPCS | Performed by: INTERNAL MEDICINE

## 2021-12-16 NOTE — PATIENT INSTRUCTIONS
Freestyle Libre2 continuous glucose monitor sample [place] and Sensor given -- use this to determine what foods affect your sugars the most    Have referred you to [facility] for Diabetes Health    Get lab work - be fasting at least 4 hours

## 2021-12-16 NOTE — LETTER
12/16/2021    Patient: Osie Meckel   YOB: 1950   Date of Visit: 12/16/2021     Tamela Benito MD  [address] [phone]  [address] [facility] [address]    Dear Tamela Benito MD,      Thank you for referring Ms. Cesilia Burrows to [facility] for evaluation. My notes for this consultation are attached. If you have questions, please do not hesitate to call me. I look forward to following your patient along with you.       Sincerely,    Tita Reynolds MD

## 2021-12-22 ENCOUNTER — TELEPHONE (OUTPATIENT)
Dept: ENDOCRINOLOGY | Age: 71
End: 2021-12-22

## 2021-12-22 NOTE — TELEPHONE ENCOUNTER
12/22/2021  3:34 PM      Patient called and needs new sensor for her free style Sadia Pereira - please call her back at [phone] thanks

## 2021-12-22 NOTE — TELEPHONE ENCOUNTER
Spoke with pt and she stated that her son attached the [employer] reader to the upper part of he arm and now the device is requesting a new sensor is required. Pt was told that could stop by the office to  another sensor or she can call the number that's on the back of her reader, and explain what happned so that they can ship to her a new sensor. Pt stated that she did not have a way to the office, so she will call the company.

## 2022-01-04 DIAGNOSIS — M25.551 RIGHT HIP PAIN: ICD-10-CM

## 2022-01-04 DIAGNOSIS — M15.9 PRIMARY OSTEOARTHRITIS INVOLVING MULTIPLE JOINTS: ICD-10-CM

## 2022-01-04 RX ORDER — DICLOFENAC SODIUM 50 MG/1
50 TABLET, DELAYED RELEASE ORAL
Qty: 60 TABLET | Refills: 2 | Status: SHIPPED | OUTPATIENT
Start: 2022-01-04 | End: 2022-04-05

## 2022-01-13 ENCOUNTER — TELEPHONE (OUTPATIENT)
Dept: ENDOCRINOLOGY | Age: 72
End: 2022-01-13

## 2022-01-13 NOTE — TELEPHONE ENCOUNTER
I attempted to return this call and reached a voice mail. i left a message relaying the message from Dr. Basil Zapata and said to give us a call back.   Cory Guzmán

## 2022-01-13 NOTE — TELEPHONE ENCOUNTER
Remind patient to get labs ordered at visit --- in AM fasting --- we want to confirm is a type 1 so critical to get these labs

## 2022-01-17 ENCOUNTER — CLINICAL SUPPORT (OUTPATIENT)
Dept: DIABETES SERVICES | Age: 72
End: 2022-01-17
Payer: MEDICARE

## 2022-01-17 DIAGNOSIS — E10.65 TYPE 1 DIABETES MELLITUS WITH HYPERGLYCEMIA (HCC): Primary | ICD-10-CM

## 2022-01-17 PROCEDURE — G0108 DIAB MANAGE TRN  PER INDIV: HCPCS

## 2022-01-17 NOTE — PROGRESS NOTES
[facility] Program for Diabetes Health  Diabetes Self-Management Education & Support Program    Reason for Referral: Provider encouraged patient to learn more about carbohydrates and Healthy Eating with DM  Referral Source: Omaira Orozco MD  Services requested: DSMES       ASSESSMENT    From my perspective, the participant would benefit from [facility] specifically related to reducing risks, healthy eating, monitoring, taking medications, physical activity, healthy coping and problem solving. Will adapt DSMES program to build on participant's skills score, confidence score and preparedness score as noted in the Diabetes Skills, Confidence, and Preparedness Index. During the program, we will focus on providing DSMES that specifically addresses participant's interest in healthy eating, as shown by their reported readiness to change. The participant would be best served by attending weekly individual sessions, patient uses a ride share service that is first come, first served - reliable transportation to group class is not available    NOTES:  Ms. Karissa Amaya expressed that she is not sure she should be on insulin, after discussion she states \"I guess I need it\" about insulin, we will discuss further. She has not yet had labs drawn for C-peptide, metabolic panel, or T7C. She often takes her breakfast dose of insulin in the afternoon, she states she does not sleep well and when she finally falls asleep she does not wake until the afternoon on some days. She complains of vivid dreams and insomnia. Her main area of interest is nutrition, she would like to lose weight. I have scheduled Ms. Weston for several consecutive Mondays at 1 PM in January and February to always have a spot open for her to be seen for DSMES at [facility], because she cannot prearrange  and drop off with the ride service she uses and may only be able to come intermittently and with very little advance notice.     She states she is ready to make small changes to her meal choices to better control BGs. Diabetes Self-Management Education Follow-up Visit: 1/24/2022       Clinical Presentation  Collette Spence is a 80 y.o. White female referred for diabetes self-management education. Participant has Type 1 DM for 41-50 years. Family history is positive for diabetes. Patient reports they are unsure if they have received  DSMES services in the past. Most recent A1c value:   Lab Results   Component Value Date/Time    Hemoglobin A1c 8.5 (H) 09/10/2021 02:35 PM    Hemoglobin A1c (POC) 9.0 06/25/2021 01:40 PM       Diabetes-related medical history:  Neurological complications: peripheral neuropathy      Diabetes-related medications:  Diabetes Management  Key Antihyperglycemic Medications             insulin NPH/insulin regular (HumuLIN 70/30 U-100 Insulin) 100 unit/mL (70-30) injection INJECT SUBCUTANEOUSLY 38 UNITS BEFORE BREAKFAST AND 19 UNITS BEFORE DINNER. DX: E10.9. Blood Pressure Management  Key ACE/ARB Medications             irbesartan (AVAPRO) 150 mg tablet TAKE 1 TABLET BY MOUTH EVERY DAY          Lipid Management  Key Antihyperlipidemia Meds             rosuvastatin (CRESTOR) 20 mg tablet TAKE 1 TABLET BY MOUTH EVERYDAY AT BEDTIME          Clot Prevention  Key Anti-Platelet Anticoagulant Meds             aspirin delayed-release 81 mg tablet Take 1 Tab by mouth daily. Learning Assessment  Learning objectives Educator assessment (1/17/2022)   Diabetes Disease Process  The participant can   A) describe diabetes in basic terms;   B) state the type of diabetes they have; &   C) state accepted blood glucose targets. Healthy Eating  The participant can   A) identify carbohydrate foods; &   B) accurately read food labels.      Being Active  The participant can  A) state the benefits of physical activity;  B) report their current PA practices;  C) identify PA they would consider incorporating in their lives; &  D) develop an implementation plan. Monitoring  The participant can  A) operate their blood glucose meter; &  B) describe how they log their blood glucoses to share with their provider. Taking Medications  The participant can  A) name their diabetes medications;  B) state the purpose and dose;  C) note side effects; &  D) describe proper storage, disposal & transport (if appropriate). Healthy Coping  The participant can    A) describe their response to diabetes diagnosis; B) describe their specific coping mechanisms;  C) identify supportive people and/or other resources that positively support their diabetes self-care and health. Reducing Risks  The participant can describe the preventive measures used by providers to promote health and prevent diabetes complications. Problem Solving  The participant can   A) identify signs, symptoms & treatment of hypoglycemia;   B) identify signs, symptoms & treatment of hyperglycemia;  C) describe their sick day plan; &  D) identify BG patterns to discuss with their provider.        No  Yes  No        No  No        No  No  Yes  No        Yes  Yes        Yes  No  No  No        Yes  Yes  Yes        No          No  No  No  No     Characteristics to Learning   Barriers to Learning   [] Cognitive loss  [] Mental retardation       [] Psychiatric disorder  [] Visually impaired  [] Hearing loss                 [] Low literacy  [x] Low health literacy  [] Language  [] Functional limitation  [] Pain   [x] Financial  [x] Transportation  [] None   Favorite Ways to Learn   [x] Lecture  [] Slides  [] Reading [] Video-Internet  [] Cassettes/CDs/MP3's  [] Interactive Small Groups [] Other       Behavioral Assessment  Current self-care practices  Educator assessment (1/17/2022)   Healthy Eating   Current practices    24-hour Dietary Recall:  Breakfast: skips, can be eggs, strawberries, water or coffee, or sausage with 1 piece of bread, banana, and water or coffee  Lunch: can be leftovers or \"whatever is in the house\"  Dinner: typically a meat, non-starchy or starchy vegetables, maybe pasta or rice  Snacks: can be sweets, cookies, chips, or crackers  Beverages: water, coffee  Alcohol: none       Would benefit from DSMES related to Healthy Eating: Yes    Eats a carbohydrate controlled diet: No    Stage of change: Preparation      Being Active  Current practices  How many days during the past week have you performed physical activity where your heart beats faster and your breathing is harder than normal for 30 minutes or more?  0 day(s)    How many days in a typical week do you perform activity such as this?  0 day(s)     Would benefit from [facility] related to Being Active: Yes}      Exercises 150 minutes/week: No      Stage of change: Contemplation     Monitoring  Current practices  Do you monitor your blood sugar? Yes    How often do you monitor? 2x/day    What are the range of readings?  mg/dL  Breakfast:  mg/dL      Dinner:  mg/dL      Do you know your last A1c measurement? Yes    Do you know the meaning of the A1c? No     Would benefit from [facility] related to Monitoring: Yes      Uses BG readings to establish trends and understand BG patterns: No      Stage of change: Contemplation       Taking Medication  Current practices  Do you understand what your diabetes medications do? No    How often do you miss doses of your diabetes medications? never    Can you afford your diabetes medications? Yes   Would benefit from [facility] related to Taking Medication: Yes      Takes medications consistently to receive full benefit: Yes      Stage of change: Action       Healthy Coping   Current state  Diabetes Scale:   SCPI: 5.3        Would benefit from [facility] related to Healthy Coping: Yes      Identifies specific people, organizations,etc, that actively support their diabetes self-care efforts: Yes      Stage of change: Preparation     Reducing Risks  Current state  Vaccines:  Influenza:  There is no immunization history for the selected administration types on file for this patient. Pneumococcal:   Immunization History   Administered Date(s) Administered    Pneumococcal Conjugate (PCV-13) 02/13/2019    Pneumococcal Polysaccharide (PPSV-23) 10/21/2020        Hepatitis: There is no immunization history for the selected administration types on file for this patient. Examinations:  Diabetic Foot and Eye Exam HM Status   Topic Date Due    Eye Exam  01/27/2021    Diabetic Foot Care  06/25/2022        Dental exam: dentures, no regular professional oral care    Foot exam: done on: 2/28/2021    Heart Protection:  BP Readings from Last 2 Encounters:   12/16/21 131/78   11/04/21 123/77        Lab Results   Component Value Date/Time    LDL, calculated 80 09/10/2021 02:35 PM    LDL, calculated 73 01/14/2020 04:15 PM        Kidney Protection:  Lab Results   Component Value Date/Time    Microalbumin/Creat ratio (mg/g creat) 9 02/25/2015 06:30 PM    Microalb/Creat ratio (ug/mg creat.) 6 09/10/2021 02:35 PM    Microalbumin,urine random 0.95 02/25/2015 06:30 PM        Would benefit from [facility] related to Reducing Risks:  Yes      Actively participates in decision-making with provider regarding secondary prevention:  No      Stage of change: Preparation   Problem Solving  Current state  Hypoglycemia Management:  What are signs and symptoms of hypoglycemia that you experience: \"I feel different,\" no specific sensations articulated    How do you prevent hypoglycemia: patient is unaware of how to treat low blood sugars    How do you treat hypoglycemia: Patient is unaware of how to treat hypoglycemia    Hyperglycemia Management:  What are signs and symptoms of hyperglycemia that you experience: none    How can you prevent hyperglycemia: patient is unaware of how to prevent high blood sugars    Sick Day Management:  What do you do differently on sick days:  Pt unaware of how to manage sick days with DM    Pattern Management:  Do you notice blood glucose patterns when you look at the readings in your meter or logbook? No    How do you use the blood glucose readings from your meter or logbook? pt is unaware of what impacts BGs     Would benefit from [facility] related to Problem Solving: Yes      Articulates appropriate strategies to address hypoglycemia, hyperglycemia, sick day care and BG pattern: No      Stage of change: Preparation         Note: Content derived from the American Association of Diabetes Educators' Diabetes Education Curriculum: A Guide to Successful Self-Management (2nd edition)      Time in appointment: 61 minutes    Padmaja Stoner RN on 1/17/2022  An electronic signature was used to authenticate this note.

## 2022-01-24 ENCOUNTER — CLINICAL SUPPORT (OUTPATIENT)
Dept: DIABETES SERVICES | Age: 72
End: 2022-01-24
Payer: MEDICARE

## 2022-01-24 DIAGNOSIS — E10.65 TYPE 1 DIABETES MELLITUS WITH HYPERGLYCEMIA (HCC): Primary | ICD-10-CM

## 2022-01-24 PROCEDURE — G0108 DIAB MANAGE TRN  PER INDIV: HCPCS

## 2022-01-24 NOTE — PROGRESS NOTES
[address] [place] [address] Diabetes Health  Diabetes Self-Management Education & Support Program  Encounter note    SUMMARY  Diabetes self-care management training was completed related to Reducing risks. The participant will return on January 31 to continue DSMES related to Healthy eating. The participant did identify SMART Goal(s): - Get U1C, metabolic panel, and C-peptide labs done before 2/7/2022, and will practice knowledge and skills related to reducing risks, monitoring, medications and healthy coping to improve diabetes self-management. EVALUATION:    Fasting BG on 1/24/2022 - 90, Ms. Cha Eduardo did not bring in her BG log today. Ms. Cha Eduardo expressed understanding the basic physiologic defects of T1 and T2DM, the ADA targets for BG/A1c, the role of preventive vaccines and eye/dental/foot exams and care, the relationship between DM and heart/kidney/nerve/and sleep health, and her role in successful DM self-management. Ms. Cha Eduardo states she was not aware she had outstanding labs to be drawn, she is going to try to get this done over the next two weeks but does rely on others for transportation. I explained the purpose of the C-peptide test and that the result would help her providers determine the best ways to manage her DM. Before discussing the physiological defect of T1 versus T2, Ms. Cha Eduardo expressed that she believes she has T1DM, which was caused by taking insulin, after our conversation she expressed understanding the process of both types of DM and is open learning more about how to manage her DM regardless of type. Ms. Cha Eduardo is due for a diabetic eye exam and will see her podiatrist in February 2022. She does not see a dental provider (dentures) and does not practice any daily oral care, she is willing to consider daily mouthwash to help fight bacteria.   She states she has difficulty falling and staying asleep most nights due to frequent urination and stress/depression, she is not sure if she snores or has symptoms of sleep apnea, she is interested in having a sleep study done if it is covered by insurance. Ms. Kathy Mackay would benefit from speaking to Dr. Dylan Armstrong and/or Dr. Essie Elias about a referral for a sleep study to rule out sleep apnea. Next provider visit is scheduled for 1/31/2022 for DSMES       SMART GOAL(S)  1.  Get O5E, metabolic panel, and C-peptide labs done before 2/7/2022  ACHIEVEMENT OF GOAL(S)  [] 0-24%     [] 25-49%     [] 50-74%     [] %  2. ACHIEVEMENT OF GOAL(S)  [] 0-24%     [] 25-49%     [] 50-74%     [] %  3. ACHIEVEMENT OF GOAL(S)  [] 0-24%     [] 25-49%     [] 50-74%     [] %       DATE DSMES TOPIC EVALUATION     1/24/2022 WHAT IS DIABETES?   a. Role of the normal pancreas in energy balance and blood glucose control   b. The defect seen in diabetes   c. Signs & symptoms of diabetes   d. Diagnosis of diabetes   e. Types of diabetes   f. Blood glucose targets in non-pregnant & non-pregnant adults       The participant knows   Their type of diabetes No, Ms. Kathy Mackay states she was told she has T1DM when initially diagnosed in 1975, however she has not yet had the C-Peptide lab to verifty this diagnosis.  The basic physiologic defect Yes, of T1DM and T2DM   Blood glucose targets Yes     DATE DSMES TOPIC EVALUATION     1/24/2022 HOW DO I STAY HEALTHY?   a. Prevention    Vaccinations    Preconception care (if applicable)  b. Examinations    Eye     Dental   Foot   c. Diabetic complications' prevention    Heart health    Kidney Health    Nerve health    Sleep health      The participant has a personal diabetes care record to keep abreast of diabetes health Yes    The participant would benefit from getting the ordered labs done - metabolic, A4U, C-peptide.          Erika Garcia RN on 1/24/2022 at 2:18 PM    I have personally reviewed the health record, including provider notes, laboratory data and current medications before making these care and education recommendations. The time spent in this effort is included in the total time.   Total minutes: 60

## 2022-01-28 NOTE — TELEPHONE ENCOUNTER
The patient called back and verified their name and date of birth. The patient informed me that they do not cover the patient's insulin. They cover Humalin 70-30 and Humalog 75-35. Oral Minoxidil Pregnancy And Lactation Text: This medication should only be used when clearly needed if you are pregnant, attempting to become pregnant or breast feeding.

## 2022-01-31 ENCOUNTER — CLINICAL SUPPORT (OUTPATIENT)
Dept: DIABETES SERVICES | Age: 72
End: 2022-01-31
Payer: MEDICARE

## 2022-01-31 DIAGNOSIS — E10.65 TYPE 1 DIABETES MELLITUS WITH HYPERGLYCEMIA (HCC): Primary | ICD-10-CM

## 2022-01-31 PROCEDURE — G0108 DIAB MANAGE TRN  PER INDIV: HCPCS

## 2022-01-31 NOTE — PROGRESS NOTES
[facility] Program for Diabetes Health  Diabetes Self-Management Education & Support Program  Encounter note    SUMMARY  Diabetes self-care management training was completed related to Healthy eating. The participant will return on February 7 to continue DSMES related to Healthy eating. The participant did identify SMART Goal(s): - Practice building a balanced meal for dinner  at least 2 times over the next week. , and will practice knowledge and skills related to reducing risks, healthy eating and monitoring, medications, healthy coping and problem solving to improve diabetes self-management. EVALUATION:  Over the past 2 weeks, Ms. Weston's fasting BGs have run  and pre-dinner BGs . She has a very irregular sleep cycle, she goes to bed around midnight but often does not sleep until 5 or 6 AM and then waking anytime between 8 AM - 3 PM.  She does eat breakfast when she wakes and takes NPH-Regular 70/30 BID with meals as directed (8-10 hours apart most days), breakfast may or may not include carbs, dinner can be between 7:30-11 PM and may or may not include carbs. She is hypo-unaware (even down to the 40s) and did not have a clear understanding of how to treat hypoglycemia when we initially dicussed it today. She has since expressed understanding of the 15-15 Rule and plans to buy glucose tablets to keep in her bag and knows which fast acting carbs she can use at home. Because of the recurrent hypoglycemia, I think she is a good candidate for the [facility] if T2DM, or perhaps Dexcom if T1DM, if it is covered, the expense is a significant barrier. She is willing to try to establish a bedtime routine and work on resetting her wake/sleep cycle. We discussed sleep hygiene in detail. The goal is to get her schedule a bit more normalized so that she is eating and taking meds at somewhat predictable times and her body is better rested. Today we covered part of Healthy Eating.   She expressed understanding the recommendations to space meals 4-5 hours apart (she typically does), eat breakfast daily, the sources and roles of proteins/fats/carbs/non-starchy vegetables, and the Healthy Plate model to keep carb portions appropriate. We will continue to review each of these topics and concentrate on building balanced meals as we move forward, most of the nutrition  information covered today was new to Ms. Weston. She stated Healthy Plate was a reasonable strategy to try, although she dislikes many non-starchy vegetables. We talked about carb counting, she told me she \"absolutely will not\" count carbs and I agree that Healthy Plate is a better fit for her. Ms. Choco Velarde did not meet last week's SMART Goal of getting her labs run. Her SMART Goal for this week is to try Healthy Plate at least 2 times over the next week. Next week we will practice some hands-on meal building around Healthy Plate. RECOMMENDATIONS:  Ms. Choco Velarde is hypo-unaware with recurrent hypoglycemia, she may be a good candidate for CGM, she drops to the 40s without symptoms. Next provider visit is scheduled for 2/4/2022 with Dr. Galindo Linda, 2/7/2022 with me for DSMES       SMART GOAL(S)  1.  Get L5F, metabolic panel, and C-peptide labs done before 2/7/2022  ACHIEVEMENT OF GOAL(S)  [x] 0-24%     [] 25-49%     [] 50-74%     [] %  2. Practice building a balanced meal for dinner  at least 2 times over the next week  ACHIEVEMENT OF GOAL(S)  [] 0-24%     [] 25-49%     [] 50-74%     [] %  3. ACHIEVEMENT OF GOAL(S)  [] 0-24%     [] 25-49%     [] 50-74%     [] %       DATE DSMES TOPIC EVALUATION     1/31/2022 WHAT CAN I EAT?   a. General principles   b. Determining a healthy weight   c. Nutritional terms & tools    Healthy Plate method    Carbohydrate Counting    Reading food labels    Free apps   d.  Pregnancy recommendations      The participant    Uses Healthy Plate principles in constructing meals Is willing to try Healthy Plate over the next week   Reads food labels in choosing acceptable foods not yet covered    The participant would benefit from building better balanced meals that include carbs, proteins, and non-starchy vegetables. Montserrat Machado RN on 1/31/2022 at 2:27 PM    I have personally reviewed the health record, including provider notes, laboratory data and current medications before making these care and education recommendations. The time spent in this effort is included in the total time.   Total minutes: 60

## 2022-02-04 DIAGNOSIS — E11.9 TYPE 2 DIABETES MELLITUS WITHOUT COMPLICATION, WITH LONG-TERM CURRENT USE OF INSULIN (HCC): ICD-10-CM

## 2022-02-04 DIAGNOSIS — Z79.4 TYPE 2 DIABETES MELLITUS WITHOUT COMPLICATION, WITH LONG-TERM CURRENT USE OF INSULIN (HCC): ICD-10-CM

## 2022-02-04 RX ORDER — BLOOD SUGAR DIAGNOSTIC
STRIP MISCELLANEOUS
Qty: 300 STRIP | Refills: 3 | Status: SHIPPED | OUTPATIENT
Start: 2022-02-04

## 2022-02-07 ENCOUNTER — CLINICAL SUPPORT (OUTPATIENT)
Dept: DIABETES SERVICES | Age: 72
End: 2022-02-07
Payer: MEDICARE

## 2022-02-07 DIAGNOSIS — E10.65 TYPE 1 DIABETES MELLITUS WITH HYPERGLYCEMIA (HCC): Primary | ICD-10-CM

## 2022-02-07 PROCEDURE — G0109 DIAB MANAGE TRN IND/GROUP: HCPCS

## 2022-02-07 NOTE — PROGRESS NOTES
[facility] Program for Diabetes Health  Diabetes Self-Management Education & Support Program  Encounter note    SUMMARY  Diabetes self-care management training was completed related to Healthy eating. The participant will return on February 14 to continue DSMES related to Healthy eating and Monitoring. The participant did identify SMART Goal(s): - Continue to work on goal from last week - Practice building a balanced meal for dinner at least 2 times over the next week using Healthy Plate, and will practice knowledge and skills related to reducing risks, healthy eating and monitoring, being active and medications and healthy coping and problem solving to improve diabetes self-management. EVALUATION:  Ms. Weston's fasting BGs over the last week are mostly in the  range, with one outlier of 220; pre-dinner BGs range from 107-116 with 2 outliers of 170 and 56. She had no symptoms of hypoglycemia at 57 and is not sure what she had for lunch, but does remember eating lunch. I asked about the CGM from Dr. Yajaira Ludwig, she shared she tried one sensor and was not able to get the reader or her phone to work with it and then it fell off her arm after a few days. She did call [employer] and they sent a replacement sensor, she is going to bring everything with her to the appointment next week and I will help her apply the sensor and set up the reader/laurence. I think the alerts for lows will be very beneficial to her in addition to getting some insight on her over all BG patterns. She is not going to be able to afford CGM unless it is completely covered by insurance. She was not able to meet her SMART Goal of practicing building a balanced meal using Healthy Plate at least two times over the past week. It was difficult for her to remember which foods are carbs and did not find her handbook or the Healthy Plate model made in class very helpful.   After discussing this today, she felt it would be more helpful to create a list of the carbs she enjoys most often with their serving size equal to 15g of carbs. From that list we put together the breakfast choices she enjoys with serving sizes and carb counts to make building that meal easier, she feels good about using the list to help her pick out carbs at lunch and dinner as well. She is already doing a good job at dinner of building a balanced meal, but my concern is she is not eating enough carbs, proteins, or non-starchy vegetables at lunch, which is dropping her BG before dinner. She is willing to work on lunch. She states she is able to purchase adequate carbs, proteins, and vegetables with food stamps. She expressed understanding how to use nutrition labels to find the carb content in servings of packaged foods and apply that information to choosing portion sizes. We will continue with Healthy Eating next week and move to monitoring when she is ready. RECOMMENDATIONS:  Ms. Aury Lenz would benefit from keeping the list of her favorite carbs that we made today in her kitchen so that it is available to her when she is making a meal and making her grocery list.     Next provider visit is scheduled for 2/14/2022 for DSMES       SMART GOAL(S)  1. Practice building a balanced meal for dinner  at least 2 times over the next week  ACHIEVEMENT OF GOAL(S)  [x] 0-24%     [] 25-49%     [] 50-74%     [] %  2.  2/7/2022 - same goal as last week. ACHIEVEMENT OF GOAL(S)  [] 0-24%     [] 25-49%     [] 50-74%     [] %  3. ACHIEVEMENT OF GOAL(S)  [] 0-24%     [] 25-49%     [] 50-74%     [] %       DATE DSMES TOPIC EVALUATION     2/7/2022 WHAT CAN I EAT?   a. General principles   b. Determining a healthy weight   c. Nutritional terms & tools    Healthy Plate method    Carbohydrate Counting    Reading food labels    Free apps   d.  Pregnancy recommendations      The participant    Uses Healthy Plate principles in constructing meals Yes, in progress   Reads food labels in choosing acceptable foods Yes    The participant would benefit from keeping the list of her favorite carbs that we made today in her kitchen so that it is available to her when she is making a meal and making her grocery list. .     North Chou RN on 2/7/2022 at 2:51 PM    I have personally reviewed the health record, including provider notes, laboratory data and current medications before making these care and education recommendations. The time spent in this effort is included in the total time.   Total minutes: 60

## 2022-02-14 ENCOUNTER — CLINICAL SUPPORT (OUTPATIENT)
Dept: DIABETES SERVICES | Age: 72
End: 2022-02-14
Payer: MEDICARE

## 2022-02-14 DIAGNOSIS — E10.65 TYPE 1 DIABETES MELLITUS WITH HYPERGLYCEMIA (HCC): Primary | ICD-10-CM

## 2022-02-14 PROCEDURE — G0108 DIAB MANAGE TRN  PER INDIV: HCPCS

## 2022-02-17 NOTE — PROGRESS NOTES
[facility] Diabetes [facility]  Diabetes Self-Management Education & Support Program  Encounter note    SUMMARY  Diabetes self-care management training was completed related to Monitoring. The participant will return on February 21 to continue DSMES related to Physical activity and Taking medications. The participant did identify SMART Goal(s): - Scan Freestyle Jenn 2 at least 1x every 8 hours and scan fasting and before dinner, and will practice knowledge and skills related to reducing risks, healthy eating and monitoring, being active and medications and healthy coping and problem solving to improve diabetes self-management. EVALUATION:  Ms. Tatum Neither BGs are up a small amount from last week, 106-150 with one episode of hypoglycemia at 65 without symptoms, her pre-dinner BGs are much higher from last week and range from 208-290 with one 90. She can test her fasting BG anywhere from 9:30 AM to 11:30 AM and pre-dinner times range from 8:115 PM - 10:50 PM, her sleep cycle is very erratic and she does not feel compelled to establish a more regular pattern at this time. She states her pre-dinners were higher this week because she was babysitting for 3 consecutive days and eating mostly snacks versus regular and better balanced meals. I am encouraged that she recognizes and acknowledges the cause and effect related to the increase. I provided her a carb counting list that is more comprehensive than the lists in the handbook, she tells me she is using the book to help her remember which foods are carbs. Ms. Karli Gutierrez states she is comfortable checking her fasting and pre-dinner BG, however she was not clear on what information those BGs provide. After discussing monitoring, she expressed understanding of when to test and what information can be discerned from the results. She brought her [employer] supplies and I applied a new sensor on the back of her right upper arm.  She is not impressed with the concept CGM despite the break from lancing her fingers, she is willing to keep the sensor on for two weeks and then return the reader to Dr. Keira Powers so that she can see the data. I will take a look as well when she comes back next week. She does not like where the sensor is placed as the first sensor was knocked off and that she has to scan it with the reader, she has limited mobility in her arms, which makes scanning a bit tricky. She may like the Dexcom better, but her [employer] cell phone does not support any apps so she would need the reader. Ms. Choco Velarde does use proper monitoring technique when using a glucometer, I have suggested she test on all fingers versus the same 3 all the time. RECOMMENDATIONS:  Ms. Choco Velarde would benefit from giving the [employer] [place] a try, especially in light of having episodes of hypoglycemia without symptoms because the [employer] [place] [employer] has alarms for highs and lows. Next provider visit is scheduled for 2/21/2022       SMART GOAL(S)  1. Practice building a balanced meal for dinner  at least 2 times over the next week  ACHIEVEMENT OF GOAL(S)  [x]? 0-24%     []? 25-49%     []? 50-74%     []? %  2.  2/7/2022 - same goal as last week. ACHIEVEMENT OF GOAL(S)  []? 0-24%     []? 25-49%     []? 50-74%     [x]? %  3. Scan Freestyle Jenn 2 at least 1x every 8 hours and scan fasting and before dinner    ACHIEVEMENT OF GOAL(S)  []? 0-24%     []? 25-49%     []? 50-74%     []? %     DATE DSMES TOPIC EVALUATION     2/17/2022 HOW CAN BLOOD GLUCOSE MONITORING HELP ME?   a. Value of blood glucose monitoring   b. Realistic expectations   c. Blood glucose monitoring targets   d. Target adjustments   e. Setting a1c & blood glucose targets with provider   f. Meter selection    g. Technique for obtaining blood droplet   h. Blood glucose testing sites   i. Determining best times to test   j. Pregnancy recommendations   k.  Data sharing with provider        The participant    Can demonstrate their glucometer procedure Yes   Logs their BG readings Yes    The participant would benefit from trying to test her BG on all of her fingers versus the same 2-3 every day. Hayde Farah RN on 2/17/2022 at 2:30 PM    I have personally reviewed the health record, including provider notes, laboratory data and current medications before making these care and education recommendations. The time spent in this effort is included in the total time.   Total minutes: 60

## 2022-03-08 DIAGNOSIS — I10 ESSENTIAL HYPERTENSION: ICD-10-CM

## 2022-03-08 RX ORDER — IRBESARTAN 150 MG/1
TABLET ORAL
Qty: 90 TABLET | Refills: 3 | Status: SHIPPED | OUTPATIENT
Start: 2022-03-08

## 2022-03-15 ENCOUNTER — OFFICE VISIT (OUTPATIENT)
Dept: ENDOCRINOLOGY | Age: 72
End: 2022-03-15
Payer: MEDICARE

## 2022-03-15 VITALS
WEIGHT: 140 LBS | SYSTOLIC BLOOD PRESSURE: 142 MMHG | BODY MASS INDEX: 29.39 KG/M2 | HEART RATE: 77 BPM | DIASTOLIC BLOOD PRESSURE: 70 MMHG | HEIGHT: 58 IN

## 2022-03-15 DIAGNOSIS — E10.65 TYPE 1 DIABETES MELLITUS WITH HYPERGLYCEMIA (HCC): Primary | ICD-10-CM

## 2022-03-15 DIAGNOSIS — E10.42 TYPE 1 DIABETES MELLITUS WITH DIABETIC POLYNEUROPATHY (HCC): ICD-10-CM

## 2022-03-15 DIAGNOSIS — E78.2 MIXED HYPERLIPIDEMIA: ICD-10-CM

## 2022-03-15 DIAGNOSIS — I10 PRIMARY HYPERTENSION: ICD-10-CM

## 2022-03-15 PROCEDURE — G8399 PT W/DXA RESULTS DOCUMENT: HCPCS | Performed by: INTERNAL MEDICINE

## 2022-03-15 PROCEDURE — 2022F DILAT RTA XM EVC RTNOPTHY: CPT | Performed by: INTERNAL MEDICINE

## 2022-03-15 PROCEDURE — 1090F PRES/ABSN URINE INCON ASSESS: CPT | Performed by: INTERNAL MEDICINE

## 2022-03-15 PROCEDURE — G8419 CALC BMI OUT NRM PARAM NOF/U: HCPCS | Performed by: INTERNAL MEDICINE

## 2022-03-15 PROCEDURE — 1101F PT FALLS ASSESS-DOCD LE1/YR: CPT | Performed by: INTERNAL MEDICINE

## 2022-03-15 PROCEDURE — G8536 NO DOC ELDER MAL SCRN: HCPCS | Performed by: INTERNAL MEDICINE

## 2022-03-15 PROCEDURE — G8753 SYS BP > OR = 140: HCPCS | Performed by: INTERNAL MEDICINE

## 2022-03-15 PROCEDURE — 95251 CONT GLUC MNTR ANALYSIS I&R: CPT | Performed by: INTERNAL MEDICINE

## 2022-03-15 PROCEDURE — 3052F HG A1C>EQUAL 8.0%<EQUAL 9.0%: CPT | Performed by: INTERNAL MEDICINE

## 2022-03-15 PROCEDURE — G8427 DOCREV CUR MEDS BY ELIG CLIN: HCPCS | Performed by: INTERNAL MEDICINE

## 2022-03-15 PROCEDURE — 99214 OFFICE O/P EST MOD 30 MIN: CPT | Performed by: INTERNAL MEDICINE

## 2022-03-15 PROCEDURE — 3017F COLORECTAL CA SCREEN DOC REV: CPT | Performed by: INTERNAL MEDICINE

## 2022-03-15 PROCEDURE — G9899 SCRN MAM PERF RSLTS DOC: HCPCS | Performed by: INTERNAL MEDICINE

## 2022-03-15 PROCEDURE — G9717 DOC PT DX DEP/BP F/U NT REQ: HCPCS | Performed by: INTERNAL MEDICINE

## 2022-03-15 PROCEDURE — G8754 DIAS BP LESS 90: HCPCS | Performed by: INTERNAL MEDICINE

## 2022-03-15 NOTE — PROGRESS NOTES
Chief Complaint   Patient presents with    Diabetes       Patient was last seen: 3 months ago -12/16/2021     General:    working with [employer] now    Depression and stress affect sugars       A1c: last a1c was 8.5  (9/21)    DM Medications:   Novolin 70/30 insulin: 38 units breakfast, 19 units with dinner (rotates injection sites)                          If her sugar is <100 she can take half the dose,                          If her sugar is < 70 she should hold the dose     Key Antihyperglycemic Medications             insulin NPH/insulin regular (HumuLIN 70/30 U-100 Insulin) 100 unit/mL (70-30) injection (Taking) INJECT SUBCUTANEOUSLY 38 UNITS BEFORE BREAKFAST AND 19 UNITS BEFORE DINNER. DX: E10.9.           Last Changes: see last office visit note    Sugar Checks: checks more than 4 times a day and uses [employer] CGM     AM: see scanned CGM reports in 28 days, 20% use, 60^ target, 21% high, 13% very high (10p-12a)    PM: see scanned CGM reports     LOWs:  has low sugars 6%    DIET: eats what they want, not much education - is part of [employer] now    EXERCISE: exercise limited due to pain     HTN: at goal, on ARB     LIPIDS: at goal, on statin, lipids followed by PCP    RENAL: has normal renal function, has normal FELTON-r , in the past year, is on an ARB     EYES: overdue for eye exam, has no retinopathy     FEET: sees podiatry, has numbness, has tingling at times, has some pain, has hammer toes and bunions     DENTAL:  has no teeth     HEART:  no chest pain, shortness of breath or claudication, has no cardiac history     ASA:  is on aspirin     SYMPTOMS: no polyuria, thirst or blurred vision     THYROID: no known thyroid issue    DIABETES HISTORY:   From 1st visit 12/16/2021  Type 1 Diabetic diagnosed 1976 near when had oldest child  On insulin since then; though at one point told didn't need  Can also not take 70/30 insulin in AM and not be nathaly high by next dose which is odd  No other insulins except mixed insulin  Currently is free    LABS/STUDIES:  Lab Results   Component Value Date/Time    Hemoglobin A1c 8.8 (H) 03/15/2022 01:16 PM    Hemoglobin A1c 8.5 (H) 09/10/2021 02:35 PM    Hemoglobin A1c 8.3 (H) 09/19/2018 10:29 AM    Glucose 78 03/15/2022 01:16 PM    Glucose  05/23/2018 10:07 AM    GFR est AA 70 09/10/2021 02:35 PM    GFR est non-AA 61 09/10/2021 02:35 PM    Microalbumin/Creat ratio (mg/g creat) 9 02/25/2015 06:30 PM    Microalb/Creat ratio (ug/mg creat.) 6 09/10/2021 02:35 PM    Microalbumin,urine random 0.95 02/25/2015 06:30 PM    Creatinine 0.91 03/15/2022 01:16 PM        Lab Results   Component Value Date/Time    Cholesterol, total 163 09/10/2021 02:35 PM    Triglyceride 150 (H) 09/10/2021 02:35 PM    HDL Cholesterol 57 09/10/2021 02:35 PM    LDL, calculated 80 09/10/2021 02:35 PM    LDL, calculated 73 01/14/2020 04:15 PM          Past Medical History:   Diagnosis Date    Arthritis     Cross-eye     Depression     Diabetes ([facility] [address]     Type 1    Flatulence     Hypercholesterolemia     Hypertension     Menopause     LMP-63years old?  Sinus congestion         Blood pressure (!) 142/70, pulse 77, height 4' 10\" (1.473 m), weight 140 lb (63.5 kg).      Weight Metrics 3/15/2022 12/16/2021 11/4/2021 6/25/2021 6/8/2021 10/21/2020 1/14/2020   Weight 140 lb 142 lb 3.2 oz 143 lb 135 lb 134 lb 140 lb 3.2 oz 138 lb 12.8 oz   BMI 29.26 kg/m2 29.72 kg/m2 29.89 kg/m2 28.22 kg/m2 28.01 kg/m2 29.3 kg/m2 29.01 kg/m2        EXAM  - GENERAL: NCAT, Appears well nourished   - EYES: EOMI, non-icteric, no proptosis   - Ear/Nose/Throat: NCAT, no visible inflammation or masses   - CARDIOVASCULAR: no cyanosis, no visible JVD   - RESPIRATORY: respiratory effort normal without any distress or labored breathing   - MUSCULOSKELETAL: Normal ROM of neck and upper extremities observed   - SKIN: No rash on face  - NEUROLOGIC:  No facial asymmetry (Cranial nerve 7 motor function), No gaze palsy   - PSYCHIATRIC: Normal affect, Normal insight and judgement    Assessment/Plan:   1. Type 1 diabetes mellitus with hyperglycemia (HCC)  Very difficult - on 70/30 and continuous glucose monitor shows all the typical problems associated with the insulin  Peaks after lunch b/c no coverage there  High after dinner, but goes down overnight - can't increase dose at dinner b/c already going down overnight due to the N portion  Check c-peptide - if makes insulin, maybe can give sulfonylurea at lunch to cover Ln and Dn  If does not make insulin, MDI would be best - NPH BID plus R TID AC - but patient does not want more injections  She gets 70/30 for free and told her the N & R are generic as well and likely free too but she is not convinced    2. Primary hypertension  On meds per PCP    3. Mixed hyperlipidemia  At goal on statin per PCP    Orders Placed This Encounter    [place] CONTINUOUS GLUCOSE MONITORING ANALYSIS I&R      Follow-up and Dispositions    · Return in about 2 months (around 5/15/2022).

## 2022-03-16 LAB
BUN SERPL-MCNC: 26 MG/DL (ref 8–27)
BUN/CREAT SERPL: 29 (ref 12–28)
C PEPTIDE SERPL-MCNC: 0.5 NG/ML (ref 1.1–4.4)
CALCIUM SERPL-MCNC: 10.8 MG/DL (ref 8.7–10.3)
CHLORIDE SERPL-SCNC: 102 MMOL/L (ref 96–106)
CO2 SERPL-SCNC: 24 MMOL/L (ref 20–29)
CREAT SERPL-MCNC: 0.91 MG/DL (ref 0.57–1)
EGFR: 68 ML/MIN/1.73
EST. AVERAGE GLUCOSE BLD GHB EST-MCNC: 206 MG/DL
GLUCOSE SERPL-MCNC: 78 MG/DL (ref 65–99)
HBA1C MFR BLD: 8.8 % (ref 4.8–5.6)
POTASSIUM SERPL-SCNC: 4.2 MMOL/L (ref 3.5–5.2)
SODIUM SERPL-SCNC: 141 MMOL/L (ref 134–144)

## 2022-03-18 PROBLEM — M15.0 PRIMARY OSTEOARTHRITIS INVOLVING MULTIPLE JOINTS: Status: ACTIVE | Noted: 2017-07-31

## 2022-03-18 PROBLEM — M15.9 PRIMARY OSTEOARTHRITIS INVOLVING MULTIPLE JOINTS: Status: ACTIVE | Noted: 2017-07-31

## 2022-03-18 PROBLEM — F33.0 MILD EPISODE OF RECURRENT MAJOR DEPRESSIVE DISORDER (HCC): Status: ACTIVE | Noted: 2017-09-27

## 2022-03-18 PROBLEM — E66.9 OBESITY (BMI 30-39.9): Status: ACTIVE | Noted: 2017-09-27

## 2022-03-18 PROBLEM — E55.9 VITAMIN D DEFICIENCY: Status: ACTIVE | Noted: 2017-04-28

## 2022-03-18 PROBLEM — I10 ESSENTIAL HYPERTENSION: Status: ACTIVE | Noted: 2017-04-28

## 2022-03-18 PROBLEM — M85.80 OSTEOPENIA: Status: ACTIVE | Noted: 2017-09-27

## 2022-03-18 PROBLEM — J30.9 ALLERGIC RHINITIS: Status: ACTIVE | Noted: 2017-04-28

## 2022-03-19 PROBLEM — E78.2 MIXED HYPERLIPIDEMIA: Status: ACTIVE | Noted: 2017-04-28

## 2022-03-19 PROBLEM — G89.4 CHRONIC PAIN SYNDROME: Status: ACTIVE | Noted: 2017-09-27

## 2022-03-19 PROBLEM — S46.011A TRAUMATIC COMPLETE TEAR OF RIGHT ROTATOR CUFF: Status: ACTIVE | Noted: 2018-09-24

## 2022-03-19 PROBLEM — E10.42 TYPE 1 DIABETES MELLITUS WITH DIABETIC POLYNEUROPATHY (HCC): Status: ACTIVE | Noted: 2017-04-28

## 2022-03-20 PROBLEM — G89.29 CHRONIC LEFT SHOULDER PAIN: Status: ACTIVE | Noted: 2017-05-10

## 2022-03-20 PROBLEM — R29.6 REPEATED FALLS: Status: ACTIVE | Noted: 2021-06-08

## 2022-03-20 PROBLEM — M25.512 CHRONIC LEFT SHOULDER PAIN: Status: ACTIVE | Noted: 2017-05-10

## 2022-03-29 ENCOUNTER — OFFICE VISIT (OUTPATIENT)
Dept: INTERNAL MEDICINE CLINIC | Age: 72
End: 2022-03-29
Payer: MEDICARE

## 2022-03-29 ENCOUNTER — TELEPHONE (OUTPATIENT)
Dept: ENDOCRINOLOGY | Age: 72
End: 2022-03-29

## 2022-03-29 VITALS
HEIGHT: 58 IN | BODY MASS INDEX: 29.28 KG/M2 | WEIGHT: 139.5 LBS | SYSTOLIC BLOOD PRESSURE: 129 MMHG | OXYGEN SATURATION: 95 % | RESPIRATION RATE: 12 BRPM | HEART RATE: 71 BPM | DIASTOLIC BLOOD PRESSURE: 78 MMHG | TEMPERATURE: 98.5 F

## 2022-03-29 DIAGNOSIS — E78.2 MIXED HYPERLIPIDEMIA: ICD-10-CM

## 2022-03-29 DIAGNOSIS — F33.0 MILD EPISODE OF RECURRENT MAJOR DEPRESSIVE DISORDER (HCC): ICD-10-CM

## 2022-03-29 DIAGNOSIS — M15.9 PRIMARY OSTEOARTHRITIS INVOLVING MULTIPLE JOINTS: ICD-10-CM

## 2022-03-29 DIAGNOSIS — E10.42 TYPE 1 DIABETES MELLITUS WITH DIABETIC POLYNEUROPATHY (HCC): ICD-10-CM

## 2022-03-29 DIAGNOSIS — I10 ESSENTIAL HYPERTENSION: Primary | ICD-10-CM

## 2022-03-29 PROBLEM — G89.4 CHRONIC PAIN SYNDROME: Status: RESOLVED | Noted: 2017-09-27 | Resolved: 2022-03-29

## 2022-03-29 PROBLEM — M25.512 CHRONIC LEFT SHOULDER PAIN: Status: RESOLVED | Noted: 2017-05-10 | Resolved: 2022-03-29

## 2022-03-29 PROBLEM — G89.29 CHRONIC LEFT SHOULDER PAIN: Status: RESOLVED | Noted: 2017-05-10 | Resolved: 2022-03-29

## 2022-03-29 PROBLEM — S46.011A TRAUMATIC COMPLETE TEAR OF RIGHT ROTATOR CUFF: Status: RESOLVED | Noted: 2018-09-24 | Resolved: 2022-03-29

## 2022-03-29 PROCEDURE — G9899 SCRN MAM PERF RSLTS DOC: HCPCS | Performed by: INTERNAL MEDICINE

## 2022-03-29 PROCEDURE — G8536 NO DOC ELDER MAL SCRN: HCPCS | Performed by: INTERNAL MEDICINE

## 2022-03-29 PROCEDURE — G8752 SYS BP LESS 140: HCPCS | Performed by: INTERNAL MEDICINE

## 2022-03-29 PROCEDURE — G8399 PT W/DXA RESULTS DOCUMENT: HCPCS | Performed by: INTERNAL MEDICINE

## 2022-03-29 PROCEDURE — G8419 CALC BMI OUT NRM PARAM NOF/U: HCPCS | Performed by: INTERNAL MEDICINE

## 2022-03-29 PROCEDURE — 1101F PT FALLS ASSESS-DOCD LE1/YR: CPT | Performed by: INTERNAL MEDICINE

## 2022-03-29 PROCEDURE — 99214 OFFICE O/P EST MOD 30 MIN: CPT | Performed by: INTERNAL MEDICINE

## 2022-03-29 PROCEDURE — G8427 DOCREV CUR MEDS BY ELIG CLIN: HCPCS | Performed by: INTERNAL MEDICINE

## 2022-03-29 PROCEDURE — 3017F COLORECTAL CA SCREEN DOC REV: CPT | Performed by: INTERNAL MEDICINE

## 2022-03-29 PROCEDURE — 1090F PRES/ABSN URINE INCON ASSESS: CPT | Performed by: INTERNAL MEDICINE

## 2022-03-29 PROCEDURE — G9717 DOC PT DX DEP/BP F/U NT REQ: HCPCS | Performed by: INTERNAL MEDICINE

## 2022-03-29 PROCEDURE — G8754 DIAS BP LESS 90: HCPCS | Performed by: INTERNAL MEDICINE

## 2022-03-29 PROCEDURE — 2022F DILAT RTA XM EVC RTNOPTHY: CPT | Performed by: INTERNAL MEDICINE

## 2022-03-29 PROCEDURE — 3052F HG A1C>EQUAL 8.0%<EQUAL 9.0%: CPT | Performed by: INTERNAL MEDICINE

## 2022-03-29 RX ORDER — TERBINAFINE HYDROCHLORIDE 250 MG/1
250 TABLET ORAL DAILY
COMMUNITY
Start: 2022-02-23 | End: 2022-11-04 | Stop reason: ALTCHOICE

## 2022-03-29 NOTE — TELEPHONE ENCOUNTER
----- Message from Connor Painter MD sent at 3/16/2022 10:58 AM EDT -----  Let know current a1c is 8.8  Her insulin reserve is low so that other med I mentioned may not work, but since generic and cheap may be worth a try - I will think about it more  Electrolytes and kidney function are normal - except calcium is high -was not an issue until 9/21. Should not cause symptoms and can work that up more at the next visit.

## 2022-03-29 NOTE — PROGRESS NOTES
CC:   Chief Complaint   Patient presents with    Diabetes    Hypertension       HISTORY OF PRESENT ILLNESS  Aron Guerrero is a 80 y.o. female. Presents for 4 month follow up evaluation. She has type 1 DM, HTN, hyperlipidemia, OA at multiple joints, and seasonal allergic rhinitis.      Denies polydipsia, polyuria, or hypoglycemia. She takes insulin 70/30 38 units before breakfast and 19 units before dinner. Home glucose monitoring: is performed 2 times daily. She reports medication compliance: compliant all of the time. Medication side effects: none. Diabetic diet compliance: noncompliant some of the time. Lab review: A1c 8.8% on 3/15/22, was 8.5% on 9/10/21 and 9.0% on 6/24/21. Eye exam: UTD      Denies HA's, CP, SOB, dizziness, heart palpitations, or leg swelling. Home BP monitoring: not done. She reports taking medications as instructed, no medication side effects noted.  Diet and Lifestyle: generally follows a low fat low cholesterol diet, generally follows a low sodium diet, no formal exercise but active during the day.     Health Maintenance  COVID booster vaccine: due for this  Eye exam: 3/2022, Dr. Gertha Runner, [facility]. Was told she had cataracts at both eyes, left worse than right. ROS  Positive for chronic diffuse joint pains, low back pain  A complete review of systems was performed and is negative except for those mentioned in the HPI. Patient Active Problem List   Diagnosis Code    Type 1 diabetes mellitus with diabetic polyneuropathy ([facility] [address] E10.42    Essential hypertension I10    Mixed hyperlipidemia E78.2    Allergic rhinitis J30.9    Vitamin D deficiency E55.9    Chronic left shoulder pain M25.512, G89.29    Primary osteoarthritis involving multiple joints M89.49    Obesity (BMI 30-39. 9) E66.9    Osteopenia M85.80    Mild episode of recurrent major depressive disorder (HCC) F33.0    Chronic pain syndrome G89.4    Traumatic complete tear of right rotator cuff O01.063W    Repeated falls R29.6     Past Medical History:   Diagnosis Date    Arthritis     Cross-eye     Depression     Diabetes ([facility] [address]     Type 1    Flatulence     Hypercholesterolemia     Hypertension     Menopause     LMP-63years old?  Sinus congestion      Allergies   Allergen Reactions    Compazine [Prochlorperazine Edisylate] Other (comments)     Tongue swelling, drooling    Naprosyn [Naproxen] Other (comments)     Belching,gas,stomach pain    Percocet [Oxycodone-Acetaminophen] Other (comments)     Visual problems/seeings in triplicate per patient       Current Outpatient Medications   Medication Sig Dispense Refill    terbinafine HCL (LAMISIL) 250 mg tablet Take 250 mg by mouth daily.  irbesartan (AVAPRO) 150 mg tablet TAKE 1 TABLET BY MOUTH EVERY DAY 90 Tablet 3    Accu-Chek Jaja Plus test strp strip USE TO TEST BLOOD SUGARS 3 TIMES A DAY. DX: E10.9 300 Strip 3    diclofenac EC (VOLTAREN) 50 mg EC tablet TAKE 1 TABLET BY MOUTH TWO (2) TIMES DAILY AS NEEDED FOR PAIN. 60 Tablet 2    insulin NPH/insulin regular (HumuLIN 70/30 U-100 Insulin) 100 unit/mL (70-30) injection INJECT SUBCUTANEOUSLY 38 UNITS BEFORE BREAKFAST AND 19 UNITS BEFORE DINNER. DX: E10.9. 50 mL 2    buPROPion SR (WELLBUTRIN SR) 150 mg SR tablet TAKE 1 TABLET BY MOUTH TWO (2) TIMES A DAY. 180 Tablet 1    Accu-Chek Softclix Lancets misc USE TO TEST BLOOD SUGAR 3 TIMES PER DAY FOR DX: E10.9 1 Each 5    gabapentin (NEURONTIN) 300 mg capsule Take 300 mg by mouth three (3) times daily. Prescribed by Dr. Gabriel Disla. 60 Capsule 0    Insulin Syringe-Needle U-100 (BD Insulin Syringe Ultra-Fine) 0.5 mL 30 gauge x 1/2\" syrg USE TO INJECT INSULIN TWICE DAILY. DX: E10.9 180 Syringe 6    rosuvastatin (CRESTOR) 20 mg tablet TAKE 1 TABLET BY MOUTH EVERYDAY AT BEDTIME 90 Tab 3    aspirin delayed-release 81 mg tablet Take 1 Tab by mouth daily.  90 Tab 3    cholecalciferol, vitamin D3, (VITAMIN D3) 2,000 unit tab Take 2,000 Units by mouth.           PHYSICAL EXAM  Visit Vitals  /78 (BP 1 Location: Left upper arm, BP Patient Position: Sitting)   Pulse 71   Temp 98.5 °F (36.9 °C) (Oral)   Resp 12   Ht 4' 10\" (1.473 m)   Wt 139 lb 8 oz (63.3 kg)   SpO2 95%   BMI 29.16 kg/m²       General: Overweight, no distress. HEENT:  Head normocephalic/atraumatic, no scleral icterus  Neck: Supple. No lymphadenopathy or thyromegaly. Lungs:  Clear to ausculation bilaterally. Good air movement. Heart:  Regular rate and rhythm, normal S1 and S2, no murmur, gallop, or rub  Extremities: No clubbing, cyanosis, or edema. Normal ROM with crepitus at both knees. Neurological: Alert and oriented. Psychiatric: Normal mood and affect. Behavior is normal.     Results for orders placed or performed in visit on 12/16/21   HEMOGLOBIN A1C WITH EAG   Result Value Ref Range    Hemoglobin A1c 8.8 (H) 4.8 - 5.6 %    Estimated average glucose 206 mg/dL   C-PEPTIDE   Result Value Ref Range    C-Peptide 0.5 (L) 1.1 - 4.4 ng/mL   METABOLIC PANEL, BASIC   Result Value Ref Range    Glucose 78 65 - 99 mg/dL    BUN 26 8 - 27 mg/dL    Creatinine 0.91 0.57 - 1.00 mg/dL    eGFR 68 >59 mL/min/1.73    BUN/Creatinine ratio 29 (H) 12 - 28    Sodium 141 134 - 144 mmol/L    Potassium 4.2 3.5 - 5.2 mmol/L    Chloride 102 96 - 106 mmol/L    CO2 24 20 - 29 mmol/L    Calcium 10.8 (H) 8.7 - 10.3 mg/dL         ASSESSMENT AND PLAN    ICD-10-CM ICD-9-CM    1. Essential hypertension  I10 401.9    2. Mixed hyperlipidemia  E78.2 272.2    3. Type 1 diabetes mellitus with diabetic polyneuropathy (HCC)  E10.42 250.61      357.2    4. Mild episode of recurrent major depressive disorder (HCC)  F33.0 296.31    5. Primary osteoarthritis involving multiple joints  M89.49 715.98      Diagnoses and all orders for this visit:    1. Essential hypertension  Controlled on irbesartan. 2. Mixed hyperlipidemia  9/10/21: tot chol 163, LDL 80. Controlled on Crestor 20 mg nightly.  Continue daily ASA 81 mg.    3. Type 1 diabetes mellitus with diabetic polyneuropathy (HCC)  Continue present insulin regimen. Follow up with Dr. Alisha James and with Matthew Carrillo (Diabetes Education). 4. Mild episode of recurrent major depressive disorder (HCC)  Controlled on Wellbutrin  mg twice daily. 5. Primary osteoarthritis involving multiple joints  Continue diclofenac 50 mg twice daily as needed for pain. Lab Results   Component Value Date/Time    Cholesterol, total 163 09/10/2021 02:35 PM    HDL Cholesterol 57 09/10/2021 02:35 PM    LDL, calculated 80 09/10/2021 02:35 PM    LDL, calculated 73 01/14/2020 04:15 PM    VLDL, calculated 26 09/10/2021 02:35 PM    VLDL, calculated 12 01/14/2020 04:15 PM    Triglyceride 150 (H) 09/10/2021 02:35 PM    CHOL/HDL Ratio 2.5 04/20/2016 06:45 PM       Follow-up and Dispositions    · Return in about 4 months (around 7/29/2022), or if symptoms worsen or fail to improve, for HTN, OA, DM. I have discussed the diagnosis with the patient and the intended plan as seen in the above orders. Patient is in agreement. The patient has received an after-visit summary and questions were answered concerning future plans. I have discussed medication side effects and warnings with the patient as well.

## 2022-03-29 NOTE — PATIENT INSTRUCTIONS
Learning About Tests When You Have Diabetes  Why do you need regular tests? Diabetes can lead to other health problems if it's not well controlled. You'll need tests to monitor how well your diabetes is controlled and to check for other things like high cholesterol or kidney problems. Having tests on a regular schedule can help your doctor find problems early, when it's easier to manage them. What tests do you need? These are the tests you may need and how often you should have them. A1c blood test.  This test shows the average level of blood sugar over the past 2 to 3 months. It helps your doctor see whether blood sugar levels have been staying within your target range. · How often: Every 3 to 6 months  · Goal: A blood sugar level in your target range  Blood pressure test.  This test measures the pressure of blood flow in the arteries. Controlling blood pressure can help prevent damage to nerves and blood vessels. · How often: Every 3 to 6 months  · Goal: A blood pressure level in your target range  Cholesterol test.  This test measures the amount of a type of fat in the blood. It is common for people with diabetes to also have high cholesterol. Too much cholesterol in the blood can build up inside the blood vessels and raise the risk for heart attack and stroke. · How often: At the time of your diabetes diagnosis, and as often as your doctor recommends after that  · Goal: A cholesterol level in your target range  Albumin-creatinine ratio test.  This test checks for kidney damage by looking for the protein albumin (say \"al-BYOO-anali\") in the urine. Albumin is normally found in the blood. Kidney damage can let small amounts of it (microalbumin) leak into the urine. · How often: Once a year  · Goal: No protein in the urine  Blood creatinine test/estimated glomerular filtration (eGFR). The blood creatinine (say \"fsvl-SV-rb-neen\") level shows how well your kidneys are working.  Creatinine is a waste product that muscles release into the blood. Blood creatinine is used to estimate the glomerular filtration rate. A high level of creatinine and/or a low eGFR may mean your kidneys are not working as well as they should. · How often: Once a year  · Goal: Normal level of creatinine in the blood. The eGFR goal is greater than 60 mL/min/1.73 m². Complete foot exam.  The doctor checks for foot sores and whether any sensation has been lost.  · How often: Once a year  · Goal: Healthy feet with no foot ulcers or loss of feeling  Dental exam and cleaning. The dentist checks for gum disease and tooth decay. People with high blood sugar are more likely to have these problems. · How often: Every 6 months  · Goal: Healthy teeth and gums  Complete eye exam.  High blood sugar levels can damage the eyes. This exam is done by an ophthalmologist or optometrist. It includes a dilated eye exam. The exam shows whether there's damage to the back of the eye (diabetic retinopathy). · How often: Once a year. If you don't have any signs of diabetic retinopathy, your doctor may recommend an exam every 2 years. · Goal: No damage to the back of the eye  Thyroid-stimulating hormone (TSH) blood test.  This test checks for thyroid disease. Too little thyroid hormone can cause some medicines (like insulin) to stay in the body longer. This can cause low blood sugar. You may be tested if you have high cholesterol or are a woman over 48years old. · How often: As part of your diabetes diagnosis, and as often as your doctor recommends after that  · Goal: Normal level of TSH in the blood  Follow-up care is a key part of your treatment and safety. Be sure to make and go to all appointments, and call your doctor if you are having problems. It's also a good idea to know your test results and keep a list of the medicines you take. Where can you learn more?   Go to http://www.[employer].com/  Enter A243 in the search box to learn more about \"Learning About Tests When You Have Diabetes. \"  Current as of: July 28, 2021               Content Version: 13.2  © 1101-3200 [employer]. Care instructions adapted under license by [employer] (which disclaims liability or warranty for this information). If you have questions about a medical condition or this instruction, always ask your healthcare professional. [place] [address] any warranty or liability for your use of this information.

## 2022-03-29 NOTE — TELEPHONE ENCOUNTER
I called Ms. Stafford and relayed the message from Dr. Brennon Gramajo. She seemed to understand this information.   Lazaro Gifford

## 2022-03-29 NOTE — PROGRESS NOTES
Freddy Weston  Identified pt with two pt identifiers(name and ). Chief Complaint   Patient presents with    Diabetes    Hypertension       Reviewed record In preparation for visit and have obtained necessary documentation. 1. Have you been to the ER, urgent care clinic or hospitalized since your last visit? No     2. Have you seen or consulted any other health care providers outside of the [address] [place] [address] since your last visit? Include any pap smears or colon screening. No    Vitals reviewed with provider.     Health Maintenance reviewed:     Health Maintenance Due   Topic    Eye Exam Retinal or Dilated     COVID-19 Vaccine (2 - Booster for [employer] series)          Wt Readings from Last 3 Encounters:   22 139 lb 8 oz (63.3 kg)   03/15/22 140 lb (63.5 kg)   21 142 lb 3.2 oz (64.5 kg)        Temp Readings from Last 3 Encounters:   22 98.5 °F (36.9 °C) (Oral)   21 98.2 °F (36.8 °C) (Oral)   21 97.8 °F (36.6 °C) (Oral)        BP Readings from Last 3 Encounters:   22 129/78   03/15/22 (!) 142/70   21 131/78        Pulse Readings from Last 3 Encounters:   22 71   03/15/22 77   21 80        Vitals:    22 1456   BP: 129/78   Pulse: 71   Resp: 12   Temp: 98.5 °F (36.9 °C)   TempSrc: Oral   SpO2: 95%   Weight: 139 lb 8 oz (63.3 kg)   Height: 4' 10\" (1.473 m)   PainSc:   2   PainLoc: Generalized          Learning Assessment:   :       Learning Assessment 2017   PRIMARY LEARNER Patient   PRIMARY LANGUAGE ENGLISH   LEARNER PREFERENCE PRIMARY READING   ANSWERED BY patient   RELATIONSHIP SELF        Depression Screening:   :       3 most recent PHQ Screens 2021   PHQ Not Done -   Little interest or pleasure in doing things Not at all   Feeling down, depressed, irritable, or hopeless Nearly every day   Total Score PHQ 2 3   Trouble falling or staying asleep, or sleeping too much More than half the days   Feeling tired or having little energy Several days   Poor appetite, weight loss, or overeating Several days   Feeling bad about yourself - or that you are a failure or have let yourself or your family down Not at all   Trouble concentrating on things such as school, work, reading, or watching TV Not at all   Moving or speaking so slowly that other people could have noticed; or the opposite being so fidgety that others notice Not at all   Thoughts of being better off dead, or hurting yourself in some way Not at all   PHQ 9 Score 7   How difficult have these problems made it for you to do your work, take care of your home and get along with others Somewhat difficult        Fall Risk Assessment:   :       Fall Risk Assessment, last 12 mths 11/4/2021   Able to walk? Yes   Fall in past 12 months? 0   Do you feel unsteady? 1   Are you worried about falling 1   Number of falls in past 12 months -   Fall with injury? -        Abuse Screening:   :       Abuse Screening Questionnaire 11/4/2021 10/21/2020 7/31/2019 4/28/2017   Do you ever feel afraid of your partner? N N N N   Are you in a relationship with someone who physically or mentally threatens you? N N N N   Is it safe for you to go home?  Y Y Y Y        ADL Screening:   :       ADL Assessment 11/4/2021   Feeding yourself No Help Needed   Getting from bed to chair No Help Needed   Getting dressed No Help Needed   Bathing or showering No Help Needed   Walk across the room (includes cane/walker) No Help Needed   Using the telphone No Help Needed   Taking your medications No Help Needed   Preparing meals No Help Needed   Managing money (expenses/bills) No Help Needed   Moderately strenuous housework (laundry) No Help Needed   Shopping for personal items (toiletries/medicines) No Help Needed   Shopping for groceries No Help Needed   Driving Help Needed   Climbing a flight of stairs No Help Needed   Getting to places beyond walking distances No Help Needed

## 2022-04-04 DIAGNOSIS — E78.2 MIXED HYPERLIPIDEMIA: ICD-10-CM

## 2022-04-04 RX ORDER — ROSUVASTATIN CALCIUM 20 MG/1
TABLET, COATED ORAL
Qty: 90 TABLET | Refills: 3 | Status: SHIPPED | OUTPATIENT
Start: 2022-04-04

## 2022-04-18 ENCOUNTER — CLINICAL SUPPORT (OUTPATIENT)
Dept: DIABETES SERVICES | Age: 72
End: 2022-04-18
Payer: MEDICARE

## 2022-04-18 DIAGNOSIS — E10.65 TYPE 1 DIABETES MELLITUS WITH HYPERGLYCEMIA (HCC): Primary | ICD-10-CM

## 2022-04-18 PROCEDURE — G0108 DIAB MANAGE TRN  PER INDIV: HCPCS

## 2022-04-18 NOTE — PROGRESS NOTES
[address] [place] [address] for Diabetes Health  Diabetes Self-Management Education & Support Program  Encounter Note    SUMMARY  Diabetes self-care management training was completed related to healthy eating. he participant will return on April 25 to continue DSMES related to healthy eating. The participant did identify SMART Goal(s) and will practice knowledge and skills related to reducing risks, healthy eating and monitoring and medications to improve diabetes self-management. EVALUATION:  Mauricio Fitzgerald reports her BGs for April (fasting and pre-dinner) have ranged from , lows can occur at fasting and before dinner. She is spacing meals appropriately and the carb content of her meals does appear to be close to 45-50g most of the time. Highs before dinner tend to be related to eating sweets 1-2 hours prior. Ms. Fabian Gallagher is aware she needs to choose healthier low-carb snacks and try to create meals that are more like the Healthy Plate model to help control carbs. She is not willing to give up sweets entirely, she is willing to stop eating sweets (mini milkyway bars, frosted brownies) as snacks and choose 4 crackers and cheese or peanut butter, or a small zheng instead. In exchange, she is also willing to reduce her carbs at dinner by half so that she may include a small treat or dessert directly after dinner no more than 1x/week. We will evaluate how this went at [facility] next week. Ms. Fabian Gallagher is open to the idea of changing her insulin regimen per discussion with Dr. Nazia Aranda, but does not see how the change might be helpful. She does not quite grasp how changing in the timing of the insulins can help reduce her highs and her lows. She plans to contact her insurance this week to see if NPH and Regular are covered/free for her. RECOMMENDATIONS:  Ms. Fabian Gallagher should talk with Dr. Rimma Green about difficulty sleeping/staying asleep.   She is often up all night and then getting a few hours of sleep between 6-11 AM.  She states she can not \"quiet her head\" at night. We have discussed trying to establish healthy sleep hygiene practices, but so far this has not helped and is likely impacting overall health and BGs.     TOPICS DISCUSSED TODAY:  WHAT CAN I EAT? 60      Next provider visit is scheduled for 4/25/2022       SMART GOAL(S)  1. Practice building a balanced meal for dinner  at least 2 times over the next week  ACHIEVEMENT OF GOAL(S)  [x]? ? 0-24%     []? ? 25-49%     []? ? 50-74%     []? ? %  2.  2/7/2022 - same goal as last week.    ACHIEVEMENT OF GOAL(S)  [x]? ? 0-24%     []? ? 25-49%     []? ? 50-74%     [x]? ? %  3.  Scan Freestyle Jenn 2 at least 1x every 8 hours and scan fasting and before dinner    ACHIEVEMENT OF GOAL(S) :  50-74%  3. Contact insurance to see if Regular and NPH insulin are covered/free before next DSMES visit on  4/25/2022  ACHIEVEMENT OF GOAL(S) :         DATE DSMES TOPIC EVALUATION     4/18/2022 WHAT CAN I EAT?   a. General principles   b. Determining a healthy weight   c. Nutritional terms & tools    Healthy Plate method    Carbohydrate Counting    Reading food labels    Free apps   d. Pregnancy recommendations      The participant    Uses Healthy Plate principles in constructing meals: No , continuing to reinforce and encourage. Reads food labels in choosing acceptable foods: Yes , working on applying  The participant needs to address choosing low carb snacks and following the Healthy Plate model at lunch and dinner. Pa Hope RN on 4/18/2022 at 2:15 PM    I have personally reviewed the health record, including provider notes, laboratory data and current medications before making these care and education recommendations. The time spent in this effort is included in the total time.   Total minutes: 60

## 2022-04-25 ENCOUNTER — CLINICAL SUPPORT (OUTPATIENT)
Dept: DIABETES SERVICES | Age: 72
End: 2022-04-25
Payer: MEDICARE

## 2022-04-25 DIAGNOSIS — E10.65 TYPE 1 DIABETES MELLITUS WITH HYPERGLYCEMIA (HCC): Primary | ICD-10-CM

## 2022-04-25 PROCEDURE — G0108 DIAB MANAGE TRN  PER INDIV: HCPCS

## 2022-04-25 NOTE — PROGRESS NOTES
[employer] [facility]  Diabetes Self-Management Education & Support Program  Encounter Note    SUMMARY  Diabetes self-care management training was completed related to monitoring and taking medications. he participant will return on May 23 to continue DSMES related to physical activity, healthy coping and problem solving. The participant did identify SMART Goal(s) and will practice knowledge and skills related to reducing risks, healthy eating and monitoring, medications, healthy coping and problem solving to improve diabetes self-management. EVALUATION:  Ms. Arlene Reid has not yet achieved her SMART Goal of contacting her insurance company to see if Regular and NPH insulin are covered, but she expressed interested when I explained it might help reduce her episodes of hypoglycemia and she may call next week. She did not bring her BG log with her today, but she says her BGs have improved and all been <200, except one before dinner, since our last appointment on 4/18/2022. She states she has stopping eating sweets between meals and is eating 4 crackers and cheese or peanut better instead, she believes this is a sustainable strategy for her. She has had 2 episodes of hypoglycemia (50s) since 4/18/2022, she could not say what day or time of day those occurred. Today we covered Monitoring and Medications, Ms. Arlene Reid expressed understanding both, see boxes below SMART Goals for details. Ms. Arlene Reid currently monitors her fasting and pre-dinner BGs, she is willing to occasionally check 2-hour postprandials in place of the pre-dinner check. She is aware of the BG targets and when and how to use the 15-15 Rule to treat hypoglycemia.     We discussed the action of Humulin 70/30 and she has a basic understanding of why it can cause hypoglycemia and why changing her insulin may help with this and the highs, however she is not convinced that she wants to mix insulins or take 3 injections daily. At the next appointment we will discuss physical activity, healthy coping, and problem solving. We briefly discussed what exercise she is currently getting and she walks every day, she is not interested in chair aerobics or yoga at this time. RECOMMENDATIONS:  Ms. Anthony Leon would benefit from continuing to choose low carb foods coupled with protein for snacks. TOPICS DISCUSSED TODAY:  HOW CAN BLOOD GLUCOSE MONITORING HELP ME? 27  HOW DO MY DIABETES MEDICATIONS WORK? 30      Next provider visit is scheduled for 5/20/2022 with Dr. Jenni Weaver and 5/23/2022 for DSMES       SMART GOAL(S)  1. Practice building a balanced meal for dinner  at least 2 times over the next week  ACHIEVEMENT OF GOAL(S)  [x]??? 0-24%     []??? 25-49%     []? ?? 50-74%     []? ?? %  2.  2/7/2022 - same goal as last week.    ACHIEVEMENT OF GOAL(S)  [x]??? 0-24%     []??? 25-49%     []? ?? 50-74%     [x]? ?? %  3.  Scan Freestyle Jenn 2 at least 1x every 8 hours and scan fasting and before dinner    ACHIEVEMENT OF GOAL(S) :  50-74%  3. Contact insurance to see if Regular and NPH insulin are covered/free before next DSMES visit on  4/25/2022  ACHIEVEMENT OF GOAL(S) :  0-24%       DATE DSMES TOPIC EVALUATION     4/25/2022 HOW CAN BLOOD GLUCOSE MONITORING HELP ME?   a. Value of blood glucose monitoring   b. Realistic expectations   c. Blood glucose monitoring targets   d. Target adjustments   e. Setting a1c & blood glucose targets with provider   f. Meter selection    g. Technique for obtaining blood droplet   h. Blood glucose testing sites   i. Determining best times to test   j. Pregnancy recommendations   k. Data sharing with provider        The participant    Can demonstrate their glucometer procedure: Yes   Logs their BG readings:  Yes    The participant needs to address occasionally monitoring postprandial BGs to evaluate the impact of her meal choices and make adjustments as needed.      DATE DSMES TOPIC EVALUATION 4/25/2022 HOW DO MY DIABETES MEDICATIONS WORK?   a. Type 1 medications & methods    Insulin injections    Injection sites   b. Type 2 medications    Oral agents    GLP-1 agonists   c. Hypoglycemia symptoms & treatment    Glucagon emergency kits   d. General guidance regarding insulin use whether Type 1, 2 or gestational diabetes    Storage of insulin    Disposal     Traveling with medications   e. Barriers to medication adherence      The participant    Can describe the expected action & side effects of prescribed diabetes medications: Yes   Can demonstrate injection technique (if applicable): Yes    The participant needs to address disposing of sharps appropriately rather than placing used sharps in trash bags. Beverly Kearns RN on 4/25/2022 at 2:12 PM    I have personally reviewed the health record, including provider notes, laboratory data and current medications before making these care and education recommendations. The time spent in this effort is included in the total time.   Total minutes: 60

## 2022-05-20 ENCOUNTER — OFFICE VISIT (OUTPATIENT)
Dept: ENDOCRINOLOGY | Age: 72
End: 2022-05-20
Payer: MEDICAID

## 2022-05-20 VITALS
TEMPERATURE: 98 F | SYSTOLIC BLOOD PRESSURE: 143 MMHG | DIASTOLIC BLOOD PRESSURE: 75 MMHG | HEIGHT: 58 IN | BODY MASS INDEX: 29.18 KG/M2 | HEART RATE: 83 BPM | OXYGEN SATURATION: 95 % | RESPIRATION RATE: 19 BRPM | WEIGHT: 139 LBS

## 2022-05-20 DIAGNOSIS — I10 PRIMARY HYPERTENSION: ICD-10-CM

## 2022-05-20 DIAGNOSIS — E10.65 TYPE 1 DIABETES MELLITUS WITH HYPERGLYCEMIA (HCC): Primary | ICD-10-CM

## 2022-05-20 DIAGNOSIS — E78.2 MIXED HYPERLIPIDEMIA: ICD-10-CM

## 2022-05-20 DIAGNOSIS — E10.42 TYPE 1 DIABETES MELLITUS WITH DIABETIC POLYNEUROPATHY (HCC): ICD-10-CM

## 2022-05-20 PROCEDURE — G9717 DOC PT DX DEP/BP F/U NT REQ: HCPCS | Performed by: INTERNAL MEDICINE

## 2022-05-20 PROCEDURE — G8399 PT W/DXA RESULTS DOCUMENT: HCPCS | Performed by: INTERNAL MEDICINE

## 2022-05-20 PROCEDURE — 3017F COLORECTAL CA SCREEN DOC REV: CPT | Performed by: INTERNAL MEDICINE

## 2022-05-20 PROCEDURE — G8536 NO DOC ELDER MAL SCRN: HCPCS | Performed by: INTERNAL MEDICINE

## 2022-05-20 PROCEDURE — G8427 DOCREV CUR MEDS BY ELIG CLIN: HCPCS | Performed by: INTERNAL MEDICINE

## 2022-05-20 PROCEDURE — 1101F PT FALLS ASSESS-DOCD LE1/YR: CPT | Performed by: INTERNAL MEDICINE

## 2022-05-20 PROCEDURE — G8753 SYS BP > OR = 140: HCPCS | Performed by: INTERNAL MEDICINE

## 2022-05-20 PROCEDURE — 99214 OFFICE O/P EST MOD 30 MIN: CPT | Performed by: INTERNAL MEDICINE

## 2022-05-20 PROCEDURE — 1090F PRES/ABSN URINE INCON ASSESS: CPT | Performed by: INTERNAL MEDICINE

## 2022-05-20 PROCEDURE — 2022F DILAT RTA XM EVC RTNOPTHY: CPT | Performed by: INTERNAL MEDICINE

## 2022-05-20 PROCEDURE — G9899 SCRN MAM PERF RSLTS DOC: HCPCS | Performed by: INTERNAL MEDICINE

## 2022-05-20 PROCEDURE — 3052F HG A1C>EQUAL 8.0%<EQUAL 9.0%: CPT | Performed by: INTERNAL MEDICINE

## 2022-05-20 PROCEDURE — G8417 CALC BMI ABV UP PARAM F/U: HCPCS | Performed by: INTERNAL MEDICINE

## 2022-05-20 PROCEDURE — G8754 DIAS BP LESS 90: HCPCS | Performed by: INTERNAL MEDICINE

## 2022-05-20 NOTE — PROGRESS NOTES
No chief complaint on file.       Patient was last seen: 3 months ago -3/15/2022      General:   c-peptide is low    working with [facility]     Depression and stress affect sugars       A1c: last a1c was 8.8    DM Medications:   Novolin 70/30 insulin: 38 units breakfast, 19 units with dinner (rotates injection sites) - gives right before eating    Last Changes: see last office visit note    Sugar Checks: checks up to 2 x day -libre2 not covered     AM: see scanned readings IN MEDIA         PM: see scanned CGM reports     LOWs:  has low sugars 6%    DIET: eats what they want, not much education - is part of [facility] now    EXERCISE: exercise limited due to pain     HTN: at goal, on ARB     LIPIDS: at goal, on statin, lipids followed by PCP    RENAL: has normal renal function, has normal FELTON-r , in the past year, is on an ARB     EYES: overdue for eye exam, has no retinopathy     FEET: sees podiatry, has numbness, has tingling at times, has some pain, has hammer toes and bunions     DENTAL:  has no teeth     HEART:  no chest pain, shortness of breath or claudication, has no cardiac history     ASA:  is on aspirin     SYMPTOMS: no polyuria, thirst or blurred vision     THYROID: no known thyroid issue    DIABETES HISTORY:   From 1st visit 12/16/2021  Type 1 Diabetic diagnosed 1976 near when had oldest child  On insulin since then; though at one point told didn't need  Can also not take 70/30 insulin in AM and not be nathaly high by next dose which is odd  No other insulins except mixed insulin  Currently is free    LABS/STUDIES:  Lab Results   Component Value Date/Time    Hemoglobin A1c 8.8 (H) 03/15/2022 01:16 PM    Hemoglobin A1c 8.5 (H) 09/10/2021 02:35 PM    Hemoglobin A1c 8.3 (H) 09/19/2018 10:29 AM    Glucose 78 03/15/2022 01:16 PM    Glucose  05/23/2018 10:07 AM    GFR est AA 70 09/10/2021 02:35 PM    GFR est non-AA 61 09/10/2021 02:35 PM Microalbumin/Creat ratio (mg/g creat) 9 02/25/2015 06:30 PM    Microalb/Creat ratio (ug/mg creat.) 6 09/10/2021 02:35 PM    Microalbumin,urine random 0.95 02/25/2015 06:30 PM    Creatinine 0.91 03/15/2022 01:16 PM        Lab Results   Component Value Date/Time    Cholesterol, total 163 09/10/2021 02:35 PM    Triglyceride 150 (H) 09/10/2021 02:35 PM    HDL Cholesterol 57 09/10/2021 02:35 PM    LDL, calculated 80 09/10/2021 02:35 PM    LDL, calculated 73 01/14/2020 04:15 PM     Lab Results   Component Value Date/Time    C-Peptide 0.5 (L) 03/15/2022 01:16 PM            Past Medical History:   Diagnosis Date    Arthritis     Chronic left shoulder pain 5/10/2017    Chronic pain syndrome 9/27/2017    Cross-eye     Depression     Diabetes ([facility] [address]     Type 1    Flatulence     Hypercholesterolemia     Hypertension     Menopause     LMP-63years old?  Sinus congestion     Traumatic complete tear of right rotator cuff 9/24/2018 9/20/18, Dr. Nitesh Garvey: probable R torn rotator cuff. MRI ordered. MRI 11/14/18: complete tear of supraspinatus tendon. 2/28/19, Dr. Julia Simeon, right shoulder arthroscopy, rotator cuff repair. 7/1/19, Dr. Judd Vanna: repair is intact. Has glenohumeral OA at right shoulder causing stiffness and soreness, given cortisone shot        There were no vitals taken for this visit. Weight Metrics 3/29/2022 3/15/2022 12/16/2021 11/4/2021 6/25/2021 6/8/2021 10/21/2020   Weight 139 lb 8 oz 140 lb 142 lb 3.2 oz 143 lb 135 lb 134 lb 140 lb 3.2 oz   BMI 29.16 kg/m2 29.26 kg/m2 29.72 kg/m2 29.89 kg/m2 28.22 kg/m2 28.01 kg/m2 29.3 kg/m2        EXAM  -not done today    Assessment/Plan:   1.  Type 1 diabetes mellitus with hyperglycemia (HCC)  Still unwilling to switch insulins to MDI (N&R) so very limited to help her  Prior cgm report showed all the typical problems associated with mixed insulin  The only way is through improved diet now - when ever sugar is > 200 notate what ate the meal before, whether from treating a low, if having unusual stress or pain to cause it, etc  Still having some lows but, again, hard to manage with 70/30    2. Primary hypertension  On meds per PCP    3. Mixed hyperlipidemia  At goal on statin per PCP    No orders of the defined types were placed in this encounter.

## 2022-05-20 NOTE — PROGRESS NOTES
Identified pt with two pt identifiers(name and ). Reviewed record in preparation for visit and have obtained necessary documentation. Chief Complaint   Patient presents with    Diabetes    Follow-up      Vitals:    22 1409 22 1414   BP: (!) 150/83 (!) 143/75   Pulse: 83    Resp: 19    Temp: 98 °F (36.7 °C)    TempSrc: Oral    SpO2: 95%    Weight: 139 lb (63 kg)    Height: 4' 10\" (1.473 m)    PainSc:   0 - No pain        Allergies   Allergen Reactions    Compazine [Prochlorperazine Edisylate] Other (comments)     Tongue swelling, drooling    Naprosyn [Naproxen] Other (comments)     Belching,gas,stomach pain    Percocet [Oxycodone-Acetaminophen] Other (comments)     Visual problems/seeings in triplicate per patient       Current Outpatient Medications   Medication Instructions    Accu-Chek Jaja Plus test strp strip USE TO TEST BLOOD SUGARS 3 TIMES A DAY. DX: E10.9    Accu-Chek Softclix Lancets misc USE TO TEST BLOOD SUGAR 3 TIMES PER DAY FOR DX: E10.9    aspirin delayed-release 81 mg, Oral, DAILY    buPROPion SR (WELLBUTRIN SR) 150 mg SR tablet TAKE 1 TABLET BY MOUTH TWO TIMES A DAY.  cholecalciferol (vitamin D3) (VITAMIN D3) 2,000 Units, Oral    diclofenac EC (VOLTAREN) 50 mg, Oral, 2 TIMES DAILY AS NEEDED    gabapentin (NEURONTIN) 300 mg, Oral, 3 TIMES DAILY, Prescribed by Dr. Delfin Perez.  insulin NPH/insulin regular (HumuLIN 70/30 U-100 Insulin) 100 unit/mL (70-30) injection INJECT SUBCUTANEOUSLY 38 UNITS BEFORE BREAKFAST AND 19 UNITS BEFORE DINNER. DX: E10.9.  Insulin Syringe-Needle U-100 (BD Insulin Syringe Ultra-Fine) 0.5 mL 30 gauge x 1/2\" syrg USE TO INJECT INSULIN TWICE DAILY. DX: E10.9    irbesartan (AVAPRO) 150 mg tablet TAKE 1 TABLET BY MOUTH EVERY DAY    rosuvastatin (CRESTOR) 20 mg tablet TAKE 1 TABLET BY MOUTH EVERYDAY AT BEDTIME    terbinafine HCL (LAMISIL) 250 mg, Oral, DAILY       Health Maintenance Review: Patient reminded of \"due or due soon\" health maintenance. I have asked the patient to contact his/her primary care provider (PCP) for follow-up on his/her health maintenance. Immunization History   Administered Date(s) Administered    COVID-19, J&J, PF, 0.5 mL Dose 05/13/2021    Pneumococcal Conjugate (PCV-13) 02/13/2019    Pneumococcal Polysaccharide (PPSV-23) 10/21/2020    TD Vaccine 04/13/2010           Coordination of Care Questionnaire:  :   1) Have you been to an emergency room, urgent care, or hospitalized since your last visit? If yes, where when, and reason for visit? no       2. Have seen or consulted any other health care provider since your last visit? If yes, where when, and reason for visit? NO      Patient is accompanied by self I have received verbal consent from Venecia Kamara to discuss any/all medical information while they are present in the room.

## 2022-05-23 ENCOUNTER — CLINICAL SUPPORT (OUTPATIENT)
Dept: DIABETES SERVICES | Age: 72
End: 2022-05-23
Payer: MEDICARE

## 2022-05-23 DIAGNOSIS — E10.65 TYPE 1 DIABETES MELLITUS WITH HYPERGLYCEMIA (HCC): Primary | ICD-10-CM

## 2022-05-23 PROCEDURE — G0108 DIAB MANAGE TRN  PER INDIV: HCPCS

## 2022-05-23 NOTE — PROGRESS NOTES
[employer] [facility] [employer]  Diabetes Self-Management Education & Support Program  Encounter Note    SUMMARY  Diabetes self-care management training was completed related to healthy eating. he participant will return on September to continue DSMES related to TBD. The participant did not identify SMART Goal(s) and will practice knowledge and skills related to reducing risks, healthy eating and monitoring and medications to improve diabetes self-management. EVALUATION:  Ms. Dusty Wong BGs continue to occasionally run low before dinner, she is willing to record her most previous meal on her BG log on the days when her fasting or pre-dinner BG is out of range, either high or low, however she is not certain she will be able to use the information to make decisions about her carb choices at her next meal.  She states \"that's too much to remember. \"  She was unable to recall the target range for fasting/premeal BGs. We reviewed Healthy Plate and she remembered the aim of controlling carb portion, but not the sources of carbs. I reviewed with her where this information is in her DSMES patient handbook and gave her a post-it notes to put in her kitchen to remind her of the sources of carbs and BG targets. She still has the Healthy Plate modle we made together that she keeps in her kitchen, and at a previous appointment we created written carb appropriate menus based off of her food preferences. Ms. JORDY MONAHAN is unsure what else might be helpful, she has difficulty remembering the information and to use the tools from [facility] and is not able to plan her meals in advance, she relies on what her son decides to purchase at the store, she denies not having enough food to eat. She is trying to increase the frequency and distance that she walks during the week, this is largely dependent on the weather/temperature.   Yesterday she walked for about 30 minutes total, most days she is able to get in about 10 minutes if she walks around her yard/house to go to the mailbox. At this time she has 2 remaining hours of covered DSMES, she would like to meet again in September to discuss how she is doing. She is aware that she can reach me between now and the next appointment. I provided a BG log with space to record meals. RECOMMENDATIONS:  Ms. Joaquin Almodovar would benefit from keeping her list of foods that are carbs where she can see it when she is making her meals to help guide her choices. TOPICS DISCUSSED TODAY:  WHAT CAN I EAT? 60      Next provider visit is scheduled for September, date to be determined. SMART GOAL(S)  1. Practice building a balanced meal for dinner  at least 2 times over the next week  ACHIEVEMENT OF GOAL(S)  [x]???? 0-24%     []???? 25-49%     []???? 50-74%     []???? %    2.  2/7/2022 - same goal as last week.    ACHIEVEMENT OF GOAL(S)  [x]???? 0-24%     []???? 25-49%     []???? 50-74%     [x]? ??? %    3.  Scan Freestyle Jenn 2 at least 1x every 8 hours and scan fasting and before dinner    ACHIEVEMENT OF GOAL(S) :  50-74%    4.   Contact insurance to see if Regular and NPH insulin are covered/free before next DSMES visit on  4/25/2022  ACHIEVEMENT OF GOAL(S) :  0-24%       DATE DSMES TOPIC EVALUATION     5/23/2022 WHAT CAN I EAT?   a. General principles   b. Determining a healthy weight   c. Nutritional terms & tools    Healthy Plate method    Carbohydrate Counting    Reading food labels    Free apps   d. Pregnancy recommendations      The participant    Uses Healthy Plate principles in constructing meals: No, continuing to work on this.  Reads food labels in choosing acceptable foods: No    The participant needs to address using tools from [facility] when building meals.      Vicenta Diallo RN on 5/23/2022 at 2:44 PM    I have personally reviewed the health record, including provider notes, laboratory data and current medications before making these care and education recommendations. The time spent in this effort is included in the total time.   Total minutes: 60

## 2022-06-28 ENCOUNTER — TRANSCRIBE ORDER (OUTPATIENT)
Dept: SCHEDULING | Age: 72
End: 2022-06-28

## 2022-06-28 DIAGNOSIS — Z12.31 SCREENING MAMMOGRAM FOR HIGH-RISK PATIENT: Primary | ICD-10-CM

## 2022-08-03 DIAGNOSIS — E10.42 TYPE 1 DIABETES MELLITUS WITH DIABETIC POLYNEUROPATHY (HCC): ICD-10-CM

## 2022-08-03 DIAGNOSIS — M25.551 RIGHT HIP PAIN: ICD-10-CM

## 2022-08-03 DIAGNOSIS — M15.9 PRIMARY OSTEOARTHRITIS INVOLVING MULTIPLE JOINTS: ICD-10-CM

## 2022-08-03 RX ORDER — SYRINGE-NEEDLE,INSULIN,0.5 ML 30 G X1/2"
SYRINGE, EMPTY DISPOSABLE MISCELLANEOUS
Qty: 100 EACH | Refills: 6 | Status: SHIPPED | OUTPATIENT
Start: 2022-08-03

## 2022-08-03 RX ORDER — DICLOFENAC SODIUM 50 MG/1
50 TABLET, DELAYED RELEASE ORAL
Qty: 60 TABLET | Refills: 3 | Status: SHIPPED | OUTPATIENT
Start: 2022-08-03

## 2022-09-12 ENCOUNTER — CLINICAL SUPPORT (OUTPATIENT)
Dept: DIABETES SERVICES | Age: 72
End: 2022-09-12
Payer: MEDICARE

## 2022-09-12 DIAGNOSIS — E10.65 TYPE 1 DIABETES MELLITUS WITH HYPERGLYCEMIA (HCC): Primary | ICD-10-CM

## 2022-09-12 PROCEDURE — G0108 DIAB MANAGE TRN  PER INDIV: HCPCS

## 2022-09-12 NOTE — PROGRESS NOTES
[employer] [facility]  Diabetes Self-Management Education & Support Program  Post-program Evaluation    EVALUATION @ COMPLETION OF THE PROGRAM    Ella Weston completed 9 hours of diabetes self-management education. The participant acquired new knowledge and demonstrated new skills during the program.     The participant worked on the following SMART GOAL(s) to improve their diabetes self-management:     Practice building a balanced meal for dinner  at least 2 times over the next week  ACHIEVEMENT OF GOAL(S) : 0-24%    2. Practice building a balanced meal for dinner  at least 2 times over the next week  ACHIEVEMENT OF GOAL(S) : 50-74%    3. Contact insurance to see if Regular and NPH insulin are covered/[facility]  ACHIEVEMENT OF GOAL(S) : 0-24%    The participant's pre-program A1c was   Lab Results   Component Value Date/Time    Hemoglobin A1c 8.5 (H) 09/10/2021    Hemoglobin A1c (POC) 9.0 06/25/2021 01:40 PM   . The post-evaluation A1c is 8.8%. The participant improved their Diabetes Skills, Confidence and Preparedness Index (scored out of 7): Total score: 5.3  Skills: 5.1  Confidence: 4.9  Preparedness: 6.1    FINAL RECOMMENDATIONS:  Since her first appointment in January 2022, Ms. Enedelia Hannon has made some improvement to her nutrition, but her decisions are based on the food is available to her. Shopping and meal prep is primarily handled by her son and my impression is that she does not have much input on either. Ms. Enedelia Hannon occasionally uses Healthy Plate as a model, but struggles to remember which foods are sources of carbs. She has demonstrated the ability to use the [facility] patient handbook, menus created in sessions, and other tools as resources. However, she shared that thinking about nutrition is simply one more stressor in her life. She is not ready to try a different insulin, she would like to stay on humulin 70/30.  She takes her insulin as directed and does not miss doses. Ms. Ebony Lazaro is doing a great job of monitoring 2x/daily and logging the results. She often makes notes of what she has eaten that may have induced a higher than expected BG. She is having fewer before dinner lows than in previous months. Overall, her fasting and pre-dinner BGs remain unchanged. When asked, she states she does not know what else she can do to to improve her DM self-management or what can be done for her to improve her self-management. My recommendation is to continue to encourage her to use her DSMES handbook, resources, and the Healthy Plate model to the best of her ability and as often as possible. Next provider visit is scheduled for 9/23/2022 with Dr. Randell Oh, 10/12/2022 with Dr. Garret Hope, 12/19/2022 - pt requested DSMES appt.,        Efren Camacho RN on 9/12/2022     Metric Patient's responses (9/12/2022) Areas for improvement     Healthy Eating       The participant is using the Healthy Plate to control carbohydrate intake - Yes    The participant reads food labels accurately - No     Ms. Ebony Lazaro has her Healthy Plate model in her kitchen and states she does think about it when making meals. She does not use her patient handbook as a resource for the sources of carbs. She is unable to name the major sources of carbs. Being Active       The participant performs physical activity where your heart beats faster and your breathing is harder than normal for 30 minutes or more? 1 day(s)     In a typical week, the participant performs physical activity 1 day(s)     She walks to [facility] at least 2x per week, if the weather is favorable. She is not inclined to commit to working on increasing PA at this time. Monitoring   The participant is monitoring their blood sugars?  Yes    The participant is monitoring 2x/day    Blood sugar ranges are  mg/dL  BEFORE Breakfast: 120s-200 mg/dL most of the time      BEFORE Dinner: 591Y-289 mg/dL most of the time      The participant improved their A1c - No    The participant understands the meaning of the A1c - Yes     Ms. Weston is unable to use BGs to prompt behavior changes. She does a great job of logging her BGs 2x/day and writing notes about her food choices when the results are particularly high, but does change eating habits based on results. Taking Medications   The participant understands what their diabetes medications do No    The participant can afford your diabetes medications Yes    The participant does not miss doses of their diabetes medications - never     Depite DSMES focused on her insulin, Ms. Jazzmine Ash is unable to remember how it works in her body. Healthy Coping     The participant feels supported by family, friends and others related to their diabetes self-care practices - No    The participant plans on utilizing the following community resources after completion of the program:  Health care team        Reducing Risks   Vaccines:  Influenza: There is no immunization history for the selected administration types on file for this patient. Pneumococcal:   Immunization History   Administered Date(s) Administered    Pneumococcal Conjugate (PCV-13) 02/13/2019    Pneumococcal Polysaccharide (PPSV-23) 10/21/2020        Hepatitis: There is no immunization history for the selected administration types on file for this patient.     Examinations:  Diabetic Foot and Eye Exam HM Status   Topic Date Due    Eye Exam  01/27/2021    Diabetic Foot Care  06/25/2022        Dental exam:  pt has dentures, does not see dentist or oral care provider    Foot exam: due    Heart Protection:  BP Readings from Last 2 Encounters:   05/20/22 (!) 143/75   03/29/22 129/78        Lab Results   Component Value Date/Time    LDL, calculated 80 09/10/2021 02:35 PM    LDL, calculated 73 01/14/2020 04:15 PM        Key Anti-Platelet Anticoagulant Meds               aspirin delayed-release 81 mg tablet Take 1 Tab by mouth daily. Kidney Protection:  Lab Results   Component Value Date/Time    Microalbumin/Creat ratio (mg/g creat) 9 02/25/2015 06:30 PM    Microalb/Creat ratio (ug/mg creat.) 6 09/10/2021 02:35 PM    Microalbumin,urine random 0.95 02/25/2015 06:30 PM      The participant would benefit from additional attention to:    Vaccines:  [x] Influenza  [] Pneumococcal  [] Hepatitis    Examinations:  [] Dilated eye exam  [] Dental exam  [x] Foot exam    Other:  [] Reviewing long-term complications     Problem Solving   Hypoglycemia Management:  The participant knows the signs and symptoms of hypoglycemia: Weakness, Nervous feeling    The participant knows how to prevent hypoglycemia: take medications as instructed    The participant knows how to treat hypoglycemia: Rule of 15 and pt does not always implement Rule of 15    Hyperglycemia Management:  The participant knows their signs and symptoms of hyperglycemia: Pt reported being unaware of s/s of hyperglycemia     The participant knows how to prevent hyperglycemia: take medications as instructed and focus on carbohydrate counting/meal planning    Sick Day Management:  The participant knows what to do differently on sick days: Check blood glucose every 2-4 hours     Pattern Management:  The participant can notice blood glucose patterns when looking at their blood glucose readings: No    How do you use the blood glucose readings from your meter or logbook:  Despite repeated education, pt is unable to use BG results to inform behavior change. Ms. Yung Walls is experiencing fewer lows before dinner than she was in the Spring, however she does not consistently use the 15-15 Rule to treat hypoglycemia. Because she checks her BG before dinner, she typically eats her planned meal regardless of BG.    Note: Content derived from the American Association of Diabetes Educators' Diabetes Education Curriculum: A Guide to Successful Self-Management (3rd edition)      I have personally reviewed the health record, including provider notes, laboratory data and current medications before making these care and education recommendations. The time spent in this effort is included in the total time.   Total minutes: 60

## 2022-09-23 ENCOUNTER — OFFICE VISIT (OUTPATIENT)
Dept: ENDOCRINOLOGY | Age: 72
End: 2022-09-23
Payer: MEDICARE

## 2022-09-23 VITALS
HEIGHT: 58 IN | HEART RATE: 83 BPM | SYSTOLIC BLOOD PRESSURE: 114 MMHG | WEIGHT: 139 LBS | DIASTOLIC BLOOD PRESSURE: 72 MMHG | BODY MASS INDEX: 29.18 KG/M2

## 2022-09-23 DIAGNOSIS — E10.65 TYPE 1 DIABETES MELLITUS WITH HYPERGLYCEMIA (HCC): Primary | ICD-10-CM

## 2022-09-23 PROCEDURE — G8399 PT W/DXA RESULTS DOCUMENT: HCPCS | Performed by: INTERNAL MEDICINE

## 2022-09-23 PROCEDURE — G8754 DIAS BP LESS 90: HCPCS | Performed by: INTERNAL MEDICINE

## 2022-09-23 PROCEDURE — G8417 CALC BMI ABV UP PARAM F/U: HCPCS | Performed by: INTERNAL MEDICINE

## 2022-09-23 PROCEDURE — G8752 SYS BP LESS 140: HCPCS | Performed by: INTERNAL MEDICINE

## 2022-09-23 PROCEDURE — 99214 OFFICE O/P EST MOD 30 MIN: CPT | Performed by: INTERNAL MEDICINE

## 2022-09-23 PROCEDURE — G8427 DOCREV CUR MEDS BY ELIG CLIN: HCPCS | Performed by: INTERNAL MEDICINE

## 2022-09-23 PROCEDURE — 3052F HG A1C>EQUAL 8.0%<EQUAL 9.0%: CPT | Performed by: INTERNAL MEDICINE

## 2022-09-23 PROCEDURE — G9717 DOC PT DX DEP/BP F/U NT REQ: HCPCS | Performed by: INTERNAL MEDICINE

## 2022-09-23 PROCEDURE — 3017F COLORECTAL CA SCREEN DOC REV: CPT | Performed by: INTERNAL MEDICINE

## 2022-09-23 PROCEDURE — 1123F ACP DISCUSS/DSCN MKR DOCD: CPT | Performed by: INTERNAL MEDICINE

## 2022-09-23 PROCEDURE — 1101F PT FALLS ASSESS-DOCD LE1/YR: CPT | Performed by: INTERNAL MEDICINE

## 2022-09-23 PROCEDURE — 2022F DILAT RTA XM EVC RTNOPTHY: CPT | Performed by: INTERNAL MEDICINE

## 2022-09-23 PROCEDURE — G9899 SCRN MAM PERF RSLTS DOC: HCPCS | Performed by: INTERNAL MEDICINE

## 2022-09-23 PROCEDURE — G8536 NO DOC ELDER MAL SCRN: HCPCS | Performed by: INTERNAL MEDICINE

## 2022-09-23 PROCEDURE — 1090F PRES/ABSN URINE INCON ASSESS: CPT | Performed by: INTERNAL MEDICINE

## 2022-09-23 NOTE — LETTER
9/25/2022    Patient: Luis Adams   YOB: 1950   Date of Visit: 9/23/2022     Jackson Denver, MD  [address] [id number]  [place]    Dear Jackson Denver, MD,      Thank you for referring Ms. Basil Holcomb to [facility] for evaluation. My notes for this consultation are attached. If you have questions, please do not hesitate to call me. I look forward to following your patient along with you.       Sincerely,    Liana Pichardo MD

## 2022-09-23 NOTE — PROGRESS NOTES
Chief Complaint   Patient presents with    Diabetes       Patient was last seen: 4 months ago -5/20/2022       General:   Nothing new    c-peptide is low    working with [facility]     Depression and stress affect sugars       A1c: last a1c was 8.8    DM Medications:   Novolin 70/30 insulin: 38 units breakfast, 19 units with dinner (rotates injection sites) - gives right before eating    Last Changes: no changes at last visit    Sugar Checks: checks up to 2 x day -libre2 not covered     AM: see scanned readings IN MEDIA       PM: see above     LOWs:  has low sugars 6%    DIET: eats what they want, not much education - is part of [facility] now    EXERCISE: exercise limited due to pain     HTN: at goal, on ARB     LIPIDS: at goal, on statin, lipids followed by PCP    RENAL: has normal renal function, has normal FELTON-r , in the past year, is on an ARB     EYES: eye exam in past year, has no retinopathy     FEET: sees podiatry, has numbness, has tingling at times, has some pain, has hammer toes and bunions     DENTAL:  has no teeth     HEART:  no chest pain, shortness of breath or claudication, has no cardiac history     ASA:  is on aspirin     SYMPTOMS: no polyuria, thirst or blurred vision     THYROID: no known thyroid issue    DIABETES HISTORY:   From 1st visit 12/16/2021  Type 1 Diabetic diagnosed 1976 near when had oldest child  On insulin since then; though at one point told didn't need  Can also not take 70/30 insulin in AM and not be nathaly high by next dose which is odd  No other insulins except mixed insulin  Currently is free    LABS/STUDIES:  Lab Results   Component Value Date/Time    Hemoglobin A1c 8.8 (H) 03/15/2022 01:16 PM    Hemoglobin A1c 8.5 (H) 09/10/2021 02:35 PM    Hemoglobin A1c 8.3 (H) 09/19/2018 10:29 AM    Glucose 78 03/15/2022 01:16 PM    Glucose  05/23/2018 10:07 AM    GFR est AA 70 09/10/2021 02:35 PM    GFR est non-AA 61 09/10/2021 02:35 PM    Microalbumin/Creat ratio (mg/g creat) 9 02/25/2015 06:30 PM    Microalb/Creat ratio (ug/mg creat.) 6 09/10/2021 02:35 PM    Microalbumin,urine random 0.95 02/25/2015 06:30 PM    Creatinine 0.91 03/15/2022 01:16 PM        Lab Results   Component Value Date/Time    Cholesterol, total 163 09/10/2021 02:35 PM    Triglyceride 150 (H) 09/10/2021 02:35 PM    HDL Cholesterol 57 09/10/2021 02:35 PM    LDL, calculated 80 09/10/2021 02:35 PM    LDL, calculated 73 01/14/2020 04:15 PM     Lab Results   Component Value Date/Time    C-Peptide 0.5 (L) 03/15/2022 01:16 PM            Past Medical History:   Diagnosis Date    Arthritis     Chronic left shoulder pain 5/10/2017    Chronic pain syndrome 9/27/2017    Cross-eye     Depression     Diabetes ([facility] [address]     Type 1    Flatulence     Hypercholesterolemia     Hypertension     Menopause     LMP-63years old? Sinus congestion     Traumatic complete tear of right rotator cuff 9/24/2018 9/20/18, Dr. Mile Neal: probable R torn rotator cuff. MRI ordered. MRI 11/14/18: complete tear of supraspinatus tendon. 2/28/19, Dr. Whitney Mora, right shoulder arthroscopy, rotator cuff repair. 7/1/19, Dr. Venecia Gresham: repair is intact. Has glenohumeral OA at right shoulder causing stiffness and soreness, given cortisone shot        Blood pressure 114/72, pulse 83, height 4' 10\" (1.473 m), weight 139 lb (63 kg). Weight Metrics 9/23/2022 5/20/2022 3/29/2022 3/15/2022 12/16/2021 11/4/2021 6/25/2021   Weight 139 lb 139 lb 139 lb 8 oz 140 lb 142 lb 3.2 oz 143 lb 135 lb   BMI 29.05 kg/m2 29.05 kg/m2 29.16 kg/m2 29.26 kg/m2 29.72 kg/m2 29.89 kg/m2 28.22 kg/m2        EXAM  -not done today    Assessment/Plan:   1.  Type 1 diabetes mellitus with hyperglycemia (HCC)  Still unwilling to switch insulins to MDI (N&R) so very limited to help her  Prior cgm report showed all the typical problems associated with mixed insulin  The only way is through improved diet now - when ever sugar is > 200 notate what ate the meal before, whether from treating a low, if having unusual stress or pain to cause it, etc  Still having some lows but, again, hard to manage with 70/30    2. Primary hypertension  On meds per PCP    3. Mixed hyperlipidemia  At goal on statin per PCP    Orders Placed This Encounter    HEMOGLOBIN A1C WITH EAG    METABOLIC PANEL, BASIC        Follow-up and Dispositions    Return in about 4 months (around 1/23/2023).

## 2022-11-04 ENCOUNTER — OFFICE VISIT (OUTPATIENT)
Dept: INTERNAL MEDICINE CLINIC | Age: 72
End: 2022-11-04
Payer: MEDICARE

## 2022-11-04 VITALS
HEART RATE: 68 BPM | BODY MASS INDEX: 29.52 KG/M2 | DIASTOLIC BLOOD PRESSURE: 78 MMHG | OXYGEN SATURATION: 94 % | WEIGHT: 140.6 LBS | SYSTOLIC BLOOD PRESSURE: 121 MMHG | TEMPERATURE: 98.5 F | HEIGHT: 58 IN | RESPIRATION RATE: 14 BRPM

## 2022-11-04 DIAGNOSIS — M25.50 ARTHRALGIA, UNSPECIFIED JOINT: ICD-10-CM

## 2022-11-04 DIAGNOSIS — L29.9 PRURITUS OF SCALP: ICD-10-CM

## 2022-11-04 DIAGNOSIS — E55.9 VITAMIN D DEFICIENCY: ICD-10-CM

## 2022-11-04 DIAGNOSIS — E10.42 TYPE 1 DIABETES MELLITUS WITH DIABETIC POLYNEUROPATHY (HCC): ICD-10-CM

## 2022-11-04 DIAGNOSIS — E78.2 MIXED HYPERLIPIDEMIA: ICD-10-CM

## 2022-11-04 DIAGNOSIS — F33.0 MILD EPISODE OF RECURRENT MAJOR DEPRESSIVE DISORDER (HCC): ICD-10-CM

## 2022-11-04 DIAGNOSIS — Z00.00 MEDICARE ANNUAL WELLNESS VISIT, SUBSEQUENT: Primary | ICD-10-CM

## 2022-11-04 DIAGNOSIS — I10 ESSENTIAL HYPERTENSION: ICD-10-CM

## 2022-11-04 LAB — HBA1C MFR BLD HPLC: 7.8 %

## 2022-11-04 PROCEDURE — G9717 DOC PT DX DEP/BP F/U NT REQ: HCPCS | Performed by: INTERNAL MEDICINE

## 2022-11-04 PROCEDURE — G8754 DIAS BP LESS 90: HCPCS | Performed by: INTERNAL MEDICINE

## 2022-11-04 PROCEDURE — 2022F DILAT RTA XM EVC RTNOPTHY: CPT | Performed by: INTERNAL MEDICINE

## 2022-11-04 PROCEDURE — G8417 CALC BMI ABV UP PARAM F/U: HCPCS | Performed by: INTERNAL MEDICINE

## 2022-11-04 PROCEDURE — G0439 PPPS, SUBSEQ VISIT: HCPCS | Performed by: INTERNAL MEDICINE

## 2022-11-04 PROCEDURE — G9899 SCRN MAM PERF RSLTS DOC: HCPCS | Performed by: INTERNAL MEDICINE

## 2022-11-04 PROCEDURE — G8399 PT W/DXA RESULTS DOCUMENT: HCPCS | Performed by: INTERNAL MEDICINE

## 2022-11-04 PROCEDURE — 1101F PT FALLS ASSESS-DOCD LE1/YR: CPT | Performed by: INTERNAL MEDICINE

## 2022-11-04 PROCEDURE — 3074F SYST BP LT 130 MM HG: CPT | Performed by: INTERNAL MEDICINE

## 2022-11-04 PROCEDURE — 1123F ACP DISCUSS/DSCN MKR DOCD: CPT | Performed by: INTERNAL MEDICINE

## 2022-11-04 PROCEDURE — 99214 OFFICE O/P EST MOD 30 MIN: CPT | Performed by: INTERNAL MEDICINE

## 2022-11-04 PROCEDURE — 3017F COLORECTAL CA SCREEN DOC REV: CPT | Performed by: INTERNAL MEDICINE

## 2022-11-04 PROCEDURE — G8427 DOCREV CUR MEDS BY ELIG CLIN: HCPCS | Performed by: INTERNAL MEDICINE

## 2022-11-04 PROCEDURE — 83036 HEMOGLOBIN GLYCOSYLATED A1C: CPT | Performed by: INTERNAL MEDICINE

## 2022-11-04 PROCEDURE — 3051F HG A1C>EQUAL 7.0%<8.0%: CPT | Performed by: INTERNAL MEDICINE

## 2022-11-04 PROCEDURE — 3078F DIAST BP <80 MM HG: CPT | Performed by: INTERNAL MEDICINE

## 2022-11-04 PROCEDURE — G8752 SYS BP LESS 140: HCPCS | Performed by: INTERNAL MEDICINE

## 2022-11-04 PROCEDURE — 1090F PRES/ABSN URINE INCON ASSESS: CPT | Performed by: INTERNAL MEDICINE

## 2022-11-04 PROCEDURE — G8536 NO DOC ELDER MAL SCRN: HCPCS | Performed by: INTERNAL MEDICINE

## 2022-11-04 RX ORDER — MOMETASONE FUROATE 1 MG/ML
SOLUTION TOPICAL
Qty: 30 ML | Refills: 0 | Status: SHIPPED | OUTPATIENT
Start: 2022-11-04 | End: 2022-11-29

## 2022-11-04 NOTE — PATIENT INSTRUCTIONS
Medicare Wellness Visit, Female     The best way to live healthy is to have a lifestyle where you eat a well-balanced diet, exercise regularly, limit alcohol use, and quit all forms of tobacco/nicotine, if applicable. Regular preventive services are another way to keep healthy. Preventive services (vaccines, screening tests, monitoring & exams) can help personalize your care plan, which helps you manage your own care. Screening tests can find health problems at the earliest stages, when they are easiest to treat. [facility] follows the current, evidence-based guidelines published by the [facility] Carl ([facility]) when recommending preventive services for our patients. Because we follow these guidelines, sometimes recommendations change over time as research supports it. (For example, mammograms used to be recommended annually. Even though Medicare will still pay for an annual mammogram, the newer guidelines recommend a mammogram every two years for women of average risk). Of course, you and your doctor may decide to screen more often for some diseases, based on your risk and your co-morbidities (chronic disease you are already diagnosed with). Preventive services for you include:  - Medicare offers their members a free annual wellness visit, which is time for you and your primary care provider to discuss and plan for your preventive service needs. Take advantage of this benefit every year!  -All adults over the age of 72 should receive the recommended pneumonia vaccines. Current USPSTF guidelines recommend a series of two vaccines for the best pneumonia protection.   -All adults should have a flu vaccine yearly and a tetanus vaccine every 10 years.   -All adults age 48 and older should receive the shingles vaccines (series of two vaccines).       -All adults age 38-68 who are overweight should have a diabetes screening test once every three years.   -All adults born between [birth year] and 1964 should be screened once for Hepatitis C.  -Other screening tests and preventive services for persons with diabetes include: an eye exam to screen for diabetic retinopathy, a kidney function test, a foot exam, and stricter control over your cholesterol.   -Cardiovascular screening for adults with routine risk involves an electrocardiogram (ECG) at intervals determined by your doctor.   -Colorectal cancer screenings should be done for adults age 54-65 with no increased risk factors for colorectal cancer. There are a number of acceptable methods of screening for this type of cancer. Each test has its own benefits and drawbacks. Discuss with your doctor what is most appropriate for you during your annual wellness visit. The different tests include: colonoscopy (considered the best screening method), a fecal occult blood test, a fecal DNA test, and sigmoidoscopy.    -A bone mass density test is recommended when a woman turns 65 to screen for osteoporosis. This test is only recommended one time, as a screening. Some providers will use this same test as a disease monitoring tool if you already have osteoporosis. -Breast cancer screenings are recommended every other year for women of normal risk, age 54-69.  -Cervical cancer screenings for women over age 72 are only recommended with certain risk factors.      Here is a list of your current Health Maintenance items (your personalized list of preventive services) with a due date:  Health Maintenance Due   Topic Date Due    Eye Exam  01/27/2021    COVID-19 Vaccine (2 - Booster for Kandy series) 07/08/2021    Diabetic Foot Care  06/25/2022    Yearly Flu Vaccine (1) Never done    Albumin Urine Test  09/10/2022    Cholesterol Test   09/10/2022

## 2022-11-04 NOTE — PROGRESS NOTES
CC:   Chief Complaint   Patient presents with    Annual Wellness Visit    Diabetes       HISTORY OF PRESENT ILLNESS  Toribio Faulkner is a 71 y.o. female. Presents for Medicare AWV and follow up evaluation. She has type 1 DM, HTN, hyperlipidemia, OA at multiple joints, and seasonal allergic rhinitis. Complains of pain all over, especially feet, hands, and knees. Has itching at scalp. Wants 50 ml insulin syringes, currently gets 100 ml size. Saw Dr. Danielle Harley for DM. Wanted to change the type of insulin she takes but patient wants to stay on 70/30. Denies polydipsia, polyuria, or paresthesias at feet. Has occasional hypoglycemia in the 60's in the afternoons. A1c 7.8% today (11/4/22), was 8.8% on 3/15/22, 8.5% on 9/10/21, and 9.0% on 6/24/21. Eye exam:  3/2022, Dr. Cindi Bass, [facility]. Denies HA's, CP, SOB, dizziness, heart palpitations, or leg swelling. Home BP monitoring: not done. Patient Active Problem List   Diagnosis Code    Type 1 diabetes mellitus with diabetic polyneuropathy ([facility] [address] E10.42    Essential hypertension I10    Mixed hyperlipidemia E78.2    Allergic rhinitis J30.9    Vitamin D deficiency E55.9    Primary osteoarthritis involving multiple joints M15.9    Obesity (BMI 30-39. 9) E66.9    Osteopenia M85.80    Mild episode of recurrent major depressive disorder ([address] F33.0    Repeated falls R29.6     Past Medical History:   Diagnosis Date    Arthritis     Chronic left shoulder pain 5/10/2017    Chronic pain syndrome 9/27/2017    Cross-eye     Depression     Diabetes ([facility] [address]     Type 1    Flatulence     Hypercholesterolemia     Hypertension     Menopause     LMP-63years old? Sinus congestion     Traumatic complete tear of right rotator cuff 9/24/2018 9/20/18, Dr. Lyn Files: probable R torn rotator cuff. MRI ordered. MRI 11/14/18: complete tear of supraspinatus tendon. 2/28/19, Dr. Axel Ross, right shoulder arthroscopy, rotator cuff repair. 7/1/19, Dr. Reny De Paz: repair is intact. Has glenohumeral OA at right shoulder causing stiffness and soreness, given cortisone shot     Allergies   Allergen Reactions    Compazine [Prochlorperazine Edisylate] Other (comments)     Tongue swelling, drooling    Naprosyn [Naproxen] Other (comments)     Belching,gas,stomach pain    Percocet [Oxycodone-Acetaminophen] Other (comments)     Visual problems/seeings in triplicate per patient       Current Outpatient Medications   Medication Sig Dispense Refill    Accu-Chek Softclix Lancets misc USE TO TEST BLOOD SUGAR 3 TIMES PER DAY FOR DX: E10.9 100 Lancet 11    insulin NPH/insulin regular (HumuLIN 70/30 U-100 Insulin) 100 unit/mL (70-30) injection INJECT SUBCUTANEOUSLY 37 UNITS BEFORE BREAKFAST AND 16 UNITS BEFORE DINNER. DX: E10.9. (Patient taking differently: INJECT SUBCUTANEOUSLY 38 UNITS BEFORE BREAKFAST AND 19 UNITS BEFORE DINNER. DX: E10.9.) 50 mL 2    diclofenac EC (VOLTAREN) 50 mg EC tablet TAKE 1 TABLET BY MOUTH TWO (2) TIMES DAILY AS NEEDED FOR PAIN. 60 Tablet 3    Insulin Syringe-Needle U-100 (BD Insulin Syringe Ultra-Fine) 0.5 mL 30 gauge x 1/2\" syrg USE TO INJECT INSULIN TWICE DAILY. DX: E10.9 100 Each 6    buPROPion SR (WELLBUTRIN SR) 150 mg SR tablet TAKE 1 TABLET BY MOUTH TWO TIMES A DAY. 180 Tablet 3    rosuvastatin (CRESTOR) 20 mg tablet TAKE 1 TABLET BY MOUTH EVERYDAY AT BEDTIME 90 Tablet 3    irbesartan (AVAPRO) 150 mg tablet TAKE 1 TABLET BY MOUTH EVERY DAY 90 Tablet 3    Accu-Chek Jaja Plus test strp strip USE TO TEST BLOOD SUGARS 3 TIMES A DAY. DX: E10.9 300 Strip 3    gabapentin (NEURONTIN) 300 mg capsule Take 300 mg by mouth three (3) times daily. Prescribed by Dr. Memo Taylor. 60 Capsule 0    aspirin delayed-release 81 mg tablet Take 1 Tab by mouth daily. 90 Tab 3    cholecalciferol, vitamin D3, 50 mcg (2,000 unit) tab Take 2,000 Units by mouth.            PHYSICAL EXAM  Visit Vitals  /78 (BP 1 Location: Left upper arm, BP Patient Position: Sitting, BP Cuff Size: Adult)   Pulse 68   Temp 98.5 °F (36.9 °C) (Oral)   Resp 14   Ht 4' 10\" (1.473 m)   Wt 140 lb 9.6 oz (63.8 kg)   SpO2 94%   BMI 29.39 kg/m²       General: Well-developed and well-nourished, no distress. HEENT:  Head normocephalic/atraumatic, no scleral icterus  Lungs:  Clear to ausculation bilaterally. Good air movement. Heart:  Regular rate and rhythm, normal S1 and S2, no murmur, gallop, or rub  Extremities: No clubbing, cyanosis, or edema. Neurological: Alert and oriented. Psychiatric: Normal mood and affect. Behavior is normal.   Diabetic foot exam:     Left Foot:   Visual Exam: callous - shaved calluses at plantar surface, marked bunion with hammertoes and overlapping toes   Pulse DP: 2+ (normal)   Filament test: normal sensation    Vibratory sensation: normal      Right Foot:   Visual Exam: callous - shaved calluses at plantar surface, marked bunion with hammertoes and overlapping toes   Pulse DP: 2+ (normal)   Filament test: normal sensation    Vibratory sensation: normal      Results for orders placed or performed in visit on 11/04/22   AMB POC HEMOGLOBIN A1C   Result Value Ref Range    Hemoglobin A1c (POC) 7.8 %         ASSESSMENT AND PLAN    ICD-10-CM ICD-9-CM    1. Medicare annual wellness visit, subsequent  Z00.00 V70.0       2. Type 1 diabetes mellitus with diabetic polyneuropathy (HCC)  E10.42 250.61 AMB POC HEMOGLOBIN A1C     357.2  DIABETES FOOT EXAM      MICROALBUMIN, UR, RAND W/ MICROALB/CREAT RATIO      MICROALBUMIN, UR, RAND W/ MICROALB/CREAT RATIO      insulin NPH/insulin regular (HumuLIN 70/30 U-100 Insulin) 100 unit/mL (70-30) injection      3. Essential hypertension  I10 401.9       4. Mixed hyperlipidemia  E78.2 272.2 LIPID PANEL      5. Vitamin D deficiency  E55.9 268.9 VITAMIN D, 25 HYDROXY      6. Mild episode of recurrent major depressive disorder (HCC)  F33.0 296.31       7.  Arthralgia, unspecified joint  M25.50 719.40 RA + CCP ABS      SED RATE (ESR)      CANDIE COMPREHENSIVE PANEL      8. Pruritus of scalp  L29.9 698.9 mometasone (ELOCON) 0.1 % lotion        Diagnoses and all orders for this visit:    1. Medicare annual wellness visit, subsequent    2. Type 1 diabetes mellitus with diabetic polyneuropathy (HCC)  A1c 7.8% today, better controlled. Continue 70/30 insulin. Follow up with Dr. Elsie Foreman.  -     AMB POC HEMOGLOBIN A1C  -      DIABETES FOOT EXAM  -     MICROALBUMIN, UR, RAND W/ MICROALB/CREAT RATIO; Future  -     insulin NPH/insulin regular (HumuLIN 70/30 U-100 Insulin) 100 unit/mL (70-30) injection; INJECT SUBCUTANEOUSLY 38 UNITS BEFORE BREAKFAST AND 19 UNITS BEFORE DINNER. DX: E10.9.    3. Essential hypertension  Controlled. Continue present management. 4. Mixed hyperlipidemia  -     LIPID PANEL; Future    5. Vitamin D deficiency  -     VITAMIN D, 25 HYDROXY; Future    6. Mild episode of recurrent major depressive disorder ([facility] [address]    7. Arthralgia, unspecified joint  Rule out RA, lupus, or inflammatory arthritis. -     RA + CCP ABS; Future  -     SED RATE (ESR); Future  -     CANDIE COMPREHENSIVE PANEL; Future    8. Pruritus of scalp  -     Start mometasone (ELOCON) 0.1 % lotion; Apply once daily to itchy areas of scalp. Indications: psoriasis of scalp      Follow-up and Dispositions    Return in about 6 months (around 5/4/2023), or if symptoms worsen or fail to improve, for HTN, OA, chol. I have discussed the diagnosis with the patient and the intended plan as seen in the above orders. Patient is in agreement. The patient has received an after-visit summary and questions were answered concerning future plans. I have discussed medication side effects and warnings with the patient as well.

## 2022-11-04 NOTE — ACP (ADVANCE CARE PLANNING)
Advance Care Planning     Advance Care Planning (ACP) Physician/NP/PA Conversation      Date of Conversation: 11/4/2022  Conducted with: Patient with Decision Making Capacity    Healthcare Decision Maker:   \"Who would you like to name as your primary health care decision-maker? \"            Name: Scooby Gutierrez    Relationship: Ned       Phone number: [phone] (mobile)  \"Can this person be reached easily? \" YES  \"Who would you like to name as your back-up decision maker? \"   Name: Mayelin Mcwilliams   Relationship: Nipatricia    Phone number: [phone] (mobile)  \"Can this person be reached easily? \" YES      Care Preferences:    Hospitalization: \"If your health worsens and it becomes clear that your chance of recovery is unlikely, what would be your preference regarding hospitalization? \"  The patient would prefer hospitalization. Ventilation: \"If you were unable to breathe on your own and your chance of recovery was unlikely, what would be your preference about the use of a ventilator (breathing machine) if it was available to you? \"   The patient would desire the use of a ventilator. Resuscitation: \"In the event your heart stopped as a result of an underlying serious health condition, would you want attempts to be made to restart your heart, or would you prefer a natural death? \"   Yes, attempt to resuscitate.     Additional topics discussed: treatment goals    Conversation Outcomes / Follow-Up Plan:   ACP in process - information provided, considering goals and options  Reviewed DNR/DNI and patient elects Full Code (Attempt Resuscitation)     Length of Voluntary ACP Conversation in minutes:  <16 minutes (Non-Billable)    Elidia Kuhn MD

## 2022-11-04 NOTE — PROGRESS NOTES
This is the Subsequent Medicare Annual Wellness Exam, performed 12 months or more after the Initial AWV or the last Subsequent AWV    I have reviewed the patient's medical history in detail and updated the computerized patient record. Assessment/Plan   Education and counseling provided:  Are appropriate based on today's review and evaluation    1. Type 1 diabetes mellitus with diabetic polyneuropathy (HCC)  -     AMB POC HEMOGLOBIN A1C  2. Medicare annual wellness visit, subsequent       Depression Risk Factor Screening     3 most recent PHQ Screens 11/4/2022   PHQ Not Done -   Little interest or pleasure in doing things Not at all   Feeling down, depressed, irritable, or hopeless Not at all   Total Score PHQ 2 0   Trouble falling or staying asleep, or sleeping too much -   Feeling tired or having little energy -   Poor appetite, weight loss, or overeating -   Feeling bad about yourself - or that you are a failure or have let yourself or your family down -   Trouble concentrating on things such as school, work, reading, or watching TV -   Moving or speaking so slowly that other people could have noticed; or the opposite being so fidgety that others notice -   Thoughts of being better off dead, or hurting yourself in some way -   PHQ 9 Score -   How difficult have these problems made it for you to do your work, take care of your home and get along with others -       Alcohol & Drug Abuse Risk Screen    Do you average more than 1 drink per night or more than 7 drinks a week:  No    On any one occasion in the past three months have you have had more than 3 drinks containing alcohol:  No          Functional Ability and Level of Safety    Hearing: Hearing is good. Activities of Daily Living: The home contains: no safety equipment. Patient does total self care      Ambulation: with no difficulty     Fall Risk:  Fall Risk Assessment, last 12 mths 11/4/2022   Able to walk? Yes   Fall in past 12 months?  1   Do you feel unsteady? 0   Are you worried about falling 0   Is the gait abnormal? 0   Number of falls in past 12 months 2   Fall with injury? 0      Abuse Screen:  Patient is not abused       Cognitive Screening    Has your family/caregiver stated any concerns about your memory: no     Cognitive Screening: normal on exam    Health Maintenance Due     Health Maintenance Due   Topic Date Due    Eye Exam Retinal or Dilated  01/27/2021    COVID-19 Vaccine (2 - Booster for Kandy series) 07/08/2021    Foot Exam Q1  06/25/2022    Flu Vaccine (1) Never done    MICROALBUMIN Q1  09/10/2022    Lipid Screen  09/10/2022       Patient Care Team   Patient Care Team:  Celeste Fernandez MD as PCP - General (Internal Medicine Physician)  Celeste Fernandez MD as PCP - [facility]  Genia Schilder, MD as Physician (Endocrinology Physician)    History     Patient Active Problem List   Diagnosis Code    Type 1 diabetes mellitus with diabetic polyneuropathy ([address] E10.42    Essential hypertension I10    Mixed hyperlipidemia E78.2    Allergic rhinitis J30.9    Vitamin D deficiency E55.9    Primary osteoarthritis involving multiple joints M15.9    Obesity (BMI 30-39. 9) E66.9    Osteopenia M85.80    Mild episode of recurrent major depressive disorder ([address] F33.0    Repeated falls R29.6     Past Medical History:   Diagnosis Date    Arthritis     Chronic left shoulder pain 5/10/2017    Chronic pain syndrome 9/27/2017    Cross-eye     Depression     Diabetes ([address]     Type 1    Flatulence     Hypercholesterolemia     Hypertension     Menopause     LMP-63years old? Sinus congestion     Traumatic complete tear of right rotator cuff 9/24/2018 9/20/18, Dr. Solomon Leija: probable R torn rotator cuff. MRI ordered. MRI 11/14/18: complete tear of supraspinatus tendon. 2/28/19, Dr. Marcia Morrissey, right shoulder arthroscopy, rotator cuff repair. 7/1/19, Dr. Jasiel Turk: repair is intact.  Has glenohumeral OA at right shoulder causing stiffness and soreness, given cortisone shot      Past Surgical History:   Procedure Laterality Date    HX ORTHOPAEDIC  02/28/2019    Dr. Randi Chang. Shoulder arthoscopy, rotator cuff repair x 2, subacromial decompression, distal claviculolectomy    HX TUBAL LIGATION  [id number]    [facility]T SGMT PROC UNLISTED       Current Outpatient Medications   Medication Sig Dispense Refill    Accu-Chek Softclix Lancets misc USE TO TEST BLOOD SUGAR 3 TIMES PER DAY FOR DX: E10.9 100 Lancet 11    insulin NPH/insulin regular (HumuLIN 70/30 U-100 Insulin) 100 unit/mL (70-30) injection INJECT SUBCUTANEOUSLY 37 UNITS BEFORE BREAKFAST AND 16 UNITS BEFORE DINNER. DX: E10.9. (Patient taking differently: INJECT SUBCUTANEOUSLY 38 UNITS BEFORE BREAKFAST AND 19 UNITS BEFORE DINNER. DX: E10.9.) 50 mL 2    diclofenac EC (VOLTAREN) 50 mg EC tablet TAKE 1 TABLET BY MOUTH TWO (2) TIMES DAILY AS NEEDED FOR PAIN. 60 Tablet 3    Insulin Syringe-Needle U-100 (BD Insulin Syringe Ultra-Fine) 0.5 mL 30 gauge x 1/2\" syrg USE TO INJECT INSULIN TWICE DAILY. DX: E10.9 100 Each 6    buPROPion SR (WELLBUTRIN SR) 150 mg SR tablet TAKE 1 TABLET BY MOUTH TWO TIMES A DAY. 180 Tablet 3    rosuvastatin (CRESTOR) 20 mg tablet TAKE 1 TABLET BY MOUTH EVERYDAY AT BEDTIME 90 Tablet 3    irbesartan (AVAPRO) 150 mg tablet TAKE 1 TABLET BY MOUTH EVERY DAY 90 Tablet 3    Accu-Chek Jaja Plus test strp strip USE TO TEST BLOOD SUGARS 3 TIMES A DAY. DX: E10.9 300 Strip 3    gabapentin (NEURONTIN) 300 mg capsule Take 300 mg by mouth three (3) times daily. Prescribed by Dr. Memo Taylor. 60 Capsule 0    aspirin delayed-release 81 mg tablet Take 1 Tab by mouth daily. 90 Tab 3    cholecalciferol, vitamin D3, 50 mcg (2,000 unit) tab Take 2,000 Units by mouth.        Allergies   Allergen Reactions    Compazine [Prochlorperazine Edisylate] Other (comments)     Tongue swelling, drooling    Naprosyn [Naproxen] Other (comments)     Belching,gas,stomach pain    Percocet [Oxycodone-Acetaminophen] Other (comments)     Visual problems/seeings in triplicate per patient       Family History   Problem Relation Age of Onset    Cancer Mother     Diabetes Sister     Heart Disease Sister     Diabetes Brother     Heart Disease Brother     Diabetes Maternal Grandmother     Diabetes Paternal Grandmother      Social History     Tobacco Use    Smoking status: Never    Smokeless tobacco: Never   Substance Use Topics    Alcohol use: No         Luna Carias MD

## 2022-11-07 ENCOUNTER — TELEPHONE (OUTPATIENT)
Dept: INTERNAL MEDICINE CLINIC | Age: 72
End: 2022-11-07

## 2022-11-07 DIAGNOSIS — E66.9 OBESITY (BMI 30-39.9): ICD-10-CM

## 2022-11-07 DIAGNOSIS — M79.672 BILATERAL FOOT PAIN: Primary | ICD-10-CM

## 2022-11-07 DIAGNOSIS — M79.671 BILATERAL FOOT PAIN: Primary | ICD-10-CM

## 2022-11-07 RX ORDER — PHENTERMINE HYDROCHLORIDE 15 MG/1
15 CAPSULE ORAL
Qty: 30 CAPSULE | Refills: 1 | Status: SHIPPED | OUTPATIENT
Start: 2022-11-07

## 2022-11-07 NOTE — TELEPHONE ENCOUNTER
Tell her that Dr. Mignon Mathias just sent a medication for weight loss to her pharmacy. For the Elocon, she should start by using it every other day as needed for 1 week. If she has no problems with it, then continue. If there are problems, then stop it.

## 2022-11-07 NOTE — TELEPHONE ENCOUNTER
Called to pt. Verified pt name and date of birth. Pt stated concern that after reading the information pack in the elocon prescription that it may have side effects that could harm her   Notified pt that all medications may have some kind of side effect. Dr. Felicity Rosas and the pharmacist would not have prescribed the medication for her if it was to cause her harm. Pt stated understanding. Pt then stated that at her last appointment with Dr. Felicity Rosas, she had requested something to help her lose weight. Pt states she did not get a prescription for that.

## 2022-11-08 NOTE — TELEPHONE ENCOUNTER
Returned call to pt. Verified pt name and date of birth. Pt indicated that her insurance does not cover the medication Dr Kate Bautista sent to the pharmacy. I notified pt that there is coupon available and she can get it for 7$.  Pt stated that was too much money for her. Notified pt per my past experience that medicare will not pay for weight loss drugs. Pt stated understanding. Notified pt about the directions of use for the elecon cream.  Pt stated understanding. Pt then stated at last visit Dr. Kate Bautista had mentioned she was to have feet xray?

## 2022-11-08 NOTE — TELEPHONE ENCOUNTER
I do not recall saying this but will place an order for X-ray of feet. Order can be mailed to her. Have X-ray done at [facility]. She does not need an appointment; is walk-in.

## 2022-11-09 LAB
25(OH)D3+25(OH)D2 SERPL-MCNC: 50.2 NG/ML (ref 30–100)
ALBUMIN/CREAT UR: 15 MG/G CREAT (ref 0–29)
CCP IGA+IGG SERPL IA-ACNC: 3 UNITS (ref 0–19)
CENTROMERE B AB SER-ACNC: <0.2 AI (ref 0–0.9)
CHOLEST SERPL-MCNC: 141 MG/DL (ref 100–199)
CHROMATIN AB SERPL-ACNC: <0.2 AI (ref 0–0.9)
CREAT UR-MCNC: 124 MG/DL
DSDNA AB SER-ACNC: 1 IU/ML (ref 0–9)
ENA JO1 AB SER-ACNC: <0.2 AI (ref 0–0.9)
ENA RNP AB SER-ACNC: <0.2 AI (ref 0–0.9)
ENA SCL70 AB SER-ACNC: <0.2 AI (ref 0–0.9)
ENA SM AB SER-ACNC: <0.2 AI (ref 0–0.9)
ENA SS-A AB SER-ACNC: <0.2 AI (ref 0–0.9)
ENA SS-B AB SER-ACNC: <0.2 AI (ref 0–0.9)
ERYTHROCYTE [SEDIMENTATION RATE] IN BLOOD BY WESTERGREN METHOD: 2 MM/HR (ref 0–40)
HDLC SERPL-MCNC: 64 MG/DL
LDLC SERPL CALC-MCNC: 60 MG/DL (ref 0–99)
MICROALBUMIN UR-MCNC: 18.1 UG/ML
RHEUMATOID FACT SERPL-ACNC: <10 IU/ML (ref 0–15)
SEE BELOW, 164869: NORMAL
TRIGL SERPL-MCNC: 89 MG/DL (ref 0–149)
VLDLC SERPL CALC-MCNC: 17 MG/DL (ref 5–40)

## 2022-11-09 NOTE — TELEPHONE ENCOUNTER
Returned call to pt. Verified name and date of birth. Notified pt that the xray order has been written. Pt requested to have them mailed to her.  I will comply

## 2022-11-12 NOTE — PROGRESS NOTES
Letter sent: Your urine microalbumin, or protein, test returned normal. This means there is no early sign of diabetes affecting your kidneys.

## 2022-11-29 DIAGNOSIS — E11.9 TYPE 2 DIABETES MELLITUS WITHOUT COMPLICATION, WITH LONG-TERM CURRENT USE OF INSULIN (HCC): ICD-10-CM

## 2022-11-29 DIAGNOSIS — Z79.4 TYPE 2 DIABETES MELLITUS WITHOUT COMPLICATION, WITH LONG-TERM CURRENT USE OF INSULIN (HCC): ICD-10-CM

## 2022-11-29 DIAGNOSIS — L29.9 PRURITUS OF SCALP: ICD-10-CM

## 2022-11-29 DIAGNOSIS — M15.9 PRIMARY OSTEOARTHRITIS INVOLVING MULTIPLE JOINTS: ICD-10-CM

## 2022-11-29 DIAGNOSIS — M25.551 RIGHT HIP PAIN: ICD-10-CM

## 2022-11-29 RX ORDER — BLOOD SUGAR DIAGNOSTIC
STRIP MISCELLANEOUS
Qty: 300 STRIP | Refills: 3 | Status: SHIPPED | OUTPATIENT
Start: 2022-11-29

## 2022-11-29 RX ORDER — MOMETASONE FUROATE 1 MG/ML
SOLUTION TOPICAL
Qty: 30 ML | Refills: 0 | Status: SHIPPED | OUTPATIENT
Start: 2022-11-29 | End: 2022-12-04

## 2022-11-29 RX ORDER — DICLOFENAC SODIUM 50 MG/1
50 TABLET, DELAYED RELEASE ORAL
Qty: 60 TABLET | Refills: 3 | Status: SHIPPED | OUTPATIENT
Start: 2022-11-29

## 2022-12-04 DIAGNOSIS — L29.9 PRURITUS OF SCALP: ICD-10-CM

## 2022-12-04 RX ORDER — MOMETASONE FUROATE 1 MG/ML
SOLUTION TOPICAL
Qty: 30 ML | Refills: 0 | Status: SHIPPED | OUTPATIENT
Start: 2022-12-04

## 2022-12-19 ENCOUNTER — TELEPHONE (OUTPATIENT)
Dept: INTERNAL MEDICINE CLINIC | Age: 72
End: 2022-12-19

## 2022-12-19 NOTE — TELEPHONE ENCOUNTER
----- Message from Zelda Mora sent at 12/19/2022  1:10 PM EST -----  Subject: Message to Provider    QUESTIONS  Information for Provider? Pt needs to reschedule her missed appt with   Krish Bee, pls give her a call.  ---------------------------------------------------------------------------  --------------  [facility] EGNO  [phone]; OK to leave message on voicemail  ---------------------------------------------------------------------------  --------------  SCRIPT ANSWERS  Relationship to Patient?  Self

## 2023-01-12 DIAGNOSIS — L29.9 PRURITUS OF SCALP: ICD-10-CM

## 2023-01-12 RX ORDER — MOMETASONE FUROATE 1 MG/ML
SOLUTION TOPICAL
Qty: 30 ML | Refills: 0 | Status: SHIPPED | OUTPATIENT
Start: 2023-01-12

## 2023-02-16 DIAGNOSIS — E10.65 TYPE 1 DIABETES MELLITUS WITH HYPERGLYCEMIA (HCC): Primary | ICD-10-CM

## 2023-02-20 ENCOUNTER — CLINICAL SUPPORT (OUTPATIENT)
Dept: DIABETES SERVICES | Age: 73
End: 2023-02-20
Payer: MEDICARE

## 2023-02-20 DIAGNOSIS — E10.65 TYPE 1 DIABETES MELLITUS WITH HYPERGLYCEMIA (HCC): Primary | ICD-10-CM

## 2023-02-20 PROCEDURE — G0108 DIAB MANAGE TRN  PER INDIV: HCPCS

## 2023-02-20 NOTE — PROGRESS NOTES
[facility] Program for Diabetes Health  Diabetes Self-Management Education & Support Program  Encounter Note    SUMMARY  Diabetes self-care management training was completed related to healthy eating and taking medications. The participant will return on TBD to continue DSMES related to TBD. The participant did not identify SMART Goal(s) and will practice knowledge and skills related to healthy eating and monitoring and medications to improve diabetes self-management. EVALUATION:  Ms. Rimma Devine continues to do a great job taking and logging her BGs 3x/day before meals, however she is sometimes <70 three times a day and over treating hypoglycemia often enough that she also has >250-300 BGs 1-2x/day. We reviewed the 15-15 Rule, the sources of proteins, carbs, and non-starchy vegetables, and Healthy Plate, she had little to no recall of this information from our previous visits. I emphasized the importance of adequate carbs and proteins at each meal in relationship to her insulin and treating hypoglycemia appropriately to better control BGs, she expressed understanding, but I am not confident she will apply this information. She remains opposed to changing insulin type, she is taking NPH 38 units before breakfast and 19 units before dinner. She has 1 remaining Medicare covered hour of DSMES for 2023, we will likely catch up in about 6 months. RECOMMENDATIONS:  Use Healthy Plate at each meal, use the 15-15 Rule to treat hypoglycemia. TOPICS DISCUSSED TODAY:  HOW DO I STAY HEALTHY? [address]? 30      Next provider visit is scheduled for TBD       DATE DSMES TOPIC EVALUATION     2/20/2023 WHAT CAN I EAT?    General principles   Determining a healthy weight   Nutritional terms & tools   Healthy Plate method   Carbohydrate Counting   Reading food labels   Free apps   Pregnancy recommendations      The participant   Uses Healthy Plate principles in constructing meals: No  Reads food labels in choosing acceptable foods: No    The participant needs to address -see notes above. DATE DSMES TOPIC EVALUATION     2/20/2023 HOW DO MY DIABETES MEDICATIONS WORK? Type 1 medications & methods   Insulin injections   Injection sites   Type 2 medications   Oral agents   GLP-1 agonists   Hypoglycemia symptoms & treatment   Glucagon emergency kits   General guidance regarding insulin use whether Type 1, 2 or gestational diabetes   Storage of insulin   Disposal    Traveling with medications   Barriers to medication adherence      The participant   Can describe the expected action & side effects of prescribed diabetes medications: No,despite covering this information several times, Ms. Zachary Heart does not grasp the relationship between the onset/action/duration of NPH to what/when she eats. Can demonstrate injection technique (if applicable): Yes    The participant needs to address -see notes above, changing insulin type is not likely to be acceptable to her despite the problems with NPH. Nolberto Toledo RN on 2/20/2023 at 2:07 PM    I have personally reviewed the health record, including provider notes, laboratory data and current medications before making these care and education recommendations. The time spent in this effort is included in the total time.   Total minutes: 60

## 2023-03-07 ENCOUNTER — OFFICE VISIT (OUTPATIENT)
Dept: ENDOCRINOLOGY | Age: 73
End: 2023-03-07
Payer: MEDICARE

## 2023-03-07 VITALS
DIASTOLIC BLOOD PRESSURE: 70 MMHG | HEIGHT: 58 IN | BODY MASS INDEX: 28.76 KG/M2 | SYSTOLIC BLOOD PRESSURE: 136 MMHG | WEIGHT: 137 LBS | HEART RATE: 73 BPM

## 2023-03-07 DIAGNOSIS — E10.65 TYPE 1 DIABETES MELLITUS WITH HYPERGLYCEMIA (HCC): Primary | ICD-10-CM

## 2023-03-07 DIAGNOSIS — E10.42 TYPE 1 DIABETES MELLITUS WITH DIABETIC POLYNEUROPATHY (HCC): Primary | ICD-10-CM

## 2023-03-07 PROCEDURE — G8399 PT W/DXA RESULTS DOCUMENT: HCPCS | Performed by: INTERNAL MEDICINE

## 2023-03-07 PROCEDURE — 3075F SYST BP GE 130 - 139MM HG: CPT | Performed by: INTERNAL MEDICINE

## 2023-03-07 PROCEDURE — 1123F ACP DISCUSS/DSCN MKR DOCD: CPT | Performed by: INTERNAL MEDICINE

## 2023-03-07 PROCEDURE — 99214 OFFICE O/P EST MOD 30 MIN: CPT | Performed by: INTERNAL MEDICINE

## 2023-03-07 PROCEDURE — 2022F DILAT RTA XM EVC RTNOPTHY: CPT | Performed by: INTERNAL MEDICINE

## 2023-03-07 PROCEDURE — G8536 NO DOC ELDER MAL SCRN: HCPCS | Performed by: INTERNAL MEDICINE

## 2023-03-07 PROCEDURE — G8427 DOCREV CUR MEDS BY ELIG CLIN: HCPCS | Performed by: INTERNAL MEDICINE

## 2023-03-07 PROCEDURE — 1090F PRES/ABSN URINE INCON ASSESS: CPT | Performed by: INTERNAL MEDICINE

## 2023-03-07 PROCEDURE — 1101F PT FALLS ASSESS-DOCD LE1/YR: CPT | Performed by: INTERNAL MEDICINE

## 2023-03-07 PROCEDURE — G8417 CALC BMI ABV UP PARAM F/U: HCPCS | Performed by: INTERNAL MEDICINE

## 2023-03-07 PROCEDURE — G9899 SCRN MAM PERF RSLTS DOC: HCPCS | Performed by: INTERNAL MEDICINE

## 2023-03-07 PROCEDURE — 3017F COLORECTAL CA SCREEN DOC REV: CPT | Performed by: INTERNAL MEDICINE

## 2023-03-07 PROCEDURE — 3078F DIAST BP <80 MM HG: CPT | Performed by: INTERNAL MEDICINE

## 2023-03-07 PROCEDURE — G9717 DOC PT DX DEP/BP F/U NT REQ: HCPCS | Performed by: INTERNAL MEDICINE

## 2023-03-07 PROCEDURE — 3046F HEMOGLOBIN A1C LEVEL >9.0%: CPT | Performed by: INTERNAL MEDICINE

## 2023-03-07 RX ORDER — NAPROXEN SODIUM 220 MG
TABLET ORAL
Qty: 100 EACH | Refills: 5 | Status: SHIPPED | OUTPATIENT
Start: 2023-03-07

## 2023-03-07 RX ORDER — PEN NEEDLE, DIABETIC 29 G X1/2"
NEEDLE, DISPOSABLE MISCELLANEOUS
COMMUNITY
Start: 2023-01-08

## 2023-03-07 NOTE — LETTER
3/7/2023    Patient: Kimberli Hills   YOB: 1950   Date of Visit: 3/7/2023     Angelina Candelaria MD  [address] [address]  [place] [facility] [address]    Dear Angelina Candelaria MD,      Thank you for referring Ms. Kimberli Hills to [address] [place] [address] DIABETES AND ENDOCRINOLOGY-[facility] for evaluation. My notes for this consultation are attached. If you have questions, please do not hesitate to call me. I look forward to following your patient along with you.       Sincerely,    Kary Stuart MD

## 2023-03-07 NOTE — PATIENT INSTRUCTIONS
Small snack between breakfast and lunch to avoid lows at lunch    Try to keep dinner as close to same time each day as possible    Labs today

## 2023-03-07 NOTE — TELEPHONE ENCOUNTER
Pt requests new rx, pt want to change size on insulin syringes, but we will need new rx. Please send new rx so we can switch the size of their syringes.

## 2023-03-07 NOTE — TELEPHONE ENCOUNTER
Called to pharmacy for clarification. Verified pt name and date of birth. Person in pharmacy stated pt would like insulin syringes . 5ml, 8mm -31G

## 2023-03-07 NOTE — PROGRESS NOTES
Chief Complaint   Patient presents with    Diabetes         Patient was last seen: 4 months ago -5/20/2022       General:   Nothing new    Has chronic pain all over     c-peptide is low    Worked with [employer]     Depression and stress affect sugars       A1c: last a1c was 8.0    DM Medications:   Novolin 70/30 insulin: 38 units breakfast, 19 units with dinner (rotates injection sites) - gives right before eating    Last Changes: no changes at last visit    Sugar Checks: checks up to 2 x day -libre2 not covered     AM: reports:          PM: see above     LOWs:  has low sugars    DIET: eats what they want, not much education - is part of [employer] [facility] now    EXERCISE: exercise limited due to pain     HTN: at goal, on ARB     LIPIDS: at goal, on statin, lipids followed by PCP    RENAL: has normal renal function, has normal FELTON-r , in the past year, is on an ARB     EYES: eye exam in past year, has no retinopathy     FEET: sees podiatry, has numbness, has tingling at times, has some pain, has hammer toes and bunions     DENTAL:  has no teeth     HEART:  no chest pain, shortness of breath or claudication, has no cardiac history     ASA:  is on aspirin     SYMPTOMS: no polyuria, thirst or blurred vision     THYROID: no known thyroid issue    DIABETES HISTORY:   From 1st visit 12/16/2021  Type 1 Diabetic diagnosed 1976 near when had oldest child  On insulin since then; though at one point told didn't need  Can also not take 70/30 insulin in AM and not be nathaly high by next dose which is odd  No other insulins except mixed insulin  Currently is free    LABS/STUDIES:  Lab Results   Component Value Date/Time    Hemoglobin A1c 8.0 (H) 11/08/2022 09:57 AM    Hemoglobin A1c 8.8 (H) 03/15/2022 01:16 PM    Hemoglobin A1c 8.5 (H) 09/10/2021 02:35 PM    Glucose 135 (H) 11/08/2022 09:57 AM    Glucose  05/23/2018 10:07 AM    GFR est AA 70 09/10/2021 02:35 PM    GFR est non-AA 61 09/10/2021 02:35 PM    Microalbumin/Creat ratio (mg/g creat) 9 02/25/2015 06:30 PM    Microalb/Creat ratio (ug/mg creat.) 15 11/08/2022 09:53 AM    Microalbumin,urine random 0.95 02/25/2015 06:30 PM    Creatinine 0.99 11/08/2022 09:57 AM        Lab Results   Component Value Date/Time    Cholesterol, total 141 11/08/2022 09:53 AM    Triglyceride 89 11/08/2022 09:53 AM    HDL Cholesterol 64 11/08/2022 09:53 AM    LDL, calculated 60 11/08/2022 09:53 AM    LDL, calculated 73 01/14/2020 04:15 PM     Lab Results   Component Value Date/Time    C-Peptide 0.5 (L) 03/15/2022 01:16 PM         Past Medical History:   Diagnosis Date    Arthritis     Chronic left shoulder pain 5/10/2017    Chronic pain syndrome 9/27/2017    Cross-eye     Depression     Diabetes ([facility] [address]     Type 1    Flatulence     Hypercholesterolemia     Hypertension     Menopause     LMP-63years old? Sinus congestion     Traumatic complete tear of right rotator cuff 9/24/2018 9/20/18, Dr. Vikki Verma: probable R torn rotator cuff. MRI ordered. MRI 11/14/18: complete tear of supraspinatus tendon. 2/28/19, Dr. Roshan Vasquez, right shoulder arthroscopy, rotator cuff repair. 7/1/19, Dr. Preston Moscoso: repair is intact. Has glenohumeral OA at right shoulder causing stiffness and soreness, given cortisone shot        Blood pressure 136/70, pulse 73, height 4' 10\" (1.473 m), weight 137 lb (62.1 kg). Weight Metrics 3/7/2023 11/4/2022 9/23/2022 5/20/2022 3/29/2022 3/15/2022 12/16/2021   Weight 137 lb 140 lb 9.6 oz 139 lb 139 lb 139 lb 8 oz 140 lb 142 lb 3.2 oz   BMI 28.63 kg/m2 29.39 kg/m2 29.05 kg/m2 29.05 kg/m2 29.16 kg/m2 29.26 kg/m2 29.72 kg/m2        EXAM  -not done today    Assessment/Plan:   1.  Type 1 diabetes mellitus with hyperglycemia (HCC)  Still unwilling to switch insulins to MDI (N&R) so very limited to help her  Prior cgm report showed all the typical problems associated with mixed insulin  Also now not always getting insulin at same time so delay causing higher sugars in evening at times  Needs to be consistent with meal timing, dosing and carb content  Have small snack btw Bk and Ln to avoid lows at lunch (can't lower AM 70/30 as already a bit high most of the time pre-dinner)  All HS sugars higher end so hard to lower PM 70/30 to avoid lows in AM!    2. Primary hypertension  On meds per PCP    3. Mixed hyperlipidemia  At goal on statin per PCP    Orders Placed This Encounter    HEMOGLOBIN A1C WITH EAG    METABOLIC PANEL, BASIC    MICROALBUMIN, UR, RAND W/ MICROALB/CREAT RATIO          Follow-up and Dispositions    Return in about 4 months (around 7/7/2023).

## 2023-03-08 DIAGNOSIS — E10.42 TYPE 1 DIABETES MELLITUS WITH DIABETIC POLYNEUROPATHY (HCC): ICD-10-CM

## 2023-04-21 DIAGNOSIS — Z12.31 SCREENING MAMMOGRAM FOR HIGH-RISK PATIENT: Primary | ICD-10-CM

## 2023-05-10 ENCOUNTER — OFFICE VISIT (OUTPATIENT)
Facility: CLINIC | Age: 73
End: 2023-05-10
Payer: MEDICARE

## 2023-05-10 VITALS
OXYGEN SATURATION: 95 % | TEMPERATURE: 98.4 F | WEIGHT: 138.4 LBS | RESPIRATION RATE: 20 BRPM | SYSTOLIC BLOOD PRESSURE: 139 MMHG | HEART RATE: 75 BPM | DIASTOLIC BLOOD PRESSURE: 82 MMHG | HEIGHT: 58 IN | BODY MASS INDEX: 29.05 KG/M2

## 2023-05-10 DIAGNOSIS — F33.0 MAJOR DEPRESSIVE DISORDER, RECURRENT, MILD (HCC): ICD-10-CM

## 2023-05-10 DIAGNOSIS — I10 ESSENTIAL HYPERTENSION: ICD-10-CM

## 2023-05-10 DIAGNOSIS — E55.9 VITAMIN D DEFICIENCY: ICD-10-CM

## 2023-05-10 DIAGNOSIS — L29.9 SCALP PRURITUS: ICD-10-CM

## 2023-05-10 DIAGNOSIS — M15.9 PRIMARY OSTEOARTHRITIS INVOLVING MULTIPLE JOINTS: ICD-10-CM

## 2023-05-10 DIAGNOSIS — E78.2 MIXED HYPERLIPIDEMIA: ICD-10-CM

## 2023-05-10 DIAGNOSIS — E10.42 TYPE 1 DIABETES MELLITUS WITH DIABETIC POLYNEUROPATHY (HCC): Primary | ICD-10-CM

## 2023-05-10 LAB — HBA1C MFR BLD: 7.5 %

## 2023-05-10 PROCEDURE — 3075F SYST BP GE 130 - 139MM HG: CPT | Performed by: INTERNAL MEDICINE

## 2023-05-10 PROCEDURE — 83036 HEMOGLOBIN GLYCOSYLATED A1C: CPT | Performed by: INTERNAL MEDICINE

## 2023-05-10 PROCEDURE — 3079F DIAST BP 80-89 MM HG: CPT | Performed by: INTERNAL MEDICINE

## 2023-05-10 PROCEDURE — 99214 OFFICE O/P EST MOD 30 MIN: CPT | Performed by: INTERNAL MEDICINE

## 2023-05-10 PROCEDURE — 1123F ACP DISCUSS/DSCN MKR DOCD: CPT | Performed by: INTERNAL MEDICINE

## 2023-05-10 RX ORDER — ROSUVASTATIN CALCIUM 20 MG/1
20 TABLET, COATED ORAL NIGHTLY
COMMUNITY

## 2023-05-10 RX ORDER — ACETAMINOPHEN 160 MG
TABLET,DISINTEGRATING ORAL DAILY
COMMUNITY

## 2023-05-10 RX ORDER — MOMETASONE FUROATE 1 MG/ML
SOLUTION TOPICAL DAILY
COMMUNITY

## 2023-05-10 RX ORDER — ASPIRIN 81 MG/1
81 TABLET ORAL DAILY
COMMUNITY

## 2023-05-10 RX ORDER — IRBESARTAN 150 MG/1
150 TABLET ORAL NIGHTLY
COMMUNITY

## 2023-05-10 RX ORDER — GABAPENTIN 300 MG/1
300 CAPSULE ORAL 3 TIMES DAILY
COMMUNITY

## 2023-05-10 RX ORDER — BUPROPION HYDROCHLORIDE 150 MG/1
150 TABLET, EXTENDED RELEASE ORAL 2 TIMES DAILY
COMMUNITY
End: 2023-05-10 | Stop reason: DRUGHIGH

## 2023-05-10 RX ORDER — LANCETS
EACH MISCELLANEOUS
COMMUNITY
Start: 2022-09-01

## 2023-05-10 RX ORDER — BUPROPION HYDROCHLORIDE 150 MG/1
150 TABLET, EXTENDED RELEASE ORAL EVERY MORNING
Qty: 60 TABLET | Refills: 0 | Status: SHIPPED
Start: 2023-05-10

## 2023-05-10 SDOH — ECONOMIC STABILITY: HOUSING INSECURITY
IN THE LAST 12 MONTHS, WAS THERE A TIME WHEN YOU DID NOT HAVE A STEADY PLACE TO SLEEP OR SLEPT IN A SHELTER (INCLUDING NOW)?: NO

## 2023-05-10 SDOH — ECONOMIC STABILITY: FOOD INSECURITY: WITHIN THE PAST 12 MONTHS, YOU WORRIED THAT YOUR FOOD WOULD RUN OUT BEFORE YOU GOT MONEY TO BUY MORE.: NEVER TRUE

## 2023-05-10 ASSESSMENT — PATIENT HEALTH QUESTIONNAIRE - PHQ9
2. FEELING DOWN, DEPRESSED OR HOPELESS: 2
SUM OF ALL RESPONSES TO PHQ QUESTIONS 1-9: 2
1. LITTLE INTEREST OR PLEASURE IN DOING THINGS: 0
SUM OF ALL RESPONSES TO PHQ QUESTIONS 1-9: 2
SUM OF ALL RESPONSES TO PHQ QUESTIONS 1-9: 2
SUM OF ALL RESPONSES TO PHQ9 QUESTIONS 1 & 2: 2
SUM OF ALL RESPONSES TO PHQ QUESTIONS 1-9: 2

## 2023-05-10 NOTE — PROGRESS NOTES
Elise Don  Identified pt with two pt identifiers(name and ). Chief Complaint   Patient presents with    Hypertension    Diabetes       Reviewed record In preparation for visit and have obtained necessary documentation. 1. Have you been to the ER, urgent care clinic or hospitalized since your last visit? No     2. Have you seen or consulted any other health care providers outside of the [address] [place] [address] since your last visit? Include any pap smears or colon screening. No    Vitals reviewed with provider. Health Maintenance reviewed:     Health Maintenance Due   Topic    Shingles vaccine (1 of 2)    DTaP/Tdap/Td vaccine (1 - Tdap)    Diabetic retinal exam     COVID-19 Vaccine (2 - Booster for Jo-Ann series)    A1C test (Diabetic or Prediabetic)     Breast cancer screen           Wt Readings from Last 3 Encounters:   05/10/23 138 lb 6.4 oz (62.8 kg)   23 137 lb (62.1 kg)   22 140 lb 9.6 oz (63.8 kg)        Temp Readings from Last 3 Encounters:   05/10/23 98.4 °F (36.9 °C) (Oral)        BP Readings from Last 3 Encounters:   05/10/23 139/82   23 136/70   22 121/78        Pulse Readings from Last 3 Encounters:   05/10/23 75   23 73   22 68      No flowsheet data found. Learning Assessment:   :   No flowsheet data found. Abuse Assessment:    No flowsheet data found. Fall Risk Assessment:   :     ,  Amb Fall Risk Assessment and TUG Test 2022   Fall in past 12 months? 1 0 1   Able to walk? Yes Yes Yes          ADL Assessment:    No flowsheet data found.

## 2023-05-10 NOTE — PROGRESS NOTES
Chief Complaint   Patient presents with    Hypertension    Diabetes       HISTORY OF PRESENT ILLNESS  Murphy Waldron is a 71 y.o. female    Presents for 6 month follow up evaluation. She has type 1 DM, HTN, hyperlipidemia, OA at multiple joints, and seasonal allergic rhinitis. Complains of pain at left shoulder, knees, lower back, and right hip. Difficulty lifting left shoulder. Still with itching at scalp. Says mometasone 0.1% lotion has not helped much. Denies HA's, CP, SOB, dizziness, heart palpitations, or leg swelling. Home BP monitoring: not done. Follows with Dr. Marci Sanchez for type 1 DM. Denies polydipsia, polyuria, or paresthesias at feet. Has occasional hypoglycemia in the 60's in the afternoons. A1c 7.5% today (5/10/23), was 8.0% on 11/8/22, 7.8% on 11/4/22, and 8.8% on 3/15/22. Eye exam: 4/2023, Dr. Narcisa Arteaga, [facility]. Was told she has cataracts. Also diagnosed with another eye condition (cannot remember what) and [facility]. Patient Active Problem List   Diagnosis    Osteopenia    Allergic rhinitis    Essential hypertension    Vitamin D deficiency    Primary osteoarthritis involving multiple joints    Mild episode of recurrent major depressive disorder (HCC)    Obesity (BMI 30-39. 9)    Mixed hyperlipidemia    Type 1 diabetes mellitus with diabetic polyneuropathy (HCC)    Repeated falls     Past Medical History:   Diagnosis Date    Arthritis     Chronic left shoulder pain 5/10/2017    Chronic pain syndrome 9/27/2017    Cross-eye     Depression     Diabetes ([address]     Type 1    Flatulence     Hypercholesterolemia     Hypertension     Menopause     LMP-63years old? Sinus congestion     Traumatic complete tear of right rotator cuff 9/24/2018 9/20/18, Dr. Romel Hutchinson: probable R torn rotator cuff. MRI ordered. MRI 11/14/18: complete tear of supraspinatus tendon. 2/28/19, Dr. Roseann Jones, right shoulder arthroscopy, rotator cuff repair.  7/1/19, Dr. Palumbo All: repair is

## 2023-05-10 NOTE — PATIENT INSTRUCTIONS
Patient Instructions  Call your insurance company to get the name of some orthopedic doctors you can see for arthritis at the shoulder and knees. Schedule appointment with dermatologist.  Have fasting labs done 1 week before your next appointment with me.

## 2023-05-22 RX ORDER — BUPROPION HYDROCHLORIDE 150 MG/1
1 TABLET, EXTENDED RELEASE ORAL 2 TIMES DAILY
COMMUNITY
Start: 2023-02-12 | End: 2023-07-10 | Stop reason: CLARIF

## 2023-05-22 RX ORDER — ASPIRIN 81 MG/1
81 TABLET ORAL DAILY
COMMUNITY
Start: 2019-07-31 | End: 2023-07-10 | Stop reason: CLARIF

## 2023-05-22 RX ORDER — ROSUVASTATIN CALCIUM 20 MG/1
1 TABLET, COATED ORAL NIGHTLY
COMMUNITY
Start: 2023-04-12 | End: 2023-07-10 | Stop reason: CLARIF

## 2023-05-22 RX ORDER — GABAPENTIN 300 MG/1
300 CAPSULE ORAL 3 TIMES DAILY
COMMUNITY
Start: 2021-08-11 | End: 2023-05-23 | Stop reason: SDUPTHER

## 2023-05-22 RX ORDER — IRBESARTAN 150 MG/1
1 TABLET ORAL DAILY
COMMUNITY
Start: 2023-02-12 | End: 2023-07-10 | Stop reason: CLARIF

## 2023-05-22 RX ORDER — MOMETASONE FUROATE 1 MG/ML
SOLUTION TOPICAL
COMMUNITY
Start: 2023-04-11

## 2023-05-23 DIAGNOSIS — E10.42 TYPE 1 DIABETES MELLITUS WITH DIABETIC POLYNEUROPATHY (HCC): Primary | ICD-10-CM

## 2023-05-23 RX ORDER — GABAPENTIN 300 MG/1
300 CAPSULE ORAL 3 TIMES DAILY
Qty: 90 CAPSULE | Refills: 2 | Status: SHIPPED | OUTPATIENT
Start: 2023-05-23 | End: 2023-08-21

## 2023-05-24 ENCOUNTER — TRANSCRIBE ORDERS (OUTPATIENT)
Facility: HOSPITAL | Age: 73
End: 2023-05-24

## 2023-05-24 DIAGNOSIS — Z12.31 VISIT FOR SCREENING MAMMOGRAM: Primary | ICD-10-CM

## 2023-07-10 ENCOUNTER — OFFICE VISIT (OUTPATIENT)
Age: 73
End: 2023-07-10
Payer: MEDICARE

## 2023-07-10 VITALS
WEIGHT: 137 LBS | DIASTOLIC BLOOD PRESSURE: 78 MMHG | BODY MASS INDEX: 28.76 KG/M2 | HEART RATE: 75 BPM | SYSTOLIC BLOOD PRESSURE: 134 MMHG | HEIGHT: 58 IN

## 2023-07-10 DIAGNOSIS — E10.65 TYPE 1 DIABETES MELLITUS WITH HYPERGLYCEMIA (HCC): Primary | ICD-10-CM

## 2023-07-10 PROCEDURE — 3078F DIAST BP <80 MM HG: CPT | Performed by: INTERNAL MEDICINE

## 2023-07-10 PROCEDURE — 3051F HG A1C>EQUAL 7.0%<8.0%: CPT | Performed by: INTERNAL MEDICINE

## 2023-07-10 PROCEDURE — 3075F SYST BP GE 130 - 139MM HG: CPT | Performed by: INTERNAL MEDICINE

## 2023-07-10 PROCEDURE — 99214 OFFICE O/P EST MOD 30 MIN: CPT | Performed by: INTERNAL MEDICINE

## 2023-07-10 PROCEDURE — 1123F ACP DISCUSS/DSCN MKR DOCD: CPT | Performed by: INTERNAL MEDICINE

## 2023-07-10 RX ORDER — PEN NEEDLE, DIABETIC 29 G X1/2"
NEEDLE, DISPOSABLE MISCELLANEOUS
COMMUNITY
Start: 2023-06-18

## 2023-07-10 NOTE — PROGRESS NOTES
Chief Complaint   Patient presents with    Diabetes       Patient was last seen: 4 months ago 3/7/2023     General:   Had a significant low  So ordered CGM b/c now qualifies  But never got it    Of note - got in past and couldn not get it to stay on (freestyle libre2) -had son help her with it  Gave her a handout of adhesion for this time and told her Madeline marks covers     Has chronic pain all over      c-peptide is low     Worked with [employer] [facility]      Depression and stress affect sugars     A1c: current a1c is 7.6    DM Medications:     Novolin 70/30 insulin: 38 units breakfast, 19 units with dinner (rotates injection sites) - gives right before eating    Last Changes: :none    Sugar Checks: checks up to 2 x day     AM: reports:      PM: reports:          LOWs:  has low sugars - one required EMS    DIET: is working on it, enrolled in [facility] in past    EXERCISE: exercise limited due to pain     HTN: on ARB, HTN followed by PCP     LIPIDS: on statin, lipids followed by PCP    RENAL: has normal renal function, has normal JEAN PAUL-r , in the past year, is on an ARB     EYES: eye exam in past year, has no retinopathy     DENTAL:  has no teeth     FEET: sees podiatry, no numbness or tingling     HEART:  no chest pain, shortness of breath or claudication, has no cardiac history     ASA:  is on aspirin     SYMPTOMS: no polyuria, thirst or blurred vision     THYROID: no known thyroid issue    DIABETES HISTORY:    From 1st visit 12/16/2021   Type 1 Diabetic diagnosed 1976 near when had oldest child   On insulin since then; though at one point told didn't need   Can also not take 70/30 insulin in AM and not be tommy high by next dose which is odd   No other insulins except mixed insulin   Currently is free      LABS/STUDIES:     Lab Results   Component Value Date/Time    KYM7CTOG 7.5 05/10/2023 02:00 PM    JLS8EAGY 7.8 11/04/2022 12:25 PM    EQA3GWWK 9.0 06/25/2021 01:40 PM

## 2023-07-10 NOTE — PATIENT INSTRUCTIONS
Lower PM dose of 70/30 to 17 units before dinner     Call Ute at the number I gave you to see what is going on with the continuous glucose monitor process    Referral was made to [facility] ([phone]) for education on continuous glucose monitors

## 2023-07-11 RX ORDER — ROSUVASTATIN CALCIUM 20 MG/1
20 TABLET, COATED ORAL NIGHTLY
Qty: 100 TABLET | Refills: 1 | Status: SHIPPED | OUTPATIENT
Start: 2023-07-11

## 2023-07-11 NOTE — TELEPHONE ENCOUNTER
Katharina Montalvo MD ,    Guy Solorio has been flagged for adherence by [place] ([facility]). I have pended refills for your approval.  Per insurer report, LIS-2 - may be able to receive up to a 100-day supply for the same cost as a 30-day supply    LOV:5/10/23  NOV:11/20/23    Thank you,  Cindi CaiD, [facility], toll free: [phone] Option 2    Requested Prescriptions     Pending Prescriptions Disp Refills    rosuvastatin (CRESTOR) 20 MG tablet 100 tablet 1     Sig: Take 1 tablet by mouth at bedtime        ================================================================================    [facility] Ne: ADHERENCE REVIEW  Identified care gap per [employer] fills with [facility]: Statin adherence    Last Visit: 5/10/23  Next Visit: 11/20/23    Patient also appears to be prescribed:ACE/ARB  Medicare and half-way Dual Special Needs Plan - MRDSNP  MA-PCPi  Per insurer report, LIS-2 - may be able to receive up to a 100-day supply for the same cost as a 30-day supply    ASSESSMENT  ACE/ARB ADHERENCE    Insurance Records claims through 7/11/23 (Prior Year [address] = 100% - PASSED; YTD [address] = 100%; Potential Fail Date: 10/2/23): Irbesartan last filled on 4/19/23 for 90 day supply. Next refill due: 7/18/23- MAX 7/27/23   Per Reconcile Dispense (Epic):   3 refills remaining. BP Readings from Last 3 Encounters:   07/10/23 134/78   05/10/23 139/82   03/07/23 136/70     Estimated Creatinine Clearance: 45 mL/min (based on SCr of 0.96 mg/dL). Lab Results   Component Value Date    CREATININE 0.96 06/13/2023     Lab Results   Component Value Date    K 4.4 06/13/2023     STATIN ADHERENCE    Insurance Records claims through 7/11/23 (Prior Year PDC = 100% - PASSED; YTD [address] = FIRST FILL; Potential Fail Date: 7/15/23): Rosuvastatin last filled on 2/10/23 for 90 day supply.  Next refill due: 5/11/23 -PAST DUE 61 DAYS    Per Medication List/

## 2023-07-12 ENCOUNTER — HOSPITAL ENCOUNTER (OUTPATIENT)
Facility: HOSPITAL | Age: 73
Discharge: HOME | End: 2023-07-15
Payer: MEDICARE

## 2023-07-12 DIAGNOSIS — Z12.31 VISIT FOR SCREENING MAMMOGRAM: ICD-10-CM

## 2023-07-12 PROCEDURE — 77067 SCR MAMMO BI INCL CAD: CPT

## 2023-07-17 ENCOUNTER — TELEPHONE (OUTPATIENT)
Facility: CLINIC | Age: 73
End: 2023-07-17

## 2023-07-17 NOTE — TELEPHONE ENCOUNTER
Patient called and wanted a referral sent to her for Dermatology  and for leg and arm pain    Also need her lab work mailed with the other paper work

## 2023-07-17 NOTE — TELEPHONE ENCOUNTER
I called the patient and verified them by name and date of birth. I informed her that I can print and mail the referral to dermatology, labs and a list of orthopedics but she will have to see who accepts her insurance. She stated understanding and is requesting a CAT scan to find out what is causing all her pains. I informed her that I would have to check with Dr. George Dunn to see if she will approve it. If so, I will let her know and mail everything at one time. She stated understanding and had no further questions.

## 2023-07-17 NOTE — TELEPHONE ENCOUNTER
----- Message from Jeannie Prescott sent at 7/17/2023  9:36 AM EDT -----  Subject: Message to Provider    QUESTIONS  Information for Provider? pt. Cory Silver called stating she lost the   paperwork for referrals to dermatology and for a pain specialist for her   arms/legs, please callback pt. to further assist   ---------------------------------------------------------------------------  --------------  Brooke Lebanon Saeid  [phone]; OK to leave message on voicemail  ---------------------------------------------------------------------------  --------------  SCRIPT ANSWERS  Relationship to Patient?  Self

## 2023-07-18 NOTE — TELEPHONE ENCOUNTER
Tell her that for joint pains, X-rays are the first step before CT scan. I recommend she see Orthopedics. The orthopedist will determine if she needs X-rays. Ask her which specific joints are bothering her the most and the reason she wants to see Dermatology.

## 2023-07-19 NOTE — TELEPHONE ENCOUNTER
I called the patient and verified them by name and date of birth. I informed her on the message per Dr. Jim Waters. I will mail her the referral for dermatology, names of orthopedics and lab orders. I advised that when she gets it, she will need to call and make an appointment. She stated understanding and had no further questions.

## 2023-08-02 ENCOUNTER — TELEPHONE (OUTPATIENT)
Facility: CLINIC | Age: 73
End: 2023-08-02

## 2023-08-02 NOTE — TELEPHONE ENCOUNTER
Called pt verified name and . Informed pt to contact her insurance to find out what dermatologist is in network with her insurance.  Once appt is scheduled, give the office a call back and we will submit a new referral .

## 2023-08-02 NOTE — TELEPHONE ENCOUNTER
----- Message from Nam Bedolla sent at 8/1/2023 11:10 AM EDT -----  Subject: Message to Provider    QUESTIONS  Information for Provider? PT needs new referral b/c the doctor she was   referred to is not in network with insurance, She wasn't sure what type of   doctor she was referred to. Please advise  ---------------------------------------------------------------------------  --------------  Cherie Baldwin INFO  [phone]; OK to leave message on voicemail  ---------------------------------------------------------------------------  --------------  SCRIPT ANSWERS  Relationship to Patient?  Self

## 2023-08-03 ENCOUNTER — TELEPHONE (OUTPATIENT)
Facility: CLINIC | Age: 73
End: 2023-08-03

## 2023-08-03 NOTE — TELEPHONE ENCOUNTER
----- Message from Naida Herrera sent at 8/2/2023  4:53 PM EDT -----  Subject: Message to Provider    QUESTIONS  Information for Provider? Tara Parikh made an appointment for 08/18/2023 with Dr. Celestino Pierre at the new location in [place], [address] [place] [address],   and will need a referral put through prior to her appointment. She can be   reached at [phone] ok to leave a message  ---------------------------------------------------------------------------  --------------  [address] [place] [address] Saeid  [phone]; OK to leave message on voicemail  ---------------------------------------------------------------------------  --------------  SCRIPT ANSWERS  Relationship to Patient?  Self

## 2023-08-14 ENCOUNTER — NURSE ONLY (OUTPATIENT)
Age: 73
End: 2023-08-14
Payer: MEDICARE

## 2023-08-14 DIAGNOSIS — E10.65 TYPE 1 DIABETES MELLITUS WITH HYPERGLYCEMIA (HCC): Primary | ICD-10-CM

## 2023-08-14 PROCEDURE — G0108 DIAB MANAGE TRN  PER INDIV: HCPCS

## 2023-08-14 NOTE — PROGRESS NOTES
[employer] [facility]  Diabetes Self-Management Education & Support Program  Encounter Note      Patient has exhausted 2/2 hours of DSMES follow-up DSMES as mandated by Medicare    SUMMARY  Diabetes self-care management training was completed related to healthy eating and monitoring. he participant will practice knowledge and skills related to reducing risks, healthy eating and monitoring, healthy coping and problem solving, and medications to improve diabetes self-management. EVALUATION:  Ms. Marlo Boone is pleased with her most recent A1c. She continues to use the principles of Healthy Plate, take medications as prescribed, and monitors her BG. She states she is getting enough food at 3 meals/day, has mostly cut out snacking and walks to the store across the street from her home at least 1x/week for exercise. She is using Bennett Anthony, but has had issues with the sensors falling off before 14 days. She cannot afford the overlays. I have advised her not to use bandage or other types of tape as it will pull the sensor out of her arm if removed early and also likely cause a skin tear. She is frustrated by this, but willing to be careful when dressing and moving her arm. She understands the function of the arrows, we set her low BG alarm to 80 to give her time to react depending on the rate her BG is falling, she expressed understanding of the 15-15 Rule and has written instructions. She is uncertain why she drops low, food and medication recall do not indicated obvious causes. She does not plan to get the influenza or covid vaccinations this fall, she understands the DM related risks. RECOMMENDATIONS:  Please report ongoing episodes of hypoglycemia to Dr. George Dunn and Dr. Cayden Garvin. Purchase glucose tablets.      TOPICS DISCUSSED TODAY:  WHAT CAN I EAT? 30  HOW CAN BLOOD GLUCOSE MONITORING HELP ME? 30      Next provider visit is scheduled for:   8/18/2023  1:50 PM NEW PATIENT

## 2023-08-30 DIAGNOSIS — E10.42 TYPE 1 DIABETES MELLITUS WITH DIABETIC POLYNEUROPATHY (HCC): ICD-10-CM

## 2023-08-30 NOTE — TELEPHONE ENCOUNTER
PCP: Mazin Arechiga MD     Last appt:  5/10/2023    Future Appointments   Date Time Provider [address]   11/13/2023  2:10 PM Dawn Cummings MD [facility]IAT[facility] [place] BS AMB   11/20/2023  1:20 PM Mazin Arechiga MD [facility] BS AMB          Requested Prescriptions     Pending Prescriptions Disp Refills    gabapentin (NEURONTIN) 300 MG capsule [Pharmacy Med Name: GABAPENTIN 300 MG CAPSULE] 90 capsule 2     Sig: TAKE 1 CAPSULE BY MOUTH 3 TIMES DAILY FOR 90 DAYS.  MAX DAILY AMOUNT: 900 MG

## 2023-08-31 ENCOUNTER — TELEPHONE (OUTPATIENT)
Facility: CLINIC | Age: 73
End: 2023-08-31

## 2023-08-31 RX ORDER — GABAPENTIN 300 MG/1
300 CAPSULE ORAL 3 TIMES DAILY
Qty: 90 CAPSULE | Refills: 2 | Status: SHIPPED | OUTPATIENT
Start: 2023-08-31 | End: 2023-11-29

## 2023-09-22 DIAGNOSIS — M25.551 PAIN IN RIGHT HIP: ICD-10-CM

## 2023-09-22 DIAGNOSIS — M15.9 POLYOSTEOARTHRITIS, UNSPECIFIED: ICD-10-CM

## 2023-09-22 NOTE — TELEPHONE ENCOUNTER
PCP: Manisha Qureshi MD     Last appt:  5/10/2023    Future Appointments   Date Time Provider [address]   11/13/2023  2:10 PM Say Tomlinson MD [facility] PSYCHIATRIC [place] BS AMB   11/20/2023  1:20 PM Manisha Qureshi MD [facility] BS AMB          Requested Prescriptions     Pending Prescriptions Disp Refills    diclofenac (VOLTAREN) 50 MG EC tablet [Pharmacy Med Name: DICLOFENAC SOD EC 50 MG TAB] 60 tablet 3     Sig: TAKE 1 TABLET BY MOUTH TWICE A DAY AS NEEDED FOR PAIN

## 2023-10-10 DIAGNOSIS — E10.65 TYPE 1 DIABETES MELLITUS WITH HYPERGLYCEMIA (HCC): ICD-10-CM

## 2023-10-24 NOTE — TELEPHONE ENCOUNTER
Pt states needs a refill on her Novolin 70/30  Further states her Endocronologist changed her dosage, take 24 units for breakfast if working and 10 unites for dinner if working    And if not working 24 in breakfast and 16 for dinner Doxycycline Pregnancy And Lactation Text: This medication is Pregnancy Category D and not consider safe during pregnancy. It is also excreted in breast milk but is considered safe for shorter treatment courses.

## 2024-01-22 ENCOUNTER — TELEPHONE (OUTPATIENT)
Facility: CLINIC | Age: 74
End: 2024-01-22

## 2024-01-22 NOTE — TELEPHONE ENCOUNTER
Patient has a question about her weight. Call her back to see what the question is.      Received through Staff Messages:    Aide Johnson, Celia Saravia MD  Helnidhi Bird,    [facility] received a call from this pt, she has a question about her weight.  She is not currently my patient, so I am forwarding this to you.      Thank you,  Georgina Johnson RN, [facility]          Previous Messages       ----- Message -----  From: Bernie Marmolejo  Sent: 1/22/2024   4:28 PM EST  To: Aide Johnson RN  Subject: patient                                          This patient called for you patient would like you to give her a call about her weight

## 2024-01-23 NOTE — TELEPHONE ENCOUNTER
Spoke to pt verified name and . Pt stated she had been gaining a lot of weight lately. Her diet consist of Banana, sausage hamberger with cheese, bread cauliflower with string beans. She will drink coffee at times, tea, diet pepsi and water with flavor but rarely. She stated she was seeing Georgina but has been having insurance issues, has not been able to come in for apt. She has been able to take her medications but wanted to know what she could do to help with the weight. I did recommend for her to stop drinking the tea and diet pepsi.

## 2024-01-24 DIAGNOSIS — E10.42 TYPE 1 DIABETES MELLITUS WITH DIABETIC POLYNEUROPATHY (HCC): ICD-10-CM

## 2024-01-24 RX ORDER — PEN NEEDLE, DIABETIC 29 G X1/2"
NEEDLE, DISPOSABLE MISCELLANEOUS
Qty: 100 EACH | Refills: 5 | Status: SHIPPED | OUTPATIENT
Start: 2024-01-24

## 2024-01-24 NOTE — TELEPHONE ENCOUNTER
PCP: Celia Bird MD     Last appt:  5/10/2023      Future Appointments   Date Time Provider Department Center   2/2/2024 11:30 AM Celai Bird MD [facility] BS [facility]   2/13/2024  1:30 PM Concetta Mendoza MD [facility] BS AMB          Requested Prescriptions     Pending Prescriptions Disp Refills    BD INSULIN SYRINGE U/F 31G X 5/16\" 0.5 ML MISC [Pharmacy Med Name: BD INS SYRNG UF 0.5 ML [id number]] 100 each 5     Sig: TO INJECT INSULIN TWICE DAILY. DX: E10.42

## 2024-01-24 NOTE — TELEPHONE ENCOUNTER
Tell her to cut down on bread also. She can discuss weight gain and diet more with Dr. Bird at appointment on 2/2/24.

## 2024-02-02 ENCOUNTER — TELEPHONE (OUTPATIENT)
Facility: CLINIC | Age: 74
End: 2024-02-02

## 2024-02-02 ENCOUNTER — OFFICE VISIT (OUTPATIENT)
Facility: CLINIC | Age: 74
End: 2024-02-02
Payer: MEDICARE

## 2024-02-02 VITALS
TEMPERATURE: 97.8 F | SYSTOLIC BLOOD PRESSURE: 131 MMHG | DIASTOLIC BLOOD PRESSURE: 78 MMHG | OXYGEN SATURATION: 95 % | RESPIRATION RATE: 16 BRPM | BODY MASS INDEX: 30.1 KG/M2 | WEIGHT: 143.4 LBS | HEIGHT: 58 IN | HEART RATE: 77 BPM

## 2024-02-02 DIAGNOSIS — E10.42 TYPE 1 DIABETES MELLITUS WITH DIABETIC POLYNEUROPATHY (HCC): ICD-10-CM

## 2024-02-02 DIAGNOSIS — M54.50 CHRONIC BILATERAL LOW BACK PAIN, UNSPECIFIED WHETHER SCIATICA PRESENT: Primary | ICD-10-CM

## 2024-02-02 DIAGNOSIS — I10 ESSENTIAL HYPERTENSION: Primary | ICD-10-CM

## 2024-02-02 DIAGNOSIS — J30.2 SEASONAL ALLERGIC RHINITIS, UNSPECIFIED TRIGGER: ICD-10-CM

## 2024-02-02 DIAGNOSIS — F33.0 MAJOR DEPRESSIVE DISORDER, RECURRENT, MILD (HCC): ICD-10-CM

## 2024-02-02 DIAGNOSIS — G89.29 CHRONIC BILATERAL LOW BACK PAIN, UNSPECIFIED WHETHER SCIATICA PRESENT: Primary | ICD-10-CM

## 2024-02-02 PROCEDURE — 3075F SYST BP GE 130 - 139MM HG: CPT | Performed by: INTERNAL MEDICINE

## 2024-02-02 PROCEDURE — 1123F ACP DISCUSS/DSCN MKR DOCD: CPT | Performed by: INTERNAL MEDICINE

## 2024-02-02 PROCEDURE — 3078F DIAST BP <80 MM HG: CPT | Performed by: INTERNAL MEDICINE

## 2024-02-02 PROCEDURE — 99214 OFFICE O/P EST MOD 30 MIN: CPT | Performed by: INTERNAL MEDICINE

## 2024-02-02 ASSESSMENT — PATIENT HEALTH QUESTIONNAIRE - PHQ9
3. TROUBLE FALLING OR STAYING ASLEEP: 0
SUM OF ALL RESPONSES TO PHQ QUESTIONS 1-9: 0
SUM OF ALL RESPONSES TO PHQ QUESTIONS 1-9: 0
7. TROUBLE CONCENTRATING ON THINGS, SUCH AS READING THE NEWSPAPER OR WATCHING TELEVISION: 0
9. THOUGHTS THAT YOU WOULD BE BETTER OFF DEAD, OR OF HURTING YOURSELF: 0
8. MOVING OR SPEAKING SO SLOWLY THAT OTHER PEOPLE COULD HAVE NOTICED. OR THE OPPOSITE, BEING SO FIGETY OR RESTLESS THAT YOU HAVE BEEN MOVING AROUND A LOT MORE THAN USUAL: 0
6. FEELING BAD ABOUT YOURSELF - OR THAT YOU ARE A FAILURE OR HAVE LET YOURSELF OR YOUR FAMILY DOWN: 0
SUM OF ALL RESPONSES TO PHQ QUESTIONS 1-9: 0
SUM OF ALL RESPONSES TO PHQ9 QUESTIONS 1 & 2: 0
10. IF YOU CHECKED OFF ANY PROBLEMS, HOW DIFFICULT HAVE THESE PROBLEMS MADE IT FOR YOU TO DO YOUR WORK, TAKE CARE OF THINGS AT HOME, OR GET ALONG WITH OTHER PEOPLE: 0
2. FEELING DOWN, DEPRESSED OR HOPELESS: 0
1. LITTLE INTEREST OR PLEASURE IN DOING THINGS: 0
5. POOR APPETITE OR OVEREATING: 0
SUM OF ALL RESPONSES TO PHQ QUESTIONS 1-9: 0
4. FEELING TIRED OR HAVING LITTLE ENERGY: 0
SUM OF ALL RESPONSES TO PHQ QUESTIONS 1-9: 0
SUM OF ALL RESPONSES TO PHQ QUESTIONS 1-9: 0

## 2024-02-02 NOTE — PROGRESS NOTES
Chief Complaint   Patient presents with    Diabetes    Hypertension       HISTORY OF PRESENT ILLNESS  Kierra Nunes is a 73 y.o. female    Presents for follow up evaluation. Missed her Medicare AWV in Nov due to insurance-related issues.    Complains of runny nose, sneezing, and itchy eyes on and off since October. Thought it was a cold. Complains of sharp pain that starts at right foot and goes up to back.    Wants to get off Wellbutrin because not helping her depression. Was previously on Cymbalta in 2019 that didn't help depression either.    Has upcoming appointments with Dr. Mendoza for type 1 DM, Podiatry for neuropathy, and Dermatology for itchy scalp.    Patient Active Problem List   Diagnosis    Osteopenia    Allergic rhinitis    Essential hypertension    Vitamin D deficiency    Primary osteoarthritis involving multiple joints    Mild episode of recurrent major depressive disorder (HCC)    Obesity (BMI 30-39.9)    Mixed hyperlipidemia    Type 1 diabetes mellitus with diabetic polyneuropathy (HCC)    Repeated falls     Past Medical History:   Diagnosis Date    Arthritis     Chronic left shoulder pain 5/10/2017    Chronic pain syndrome 9/27/2017    Cross-eye     Depression     Diabetes (HCC)     Type 1    Flatulence     Hypercholesterolemia     Hypertension     Menopause     LMP-58 years old?    Sinus congestion     Traumatic complete tear of right rotator cuff 9/24/2018 9/20/18, Dr. Hankins: probable R torn rotator cuff. MRI ordered. MRI 11/14/18: complete tear of supraspinatus tendon. 2/28/19, Dr. Rhoda Mosher, right shoulder arthroscopy, rotator cuff repair. 7/1/19, Dr. Mosher: repair is intact. Has glenohumeral OA at right shoulder causing stiffness and soreness, given cortisone shot     Allergies   Allergen Reactions    Naproxen Other (See Comments)     Belching,gas,stomach pain    Oxycodone-Acetaminophen Other (See Comments)     Visual problems/seeings in triplicate per patient    Prochlorperazine

## 2024-02-02 NOTE — PROGRESS NOTES
Kierra Saavedrazie  Identified pt with two pt identifiers(name and ).  Chief Complaint   Patient presents with    Diabetes    Hypertension       1. Have you been to the ER, urgent care clinic since your last visit?  Hospitalized since your last visit? NO    2. Have you seen or consulted any other health care providers outside of the [facility] since your last visit?  Include any pap smears or colon screening. NO      Provider notified of reason for visit, vitals and flowsheets obtained on patients.     Patient received paperwork for advance directive during previous visit but has not completed at this time     Reviewed record In preparation for visit, huddled with provider and have obtained necessary documentation      Health Maintenance Due   Topic    Diabetic foot exam     Lipids     A1C test (Diabetic or Prediabetic)     Annual Wellness Visit (Medicare Advantage)        Wt Readings from Last 3 Encounters:   23 62.6 kg (138 lb)   07/10/23 62.1 kg (137 lb)   05/10/23 62.8 kg (138 lb 6.4 oz)     Temp Readings from Last 3 Encounters:   05/10/23 98.4 °F (36.9 °C) (Oral)     BP Readings from Last 3 Encounters:   07/10/23 134/78   05/10/23 139/82   23 136/70     Pulse Readings from Last 3 Encounters:   07/10/23 75   05/10/23 75   23 73          No data to display                  Learning Assessment:  :          No data to display                Fall Risk Assessment:  :         2024    11:47 AM 2022     2:18 PM 2021    11:12 AM 2021     1:27 PM   Amb Fall Risk Assessment and TUG Test   Do you feel unsteady or are you worried about falling?  no      2 or more falls in past year? no      Fall with injury in past year? no      Fall in past 12 months?  1 0 1   Able to walk?  Yes Yes Yes       Abuse Screening:  :          No data to display                ADL Screening:  :         2024    11:00 AM   ADL ASSESSMENT   Feeding yourself No Help Needed   Getting from bed to

## 2024-02-02 NOTE — PATIENT INSTRUCTIONS
Allergy Treatments:  1. Anti-histamines: Claritin (loratadine) or Zyrtec (cetirizine) or Allegra (fexofenadine), all are over the counter  2. Nasal steroids: Nasacort and Flonase (are over the counter)  3. Nasal rinses:  Saline rinses or salt water rinses or Neti pot     Take Bupropion 1 tablet every other day for 2 weeks, then every 4 days for 2 weeks, then once a week for 2 weeks, then stop.

## 2024-02-02 NOTE — TELEPHONE ENCOUNTER
The patient is requesting a referral for:    Provider:    Would like PCP to recommend   Phone:    Fax:     Specialty:   Ortho   Appt date:    Diagnosis:  Back, hip, and leg pain       Advised patient of 48-72 hour turnaround for insurance referrals.

## 2024-02-09 DIAGNOSIS — E55.9 VITAMIN D DEFICIENCY: ICD-10-CM

## 2024-02-09 DIAGNOSIS — E78.2 MIXED HYPERLIPIDEMIA: ICD-10-CM

## 2024-02-09 DIAGNOSIS — I10 ESSENTIAL HYPERTENSION: ICD-10-CM

## 2024-02-09 DIAGNOSIS — E10.42 TYPE 1 DIABETES MELLITUS WITH DIABETIC POLYNEUROPATHY (HCC): ICD-10-CM

## 2024-02-10 LAB
25(OH)D3 SERPL-MCNC: 33.3 NG/ML (ref 30–100)
ALBUMIN SERPL-MCNC: 4 G/DL (ref 3.5–5)
ALBUMIN/GLOB SERPL: 1.4 (ref 1.1–2.2)
ALP SERPL-CCNC: 105 U/L (ref 45–117)
ALT SERPL-CCNC: 71 U/L (ref 12–78)
ANION GAP SERPL CALC-SCNC: 4 MMOL/L (ref 5–15)
AST SERPL-CCNC: 37 U/L (ref 15–37)
BASOPHILS # BLD: 0.1 K/UL (ref 0–0.1)
BASOPHILS NFR BLD: 1 % (ref 0–1)
BILIRUB SERPL-MCNC: 0.7 MG/DL (ref 0.2–1)
BUN SERPL-MCNC: 33 MG/DL (ref 6–20)
BUN/CREAT SERPL: 40 (ref 12–20)
CALCIUM SERPL-MCNC: 9.2 MG/DL (ref 8.5–10.1)
CHLORIDE SERPL-SCNC: 105 MMOL/L (ref 97–108)
CHOLEST SERPL-MCNC: 141 MG/DL
CO2 SERPL-SCNC: 32 MMOL/L (ref 21–32)
CREAT SERPL-MCNC: 0.83 MG/DL (ref 0.55–1.02)
DIFFERENTIAL METHOD BLD: NORMAL
EOSINOPHIL # BLD: 0.4 K/UL (ref 0–0.4)
EOSINOPHIL NFR BLD: 5 % (ref 0–7)
ERYTHROCYTE [DISTWIDTH] IN BLOOD BY AUTOMATED COUNT: 12.4 % (ref 11.5–14.5)
EST. AVERAGE GLUCOSE BLD GHB EST-MCNC: 151 MG/DL
GLOBULIN SER CALC-MCNC: 2.8 G/DL (ref 2–4)
GLUCOSE SERPL-MCNC: 109 MG/DL (ref 65–100)
HBA1C MFR BLD: 6.9 % (ref 4–5.6)
HCT VFR BLD AUTO: 41.2 % (ref 35–47)
HDLC SERPL-MCNC: 59 MG/DL
HDLC SERPL: 2.4 (ref 0–5)
HGB BLD-MCNC: 13.5 G/DL (ref 11.5–16)
IMM GRANULOCYTES # BLD AUTO: 0 K/UL (ref 0–0.04)
IMM GRANULOCYTES NFR BLD AUTO: 0 % (ref 0–0.5)
LDLC SERPL CALC-MCNC: 68.8 MG/DL (ref 0–100)
LYMPHOCYTES # BLD: 3.2 K/UL (ref 0.8–3.5)
LYMPHOCYTES NFR BLD: 38 % (ref 12–49)
MCH RBC QN AUTO: 29.9 PG (ref 26–34)
MCHC RBC AUTO-ENTMCNC: 32.8 G/DL (ref 30–36.5)
MCV RBC AUTO: 91.2 FL (ref 80–99)
MONOCYTES # BLD: 0.8 K/UL (ref 0–1)
MONOCYTES NFR BLD: 10 % (ref 5–13)
NEUTS SEG # BLD: 3.8 K/UL (ref 1.8–8)
NEUTS SEG NFR BLD: 46 % (ref 32–75)
NRBC # BLD: 0 K/UL (ref 0–0.01)
NRBC BLD-RTO: 0 PER 100 WBC
PLATELET # BLD AUTO: 234 K/UL (ref 150–400)
PMV BLD AUTO: 11.9 FL (ref 8.9–12.9)
POTASSIUM SERPL-SCNC: 4.1 MMOL/L (ref 3.5–5.1)
PROT SERPL-MCNC: 6.8 G/DL (ref 6.4–8.2)
RBC # BLD AUTO: 4.52 M/UL (ref 3.8–5.2)
SODIUM SERPL-SCNC: 141 MMOL/L (ref 136–145)
TRIGL SERPL-MCNC: 66 MG/DL
VLDLC SERPL CALC-MCNC: 13.2 MG/DL
WBC # BLD AUTO: 8.3 K/UL (ref 3.6–11)

## 2024-02-13 ENCOUNTER — OFFICE VISIT (OUTPATIENT)
Age: 74
End: 2024-02-13
Payer: MEDICARE

## 2024-02-13 VITALS — HEIGHT: 58 IN | BODY MASS INDEX: 29.97 KG/M2

## 2024-02-13 DIAGNOSIS — I10 ESSENTIAL (PRIMARY) HYPERTENSION: ICD-10-CM

## 2024-02-13 DIAGNOSIS — E78.2 MIXED HYPERLIPIDEMIA: ICD-10-CM

## 2024-02-13 DIAGNOSIS — E10.65 TYPE 1 DIABETES MELLITUS WITH HYPERGLYCEMIA (HCC): Primary | ICD-10-CM

## 2024-02-13 PROCEDURE — 3044F HG A1C LEVEL LT 7.0%: CPT | Performed by: INTERNAL MEDICINE

## 2024-02-13 PROCEDURE — 1123F ACP DISCUSS/DSCN MKR DOCD: CPT | Performed by: INTERNAL MEDICINE

## 2024-02-13 PROCEDURE — 95251 CONT GLUC MNTR ANALYSIS I&R: CPT | Performed by: INTERNAL MEDICINE

## 2024-02-13 PROCEDURE — 99214 OFFICE O/P EST MOD 30 MIN: CPT | Performed by: INTERNAL MEDICINE

## 2024-02-13 NOTE — PROGRESS NOTES
Chief Complaint   Patient presents with    Diabetes       Patient was last seen: 4 months ago 7/10/2023 OVER-DUE      General:   Medicare/medicaid    No new issues     Did get lana 2      Has chronic pain all over      c-peptide is low     Worked with [employer] [facility]      Depression and stress affect sugars     A1c: current a1c is 6.9    DM Medications:     Novolin 70/30 insulin: 38 units breakfast, 17 units with dinner (rotates injection sites) - gives right before eating    Last Changes: :none    Sugar Checks: checks more than 4 times a day and uses [employer] CGM  30 day 152--148--154--153      AM: reports:      PM: reports:          LOWs:  has low sugars  -none since on CGM     DIET: is working on it, enrolled in [facility] in past    EXERCISE: exercise limited due to pain     HTN: on ARB, HTN followed by PCP     LIPIDS: on statin, lipids followed by PCP    RENAL: has normal renal function, has normal JEAN PAUL-r , in the past year, is on an ARB     EYES: eye exam in past year, has no retinopathy     DENTAL:  has no teeth     FEET: sees podiatry, no numbness or tingling     HEART:  no chest pain, shortness of breath or claudication, has no cardiac history     ASA:  is on aspirin     SYMPTOMS: no polyuria, thirst or blurred vision     THYROID: no known thyroid issue    DIABETES HISTORY:    From 1st visit 12/16/2021   Type 1 Diabetic diagnosed 1976 near when had oldest child   On insulin since then; though at one point told didn't need   Can also not take 70/30 insulin in AM and not be tommy high by next dose which is odd   No other insulins except mixed insulin   Currently is free    LABS/STUDIES:     Lab Results   Component Value Date/Time    GXL9HFNI 7.5 05/10/2023 02:00 PM    AXD6MFHI 7.8 11/04/2022 12:25 PM    QBY8QLUU 9.0 06/25/2021 01:40 PM    UPM1NNEH 9.0 10/21/2020 02:14 PM    JZD5FTLW 8.8 01/14/2020 01:28 PM    FXB0DXMF 9.4 07/31/2019 01:22 PM       Lab Results

## 2024-02-20 DIAGNOSIS — M15.9 POLYOSTEOARTHRITIS, UNSPECIFIED: ICD-10-CM

## 2024-02-20 DIAGNOSIS — M25.551 PAIN IN RIGHT HIP: ICD-10-CM

## 2024-02-20 NOTE — TELEPHONE ENCOUNTER
PCP: Celia Bird MD     Last appt:  2/2/2024      Future Appointments   Date Time Provider Department Center   3/7/2024  1:00 PM Ailyn Mendes PA [facility] BS AMB   3/11/2024  1:00 PM Aide Johnson RN [facility] BS AMB   6/28/2024  2:00 PM Celia Bird MD [facility] BS AMB   8/16/2024  1:30 PM Concetta Mendoza MD [facility] BS AMB          Requested Prescriptions     Pending Prescriptions Disp Refills    diclofenac (VOLTAREN) 50 MG EC tablet [Pharmacy Med Name: DICLOFENAC SOD EC 50 MG TAB] 60 tablet 3     Sig: TAKE 1 TABLET BY MOUTH TWICE A DAY AS NEEDED FOR PAIN

## 2024-03-07 ENCOUNTER — TELEPHONE (OUTPATIENT)
Facility: CLINIC | Age: 74
End: 2024-03-07

## 2024-03-07 DIAGNOSIS — F33.1 MODERATE EPISODE OF RECURRENT MAJOR DEPRESSIVE DISORDER (HCC): Primary | ICD-10-CM

## 2024-03-07 DIAGNOSIS — F51.04 CHRONIC INSOMNIA: ICD-10-CM

## 2024-03-07 RX ORDER — QUETIAPINE FUMARATE 50 MG/1
50 TABLET, EXTENDED RELEASE ORAL NIGHTLY
Qty: 30 TABLET | Refills: 1 | Status: SHIPPED | OUTPATIENT
Start: 2024-03-07

## 2024-03-07 NOTE — TELEPHONE ENCOUNTER
Pt stated provider was going to take her off of welbutrin and put her on something else but she states she does not know what the other medication was, please give call back

## 2024-03-11 ENCOUNTER — OFFICE VISIT (OUTPATIENT)
Age: 74
End: 2024-03-11
Payer: MEDICARE

## 2024-03-11 DIAGNOSIS — E10.65 TYPE 1 DIABETES MELLITUS WITH HYPERGLYCEMIA (HCC): Primary | ICD-10-CM

## 2024-03-11 PROCEDURE — G0108 DIAB MANAGE TRN  PER INDIV: HCPCS

## 2024-03-11 NOTE — PROGRESS NOTES
days ago, no intolerable side effects noted.     RECOMMENDATIONS:  Work on choosing unsaturated fat options more often, leaner cuts of meat more often (especially with hamburger meat), add more non-starchy vegetables to your plate at least at one meal.     TOPICS DISCUSSED TODAY:  WHAT CAN I EAT? 30  HOW DOES PHYSICAL ACTIVITY AFFECT MY DIABETES? 30      Next provider visit is scheduled for TBD, pt to call [facility] to schedule.         3/11/2024 WHAT CAN I EAT?   General principles   Determining a healthy weight   Nutritional terms & tools   Healthy Plate method   Carbohydrate Counting   Reading food labels   Free apps   Pregnancy recommendations      The participant   Uses Healthy Plate principles in constructing meals: Yes  Reads food labels in choosing acceptable foods: Yes    The participant needs to address choosing leaner meat options more often and increasing non-starchy vegetable intake at one meal at least.     DATE DSMES TOPIC EVALUATION     3/11/2024 HOW DOES PHYSICAL ACTIVITY AFFECT MY DIABETES?   Benefits of physical activity   Beginning a program of physical activity   Walking   Pedometers   Goal setting   Structured physical activity program   Aerobic activity   Resistance   Flexibility   Balance   Physical activity program progression   Safety issues   Barriers to physical activity   Facilitators of physical activity        The participant has established a regular physical activity plan: No    The participant needs to address increasing walking once back and leg pain is resolved/better controlled.     PASCALE ABARCA RN on 3/11/2024 at 2:03 PM    I have personally reviewed the health record, including provider notes, laboratory data and current medications before making these care and education recommendations. The time spent in this effort is included in the total time.  Total minutes: 60    C

## 2024-03-27 DIAGNOSIS — E10.42 TYPE 1 DIABETES MELLITUS WITH DIABETIC POLYNEUROPATHY (HCC): ICD-10-CM

## 2024-03-28 RX ORDER — GABAPENTIN 300 MG/1
300 CAPSULE ORAL 3 TIMES DAILY
Qty: 90 CAPSULE | Refills: 2 | Status: SHIPPED | OUTPATIENT
Start: 2024-03-28 | End: 2024-06-26

## 2024-03-28 NOTE — TELEPHONE ENCOUNTER
PCP: Celia Bird MD     Last appt:  2/2/2024      Future Appointments   Date Time Provider Department Center   4/18/2024  1:40 PM Rock Toledo MD [facility] BS AMB   6/28/2024  2:00 PM Celia Bird MD [facility] BS AMB   8/16/2024  1:30 PM Concetta Mendoza MD [facility] BS AMB          Requested Prescriptions     Pending Prescriptions Disp Refills    gabapentin (NEURONTIN) 300 MG capsule [Pharmacy Med Name: GABAPENTIN 300 MG CAPSULE] 90 capsule 2     Sig: Take 1 capsule by mouth 3 times daily for 90 days. Max Daily Amount: 900 mg

## 2024-04-02 DIAGNOSIS — F51.04 CHRONIC INSOMNIA: ICD-10-CM

## 2024-04-02 DIAGNOSIS — F33.1 MODERATE EPISODE OF RECURRENT MAJOR DEPRESSIVE DISORDER (HCC): ICD-10-CM

## 2024-04-02 RX ORDER — QUETIAPINE FUMARATE 50 MG/1
50 TABLET, EXTENDED RELEASE ORAL NIGHTLY
Qty: 90 TABLET | Refills: 0 | Status: SHIPPED | OUTPATIENT
Start: 2024-04-02

## 2024-04-21 DIAGNOSIS — E10.42 TYPE 1 DIABETES MELLITUS WITH DIABETIC POLYNEUROPATHY (HCC): ICD-10-CM

## 2024-04-21 RX ORDER — INSULIN HUMAN 100 [IU]/ML
INJECTION, SUSPENSION SUBCUTANEOUS
Qty: 50 ML | Refills: 2 | Status: SHIPPED | OUTPATIENT
Start: 2024-04-21

## 2024-04-30 DIAGNOSIS — I10 ESSENTIAL (PRIMARY) HYPERTENSION: ICD-10-CM

## 2024-05-01 RX ORDER — IRBESARTAN 150 MG/1
150 TABLET ORAL DAILY
Qty: 90 TABLET | Refills: 3 | Status: SHIPPED | OUTPATIENT
Start: 2024-05-01

## 2024-05-01 NOTE — TELEPHONE ENCOUNTER
PCP: Celia Bird MD     Last appt:  2/2/2024      Future Appointments   Date Time Provider Department Center   6/28/2024  2:00 PM Celia Bird MD [facility] BS [facility]   8/16/2024  1:30 PM Concetta Mendoza MD [facility] BS AMB          Requested Prescriptions     Pending Prescriptions Disp Refills    irbesartan (AVAPRO) 150 MG tablet [Pharmacy Med Name: IRBESARTAN 150 MG TABLET] 90 tablet 3     Sig: TAKE 1 TABLET BY MOUTH EVERY DAY

## 2024-05-28 DIAGNOSIS — F51.04 CHRONIC INSOMNIA: ICD-10-CM

## 2024-05-28 DIAGNOSIS — F33.1 MODERATE EPISODE OF RECURRENT MAJOR DEPRESSIVE DISORDER (HCC): ICD-10-CM

## 2024-05-28 NOTE — TELEPHONE ENCOUNTER
PCP: Celia Bird MD     Last appt:  2/2/2024      Future Appointments   Date Time Provider Department Center   6/28/2024  2:00 PM Celia Bird MD [facility] BS [facility]   8/16/2024  1:30 PM Concetta Mendoza MD [facility]          Requested Prescriptions     Pending Prescriptions Disp Refills    QUEtiapine (SEROQUEL XR) 50 MG extended release tablet [Pharmacy Med Name: QUETIAPINE ER 50 MG TABLET] 90 tablet 0     Sig: TAKE 1 TABLET BY MOUTH EVERY DAY AT NIGHT

## 2024-05-30 RX ORDER — QUETIAPINE FUMARATE 50 MG/1
50 TABLET, EXTENDED RELEASE ORAL NIGHTLY
Qty: 90 TABLET | Refills: 0 | Status: SHIPPED | OUTPATIENT
Start: 2024-05-30

## 2024-06-11 ENCOUNTER — ENROLLMENT (OUTPATIENT)
Dept: PHARMACY | Facility: CLINIC | Age: 74
End: 2024-06-11

## 2024-06-27 NOTE — TELEPHONE ENCOUNTER
Celia Bird MD     Kierra Nunes has an appointment with you 6/28/2024 and has been identified with a care gap for Statin Use in Person With Diabetes (SUPD).    Per patient chart review: Is currently prescribed ROSUVASTATIN 20 mg, and was marked \"taking\" at the last office visit. This medication has not been filled in calendar year 2024. The current prescription has 0 refills remaining. I have pended refills for your approval if continuation of this medication is appropriate.     If Kierra Nunes cannot take a statin due to any of the following, please place a dx code in the patient's visit to have Kierra Nunes excluded from this care gap for 2024.     Please submit one of the following CMS allowable diagnoses codes as a visit diagnosis to Office visit 6/28/2024:  Myopathy (G72.0, G72.89, G72.9)  Rhabdomyolysis (M62.82)  Prediabetes (R73.03, R73.09)  ESRD (N18.5, N18.6, Z99.2) or Dialysis  Cirrhosis (K74.60, K70.30, K70.31, K71.7, K74.3, K74.4, K74.5, K74.69)  Polycystic Ovary Syndrome (PCOS- E28.2), Pregnancy, Lactation or Fertility    Please let me know if you have any questions!    Thank you,  Kaylyn Mills, PharmD, [facility]  Population Health Pharmacist  Critical access hospital Clinical Pharmacy   Department, toll free: [phone] Option 1    Requested Prescriptions     Pending Prescriptions Disp Refills    rosuvastatin (CRESTOR) 20 MG tablet 100 tablet 3     Sig: Take 1 tablet by mouth at bedtime       ===============================================================================   POPULATION HEALTH CLINICAL PHARMACY: STATIN THERAPY REVIEW  Identified Statin Use In Diabetes care gap per [employer] Records dated:06/27/24     Statin Use in Persons with Diabetes Definition:  Eligible:Members with diabetes ages 40-75 during the measurement year    Definition: Medicare members with diabetes ages 40-75 who receive at least 1 fill of a statin medication in the measurement year   Members with diabetes are defined as those who

## 2024-06-28 ENCOUNTER — OFFICE VISIT (OUTPATIENT)
Facility: CLINIC | Age: 74
End: 2024-06-28

## 2024-06-28 VITALS
TEMPERATURE: 98.1 F | HEIGHT: 58 IN | BODY MASS INDEX: 30.06 KG/M2 | SYSTOLIC BLOOD PRESSURE: 117 MMHG | DIASTOLIC BLOOD PRESSURE: 77 MMHG | WEIGHT: 143.2 LBS | OXYGEN SATURATION: 95 % | HEART RATE: 77 BPM | RESPIRATION RATE: 16 BRPM

## 2024-06-28 DIAGNOSIS — Z00.00 MEDICARE ANNUAL WELLNESS VISIT, SUBSEQUENT: Primary | ICD-10-CM

## 2024-06-28 DIAGNOSIS — E66.9 OBESITY (BMI 30-39.9): ICD-10-CM

## 2024-06-28 DIAGNOSIS — I10 ESSENTIAL HYPERTENSION: ICD-10-CM

## 2024-06-28 DIAGNOSIS — E10.42 TYPE 1 DIABETES MELLITUS WITH DIABETIC POLYNEUROPATHY (HCC): ICD-10-CM

## 2024-06-28 DIAGNOSIS — Z12.11 SCREENING FOR COLON CANCER: ICD-10-CM

## 2024-06-28 DIAGNOSIS — E78.2 MIXED HYPERLIPIDEMIA: ICD-10-CM

## 2024-06-28 DIAGNOSIS — F33.0 MILD EPISODE OF RECURRENT MAJOR DEPRESSIVE DISORDER (HCC): ICD-10-CM

## 2024-06-28 LAB
ALBUMIN SERPL-MCNC: 4 G/DL (ref 3.5–5)
ALBUMIN/GLOB SERPL: 1.4 (ref 1.1–2.2)
ALP SERPL-CCNC: 101 U/L (ref 45–117)
ALT SERPL-CCNC: 28 U/L (ref 12–78)
ANION GAP SERPL CALC-SCNC: 2 MMOL/L (ref 5–15)
AST SERPL-CCNC: 24 U/L (ref 15–37)
BILIRUB SERPL-MCNC: 1 MG/DL (ref 0.2–1)
BUN SERPL-MCNC: 29 MG/DL (ref 6–20)
BUN/CREAT SERPL: 35 (ref 12–20)
CALCIUM SERPL-MCNC: 10.2 MG/DL (ref 8.5–10.1)
CHLORIDE SERPL-SCNC: 104 MMOL/L (ref 97–108)
CO2 SERPL-SCNC: 34 MMOL/L (ref 21–32)
CREAT SERPL-MCNC: 0.83 MG/DL (ref 0.55–1.02)
CREAT UR-MCNC: 207 MG/DL
GLOBULIN SER CALC-MCNC: 2.9 G/DL (ref 2–4)
GLUCOSE SERPL-MCNC: 96 MG/DL (ref 65–100)
MICROALBUMIN UR-MCNC: 2.32 MG/DL
MICROALBUMIN/CREAT UR-RTO: 11 MG/G (ref 0–30)
POTASSIUM SERPL-SCNC: 4.2 MMOL/L (ref 3.5–5.1)
PROT SERPL-MCNC: 6.9 G/DL (ref 6.4–8.2)
SODIUM SERPL-SCNC: 140 MMOL/L (ref 136–145)
SPECIMEN HOLD: NORMAL

## 2024-06-28 RX ORDER — ROSUVASTATIN CALCIUM 20 MG/1
20 TABLET, COATED ORAL NIGHTLY
Qty: 100 TABLET | Refills: 3 | Status: SHIPPED | OUTPATIENT
Start: 2024-06-28

## 2024-06-28 SDOH — ECONOMIC STABILITY: FOOD INSECURITY: WITHIN THE PAST 12 MONTHS, YOU WORRIED THAT YOUR FOOD WOULD RUN OUT BEFORE YOU GOT MONEY TO BUY MORE.: NEVER TRUE

## 2024-06-28 SDOH — ECONOMIC STABILITY: FOOD INSECURITY: WITHIN THE PAST 12 MONTHS, THE FOOD YOU BOUGHT JUST DIDN'T LAST AND YOU DIDN'T HAVE MONEY TO GET MORE.: NEVER TRUE

## 2024-06-28 SDOH — ECONOMIC STABILITY: INCOME INSECURITY: HOW HARD IS IT FOR YOU TO PAY FOR THE VERY BASICS LIKE FOOD, HOUSING, MEDICAL CARE, AND HEATING?: NOT HARD AT ALL

## 2024-06-28 ASSESSMENT — PATIENT HEALTH QUESTIONNAIRE - PHQ9
2. FEELING DOWN, DEPRESSED OR HOPELESS: NOT AT ALL
9. THOUGHTS THAT YOU WOULD BE BETTER OFF DEAD, OR OF HURTING YOURSELF: NOT AT ALL
8. MOVING OR SPEAKING SO SLOWLY THAT OTHER PEOPLE COULD HAVE NOTICED. OR THE OPPOSITE, BEING SO FIGETY OR RESTLESS THAT YOU HAVE BEEN MOVING AROUND A LOT MORE THAN USUAL: NOT AT ALL
SUM OF ALL RESPONSES TO PHQ QUESTIONS 1-9: 0
7. TROUBLE CONCENTRATING ON THINGS, SUCH AS READING THE NEWSPAPER OR WATCHING TELEVISION: NOT AT ALL
6. FEELING BAD ABOUT YOURSELF - OR THAT YOU ARE A FAILURE OR HAVE LET YOURSELF OR YOUR FAMILY DOWN: NOT AT ALL
4. FEELING TIRED OR HAVING LITTLE ENERGY: NOT AT ALL
3. TROUBLE FALLING OR STAYING ASLEEP: NOT AT ALL
1. LITTLE INTEREST OR PLEASURE IN DOING THINGS: NOT AT ALL
SUM OF ALL RESPONSES TO PHQ QUESTIONS 1-9: 0
SUM OF ALL RESPONSES TO PHQ QUESTIONS 1-9: 0
SUM OF ALL RESPONSES TO PHQ9 QUESTIONS 1 & 2: 0
SUM OF ALL RESPONSES TO PHQ QUESTIONS 1-9: 0
5. POOR APPETITE OR OVEREATING: NOT AT ALL
10. IF YOU CHECKED OFF ANY PROBLEMS, HOW DIFFICULT HAVE THESE PROBLEMS MADE IT FOR YOU TO DO YOUR WORK, TAKE CARE OF THINGS AT HOME, OR GET ALONG WITH OTHER PEOPLE: NOT DIFFICULT AT ALL

## 2024-06-28 ASSESSMENT — LIFESTYLE VARIABLES
HOW OFTEN DO YOU HAVE A DRINK CONTAINING ALCOHOL: NEVER
HOW MANY STANDARD DRINKS CONTAINING ALCOHOL DO YOU HAVE ON A TYPICAL DAY: PATIENT DOES NOT DRINK

## 2024-06-28 NOTE — PROGRESS NOTES
Kierra Nunes  Identified pt with two pt identifiers(name and ).  Chief Complaint   Patient presents with    Medicare AWV       1. Have you been to the ER, urgent care clinic since your last visit?  Hospitalized since your last visit? NO    2. Have you seen or consulted any other health care providers outside of the [facility] since your last visit?  Include any pap smears or colon screening. Pt has been to the eye doc in the past yr, seen at [facility].       Provider notified of reason for visit, vitals and flowsheets obtained on patients.     Patient received paperwork for advance directive during previous visit but has not completed at this time     Reviewed record In preparation for visit, huddled with provider and have obtained necessary documentation      Health Maintenance Due   Topic    Diabetic foot exam     Annual Wellness Visit (Medicare Advantage)     Diabetic retinal exam     Diabetic Alb to Cr ratio (uACR) test     Colorectal Cancer Screen        Wt Readings from Last 3 Encounters:   24 65 kg (143 lb 3.2 oz)   24 64.9 kg (143 lb)   24 64.9 kg (143 lb)     Temp Readings from Last 3 Encounters:   24 98.1 °F (36.7 °C) (Oral)   24 97.8 °F (36.6 °C) (Oral)   05/10/23 98.4 °F (36.9 °C) (Oral)     BP Readings from Last 3 Encounters:   24 117/77   24 131/78   07/10/23 134/78     Pulse Readings from Last 3 Encounters:   24 77   24 77   07/10/23 75          No data to display                  Learning Assessment:  :         2024    11:30 AM   [facility] AMB LEARNING ASSESSMENT   Primary Learner Patient   level of education GRADUATED HIGH SCHOOL OR GED   Primary Language ENGLISH   Learning Preference DEMONSTRATION   Answered By Patient   Relationship to Learner SELF       Fall Risk Assessment:  :         2024     2:07 PM 2024     1:59 PM 2024    11:47 AM 2022     2:18 PM 2021    11:12 AM 2021     1:27 PM

## 2024-06-29 PROBLEM — R29.6 REPEATED FALLS: Status: RESOLVED | Noted: 2021-06-08 | Resolved: 2024-06-29

## 2024-07-05 DIAGNOSIS — E10.42 TYPE 1 DIABETES MELLITUS WITH DIABETIC POLYNEUROPATHY (HCC): ICD-10-CM

## 2024-07-05 NOTE — TELEPHONE ENCOUNTER
Future Appointments:  Future Appointments   Date Time Provider Department Center   8/16/2024  1:30 PM Concetta Mendoza MD [facility] BS AMB   10/29/2024  2:20 PM Celia Bird MD [facility] BS AMB        Last Appointment With Me:  6/28/2024     Requested Prescriptions     Pending Prescriptions Disp Refills    gabapentin (NEURONTIN) 300 MG capsule [Pharmacy Med Name: GABAPENTIN 300 MG CAPSULE] 90 capsule 2     Sig: Take 1 capsule by mouth 3 times daily for 90 days. Max Daily Amount: 900 mg

## 2024-07-06 RX ORDER — GABAPENTIN 300 MG/1
300 CAPSULE ORAL 3 TIMES DAILY
Qty: 90 CAPSULE | Refills: 2 | Status: SHIPPED | OUTPATIENT
Start: 2024-07-06 | End: 2024-10-04

## 2024-07-12 DIAGNOSIS — F33.1 MODERATE EPISODE OF RECURRENT MAJOR DEPRESSIVE DISORDER (HCC): ICD-10-CM

## 2024-07-12 DIAGNOSIS — F51.04 CHRONIC INSOMNIA: ICD-10-CM

## 2024-07-12 RX ORDER — QUETIAPINE FUMARATE 50 MG/1
50 TABLET, EXTENDED RELEASE ORAL NIGHTLY
Qty: 90 TABLET | Refills: 1 | Status: SHIPPED | OUTPATIENT
Start: 2024-07-12

## 2024-07-12 NOTE — TELEPHONE ENCOUNTER
PCP: Celia Bird MD     Last appt:  6/28/2024      Future Appointments   Date Time Provider Department Center   8/16/2024  1:30 PM Concetta Mendoza MD [facility] BS [facility]   10/29/2024  2:20 PM Celia Bird MD [facility] AMB          Requested Prescriptions     Pending Prescriptions Disp Refills    QUEtiapine (SEROQUEL XR) 50 MG extended release tablet [Pharmacy Med Name: QUETIAPINE ER 50 MG TABLET] 90 tablet 0     Sig: TAKE 1 TABLET BY MOUTH EVERY DAY AT NIGHT

## 2024-07-23 LAB — NONINV COLON CA DNA+OCC BLD SCRN STL QL: NEGATIVE

## 2024-08-15 NOTE — PROGRESS NOTES
Chief Complaint   Patient presents with    Diabetes     Type 1 diabetes mellitus with hyperglycemia (HCC)       Patient was last seen: 6 months ago 2/13/2024     General:   Medicare/medicaid    No new issues      Has chronic pain all over      c-peptide is low     Worked with [employer] [facility]      Depression and stress affect sugars     A1c: last a1c was 6.9    DM Medications:     Novolin 70/30 insulin: 38 units breakfast, 17 units with dinner (rotates injection sites) - gives right before eating    Last Changes: :none    Sugar Checks: checks more than 4 times a day and uses [employer] CGM  30 day 141, 114, 155, 148 (138)      AM: reports:      PM: reports:          LOWs:  has low sugars - see CGM report       DIET: is working on it, enrolled in [facility] Diabetes [facility] in past    EXERCISE: exercise limited due to pain  some walking     HTN: on ARB, HTN followed by PCP     LIPIDS: on statin, lipids followed by PCP    RENAL: has normal renal function, has normal JEAN PAUL-r , in the past year, is on an ARB     EYES: eye exam in past year, has no retinopathy     DENTAL:  has no teeth     FEET: sees podiatry, no numbness or tingling     HEART:  no chest pain, shortness of breath or claudication, has no cardiac history     ASA:  is on aspirin     SYMPTOMS: no polyuria, thirst or blurred vision     THYROID: no known thyroid issue    DIABETES HISTORY:    From 1st visit 12/16/2021   Type 1 Diabetic diagnosed 1976 near when had oldest child   On insulin since then; though at one point told didn't need   Can also not take 70/30 insulin in AM and not be tommy high by next dose which is odd   No other insulins except mixed insulin   Currently is free    LABS/STUDIES:     Lab Results   Component Value Date/Time    NUU2PNDZ 7.5 05/10/2023 02:00 PM    UJU5PQGS 7.8 11/04/2022 12:25 PM    PLW5GIXG 9.0 06/25/2021 01:40 PM    QZR7NZTD 9.0 10/21/2020 02:14 PM    QVX8ZHWU 8.8 01/14/2020 01:28 PM    RFZ6SSNW 9.4

## 2024-08-16 ENCOUNTER — OFFICE VISIT (OUTPATIENT)
Age: 74
End: 2024-08-16

## 2024-08-16 VITALS
WEIGHT: 144 LBS | OXYGEN SATURATION: 96 % | DIASTOLIC BLOOD PRESSURE: 85 MMHG | SYSTOLIC BLOOD PRESSURE: 124 MMHG | HEART RATE: 103 BPM | BODY MASS INDEX: 30.23 KG/M2 | HEIGHT: 58 IN

## 2024-08-16 DIAGNOSIS — E10.42 TYPE 1 DIABETES MELLITUS WITH DIABETIC POLYNEUROPATHY (HCC): Primary | ICD-10-CM

## 2024-08-16 DIAGNOSIS — E78.2 MIXED HYPERLIPIDEMIA: ICD-10-CM

## 2024-08-16 DIAGNOSIS — I10 ESSENTIAL (PRIMARY) HYPERTENSION: ICD-10-CM

## 2024-08-16 NOTE — PROGRESS NOTES
I have reviewed all needed documentation in preparation for visit. Verified patient by name and date of birth  Kierra Nunes is a 73 y.o. female Diabetes (Type 1 diabetes mellitus with hyperglycemia (HCC))  .    Vitals:    08/16/24 1328   BP: 124/85   Pulse: (!) 103   SpO2: 96%   Weight: 65.3 kg (144 lb)   Height: 1.473 m (4' 10\")       No LMP recorded. Patient is postmenopausal.         Health Maintenance Review: Patient reminded of \"due or due soon\" health maintenance. I have asked the patient to contact his/her primary care provider (PCP) for follow-up on his/her health maintenance.    \"Have you been to the ER, urgent care clinic since your last visit?  Hospitalized since your last visit?\"    NO    “Have you seen or consulted any other health care providers outside of [facility] since your last visit?”    NO

## 2024-10-10 DIAGNOSIS — E10.42 TYPE 1 DIABETES MELLITUS WITH DIABETIC POLYNEUROPATHY (HCC): ICD-10-CM

## 2024-10-12 RX ORDER — GABAPENTIN 300 MG/1
300 CAPSULE ORAL 3 TIMES DAILY
Qty: 90 CAPSULE | Refills: 2 | Status: SHIPPED | OUTPATIENT
Start: 2024-10-12 | End: 2025-01-10

## 2024-10-12 RX ORDER — GABAPENTIN 300 MG/1
300 CAPSULE ORAL 3 TIMES DAILY
Qty: 90 CAPSULE | Refills: 2 | Status: SHIPPED | OUTPATIENT
Start: 2024-10-12 | End: 2024-10-12 | Stop reason: SDUPTHER

## 2024-11-06 ENCOUNTER — OFFICE VISIT (OUTPATIENT)
Facility: CLINIC | Age: 74
End: 2024-11-06

## 2024-11-06 VITALS
RESPIRATION RATE: 16 BRPM | OXYGEN SATURATION: 97 % | HEART RATE: 72 BPM | SYSTOLIC BLOOD PRESSURE: 116 MMHG | WEIGHT: 144.8 LBS | TEMPERATURE: 97.9 F | BODY MASS INDEX: 30.39 KG/M2 | HEIGHT: 58 IN | DIASTOLIC BLOOD PRESSURE: 64 MMHG

## 2024-11-06 DIAGNOSIS — E66.9 OBESITY (BMI 30-39.9): ICD-10-CM

## 2024-11-06 DIAGNOSIS — I10 ESSENTIAL HYPERTENSION: Primary | ICD-10-CM

## 2024-11-06 DIAGNOSIS — E78.2 MIXED HYPERLIPIDEMIA: ICD-10-CM

## 2024-11-06 DIAGNOSIS — E55.9 VITAMIN D DEFICIENCY: ICD-10-CM

## 2024-11-06 DIAGNOSIS — E10.42 TYPE 1 DIABETES MELLITUS WITH DIABETIC POLYNEUROPATHY (HCC): ICD-10-CM

## 2024-11-06 RX ORDER — PHENTERMINE HYDROCHLORIDE 37.5 MG/1
37.5 TABLET ORAL
Qty: 30 TABLET | Refills: 0 | Status: SHIPPED | OUTPATIENT
Start: 2024-11-06 | End: 2024-12-06

## 2024-11-06 NOTE — PROGRESS NOTES
Chief Complaint   Patient presents with    Hypertension    Weight Management     History of Present Illness  The patient is a 74-year-old female who presents for a 4-month follow-up evaluation of hypertension and weight.    She reports no new health issues since her last visit. She is concerned about her weight, and is having a hard time losing weight, especially abdominal fat. She maintains a regular exercise routine, primarily walking for about 30 minutes weekly. She reports no chest pain, shortness of breath, dizziness, or leg swelling.    She had one epidural steroid injection for her back pain that did not help the pain. She has been managing her arthritis pain with Tylenol Arthritis 650 mg 2 tablets every 8 hrs and Celebrex, both of which have significantly improved her condition.     Patient Active Problem List   Diagnosis    Osteopenia    Allergic rhinitis    Essential hypertension    Vitamin D deficiency    Primary osteoarthritis involving multiple joints    Mild episode of recurrent major depressive disorder (HCC)    Obesity (BMI 30-39.9)    Mixed hyperlipidemia    Type 1 diabetes mellitus with diabetic polyneuropathy (HCC)     Past Medical History:   Diagnosis Date    Arthritis     Chronic left shoulder pain 5/10/2017    Chronic pain syndrome 9/27/2017    Cross-eye     Depression     Diabetes (HCC)     Type 1    Flatulence     Hypercholesterolemia     Hypertension     Menopause     LMP-58 years old?    Repeated falls 06/08/2021    Sinus congestion     Traumatic complete tear of right rotator cuff 9/24/2018 9/20/18, Dr. Hankins: probable R torn rotator cuff. MRI ordered. MRI 11/14/18: complete tear of supraspinatus tendon. 2/28/19, Dr. Rhoda Mosher, right shoulder arthroscopy, rotator cuff repair. 7/1/19, Dr. Mosher: repair is intact. Has glenohumeral OA at right shoulder causing stiffness and soreness, given cortisone shot     Allergies   Allergen Reactions    Naproxen Other (See Comments)

## 2024-11-30 DIAGNOSIS — E10.42 TYPE 1 DIABETES MELLITUS WITH DIABETIC POLYNEUROPATHY (HCC): ICD-10-CM

## 2024-12-02 RX ORDER — PEN NEEDLE, DIABETIC 29 G X1/2"
NEEDLE, DISPOSABLE MISCELLANEOUS
Qty: 100 EACH | Refills: 5 | Status: SHIPPED | OUTPATIENT
Start: 2024-12-02

## 2024-12-02 NOTE — TELEPHONE ENCOUNTER
Future Appointments:  Future Appointments   Date Time Provider Department Center   2/17/2025  1:30 PM Concetta Mendoza MD E [facility] BS [facility]   2/20/2025 11:10 AM Celia Bird MD [facility] DEP        Last Appointment With Me:  11/6/2024     Requested Prescriptions     Pending Prescriptions Disp Refills    BD INSULIN SYRINGE U/F 31G X 5/16\" 0.5 ML MISC [Pharmacy Med Name: BD INS SYRNG UF 0.5 ML [id number]]  5     Sig: TO INJECT INSULIN TWICE DAILY. DX: E10.42

## 2025-01-09 DIAGNOSIS — E10.42 TYPE 1 DIABETES MELLITUS WITH DIABETIC POLYNEUROPATHY (HCC): ICD-10-CM

## 2025-01-16 DIAGNOSIS — E10.42 TYPE 1 DIABETES MELLITUS WITH DIABETIC POLYNEUROPATHY (HCC): ICD-10-CM

## 2025-01-23 DIAGNOSIS — F51.04 CHRONIC INSOMNIA: ICD-10-CM

## 2025-01-23 DIAGNOSIS — F33.1 MODERATE EPISODE OF RECURRENT MAJOR DEPRESSIVE DISORDER (HCC): ICD-10-CM

## 2025-01-23 RX ORDER — QUETIAPINE FUMARATE 50 MG/1
50 TABLET, EXTENDED RELEASE ORAL NIGHTLY
Qty: 90 TABLET | Refills: 1 | Status: SHIPPED | OUTPATIENT
Start: 2025-01-23

## 2025-01-23 NOTE — TELEPHONE ENCOUNTER
PCP: Celia Bird MD     Last appt:  11/6/2024      Future Appointments   Date Time Provider Department Center   2/17/2025  1:30 PM Concetta Mendoza MD [facility]   2/20/2025 11:10 AM Celia Bird MD [facility]          Requested Prescriptions     Pending Prescriptions Disp Refills    QUEtiapine (SEROQUEL XR) 50 MG extended release tablet [Pharmacy Med Name: QUETIAPINE ER 50 MG TABLET] 90 tablet 1     Sig: TAKE 1 TABLET BY MOUTH EVERY DAY AT NIGHT

## 2025-02-17 ENCOUNTER — TELEPHONE (OUTPATIENT)
Facility: CLINIC | Age: 75
End: 2025-02-17

## 2025-02-17 NOTE — TELEPHONE ENCOUNTER
Attempted to contact patient regarding upcoming Medicare wellness appointment and completion of HRA questionnaire. LVM for patient to please return call at  [phone].

## 2025-02-24 ENCOUNTER — TELEPHONE (OUTPATIENT)
Facility: CLINIC | Age: 75
End: 2025-02-24

## 2025-02-24 NOTE — TELEPHONE ENCOUNTER
----- Message from Bertin STARK sent at 2/24/2025  2:46 PM EST -----  Regarding: E[facility] Appointment Request  E[facility] Appointment Request    Patient needs appointment for E[facility] Appointment Type: Annual Visit.    Patient Requested Dates(s): Tuesday   Patient Requested Time: 1pm or 2pm  Provider Name: Celia Bird    Reason for Appointment Request: Established Patient - No appointments available during search  --------------------------------------------------------------------------------------------------------------------------    Relationship to Patient: Self     Call Back Information: OK to leave message on voicemail  Preferred Call Back Number: Phone [phone]

## 2025-03-17 DIAGNOSIS — E10.42 TYPE 1 DIABETES MELLITUS WITH DIABETIC POLYNEUROPATHY (HCC): ICD-10-CM

## 2025-03-17 RX ORDER — INSULIN HUMAN 100 [IU]/ML
INJECTION, SUSPENSION SUBCUTANEOUS
Qty: 50 ML | Refills: 2 | Status: SHIPPED | OUTPATIENT
Start: 2025-03-17

## 2025-04-25 DIAGNOSIS — E10.42 TYPE 1 DIABETES MELLITUS WITH DIABETIC POLYNEUROPATHY (HCC): ICD-10-CM

## 2025-04-25 RX ORDER — GABAPENTIN 300 MG/1
300 CAPSULE ORAL 3 TIMES DAILY
Qty: 90 CAPSULE | Refills: 5 | Status: SHIPPED | OUTPATIENT
Start: 2025-04-25 | End: 2025-10-22

## 2025-04-25 NOTE — TELEPHONE ENCOUNTER
PCP: Celia Bird MD     Last appt:  11/6/2024      Future Appointments   Date Time Provider Department Center   7/8/2025  1:20 PM Celia Bird MD [facility]   8/12/2025  1:30 PM Concetta Mendoza MD [facility]          Requested Prescriptions     Pending Prescriptions Disp Refills    gabapentin (NEURONTIN) 300 MG capsule [Pharmacy Med Name: GABAPENTIN 300 MG CAPSULE] 90 capsule 0     Sig: TAKE 1 CAPSULE BY MOUTH 3 TIMES DAILY FOR 90 DAYS. MAX DAILY AMOUNT: 900 MG

## 2025-05-04 DIAGNOSIS — I10 ESSENTIAL (PRIMARY) HYPERTENSION: ICD-10-CM

## 2025-05-05 RX ORDER — IRBESARTAN 150 MG/1
150 TABLET ORAL DAILY
Qty: 90 TABLET | Refills: 3 | Status: SHIPPED | OUTPATIENT
Start: 2025-05-05

## 2025-05-05 NOTE — TELEPHONE ENCOUNTER
PCP: Celia Bird MD     Last appt:  11/6/2024      Future Appointments   Date Time Provider Department Center   7/8/2025  1:20 PM Celia Bird MD [facility]   8/12/2025  1:30 PM Concetta Mendoza MD [facility]          Requested Prescriptions     Pending Prescriptions Disp Refills    irbesartan (AVAPRO) 150 MG tablet [Pharmacy Med Name: IRBESARTAN 150 MG TABLET] 90 tablet 3     Sig: TAKE 1 TABLET BY MOUTH EVERY DAY

## 2025-06-06 LAB
HBA1C MFR BLD HPLC: 6.8 %
HBA1C MFR BLD HPLC: 6.8 %

## 2025-07-01 ENCOUNTER — CLINICAL DOCUMENTATION (OUTPATIENT)
Facility: CLINIC | Age: 75
End: 2025-07-01

## 2025-07-03 ENCOUNTER — TELEPHONE (OUTPATIENT)
Facility: CLINIC | Age: 75
End: 2025-07-03

## 2025-07-03 NOTE — TELEPHONE ENCOUNTER
Attempted to contact patient regarding upcoming Medicare wellness appointment and completion of HRA questionnaire. LVM for patient to please return call at  [phone].

## 2025-07-08 ENCOUNTER — OFFICE VISIT (OUTPATIENT)
Facility: CLINIC | Age: 75
End: 2025-07-08
Payer: MEDICARE

## 2025-07-08 VITALS
WEIGHT: 146.6 LBS | HEART RATE: 78 BPM | TEMPERATURE: 98.8 F | BODY MASS INDEX: 30.77 KG/M2 | SYSTOLIC BLOOD PRESSURE: 136 MMHG | RESPIRATION RATE: 16 BRPM | OXYGEN SATURATION: 98 % | HEIGHT: 58 IN | DIASTOLIC BLOOD PRESSURE: 74 MMHG

## 2025-07-08 DIAGNOSIS — M79.672 BILATERAL FOOT PAIN: ICD-10-CM

## 2025-07-08 DIAGNOSIS — E55.9 VITAMIN D DEFICIENCY: ICD-10-CM

## 2025-07-08 DIAGNOSIS — M79.671 BILATERAL FOOT PAIN: ICD-10-CM

## 2025-07-08 DIAGNOSIS — E78.2 MIXED HYPERLIPIDEMIA: ICD-10-CM

## 2025-07-08 DIAGNOSIS — Z00.00 MEDICARE ANNUAL WELLNESS VISIT, SUBSEQUENT: Primary | ICD-10-CM

## 2025-07-08 DIAGNOSIS — I10 ESSENTIAL HYPERTENSION: ICD-10-CM

## 2025-07-08 DIAGNOSIS — E10.42 TYPE 1 DIABETES MELLITUS WITH DIABETIC POLYNEUROPATHY (HCC): ICD-10-CM

## 2025-07-08 DIAGNOSIS — Z12.31 ENCOUNTER FOR SCREENING MAMMOGRAM FOR MALIGNANT NEOPLASM OF BREAST: ICD-10-CM

## 2025-07-08 PROCEDURE — 99214 OFFICE O/P EST MOD 30 MIN: CPT | Performed by: INTERNAL MEDICINE

## 2025-07-08 PROCEDURE — G8417 CALC BMI ABV UP PARAM F/U: HCPCS | Performed by: INTERNAL MEDICINE

## 2025-07-08 PROCEDURE — 3046F HEMOGLOBIN A1C LEVEL >9.0%: CPT | Performed by: INTERNAL MEDICINE

## 2025-07-08 PROCEDURE — 1090F PRES/ABSN URINE INCON ASSESS: CPT | Performed by: INTERNAL MEDICINE

## 2025-07-08 PROCEDURE — 1126F AMNT PAIN NOTED NONE PRSNT: CPT | Performed by: INTERNAL MEDICINE

## 2025-07-08 PROCEDURE — G8399 PT W/DXA RESULTS DOCUMENT: HCPCS | Performed by: INTERNAL MEDICINE

## 2025-07-08 PROCEDURE — 1160F RVW MEDS BY RX/DR IN RCRD: CPT | Performed by: INTERNAL MEDICINE

## 2025-07-08 PROCEDURE — 3075F SYST BP GE 130 - 139MM HG: CPT | Performed by: INTERNAL MEDICINE

## 2025-07-08 PROCEDURE — 1036F TOBACCO NON-USER: CPT | Performed by: INTERNAL MEDICINE

## 2025-07-08 PROCEDURE — 2022F DILAT RTA XM EVC RTNOPTHY: CPT | Performed by: INTERNAL MEDICINE

## 2025-07-08 PROCEDURE — 3017F COLORECTAL CA SCREEN DOC REV: CPT | Performed by: INTERNAL MEDICINE

## 2025-07-08 PROCEDURE — G8427 DOCREV CUR MEDS BY ELIG CLIN: HCPCS | Performed by: INTERNAL MEDICINE

## 2025-07-08 PROCEDURE — 3044F HG A1C LEVEL LT 7.0%: CPT | Performed by: INTERNAL MEDICINE

## 2025-07-08 PROCEDURE — 1123F ACP DISCUSS/DSCN MKR DOCD: CPT | Performed by: INTERNAL MEDICINE

## 2025-07-08 PROCEDURE — G0439 PPPS, SUBSEQ VISIT: HCPCS | Performed by: INTERNAL MEDICINE

## 2025-07-08 PROCEDURE — 1159F MED LIST DOCD IN RCRD: CPT | Performed by: INTERNAL MEDICINE

## 2025-07-08 PROCEDURE — 3078F DIAST BP <80 MM HG: CPT | Performed by: INTERNAL MEDICINE

## 2025-07-08 SDOH — ECONOMIC STABILITY: FOOD INSECURITY: WITHIN THE PAST 12 MONTHS, THE FOOD YOU BOUGHT JUST DIDN'T LAST AND YOU DIDN'T HAVE MONEY TO GET MORE.: NEVER TRUE

## 2025-07-08 SDOH — ECONOMIC STABILITY: FOOD INSECURITY: WITHIN THE PAST 12 MONTHS, YOU WORRIED THAT YOUR FOOD WOULD RUN OUT BEFORE YOU GOT MONEY TO BUY MORE.: NEVER TRUE

## 2025-07-08 ASSESSMENT — PATIENT HEALTH QUESTIONNAIRE - PHQ9
SUM OF ALL RESPONSES TO PHQ QUESTIONS 1-9: 0
1. LITTLE INTEREST OR PLEASURE IN DOING THINGS: NOT AT ALL
9. THOUGHTS THAT YOU WOULD BE BETTER OFF DEAD, OR OF HURTING YOURSELF: NOT AT ALL
SUM OF ALL RESPONSES TO PHQ QUESTIONS 1-9: 0
10. IF YOU CHECKED OFF ANY PROBLEMS, HOW DIFFICULT HAVE THESE PROBLEMS MADE IT FOR YOU TO DO YOUR WORK, TAKE CARE OF THINGS AT HOME, OR GET ALONG WITH OTHER PEOPLE: NOT DIFFICULT AT ALL
SUM OF ALL RESPONSES TO PHQ QUESTIONS 1-9: 0
4. FEELING TIRED OR HAVING LITTLE ENERGY: NOT AT ALL
7. TROUBLE CONCENTRATING ON THINGS, SUCH AS READING THE NEWSPAPER OR WATCHING TELEVISION: NOT AT ALL
SUM OF ALL RESPONSES TO PHQ QUESTIONS 1-9: 0
5. POOR APPETITE OR OVEREATING: NOT AT ALL
8. MOVING OR SPEAKING SO SLOWLY THAT OTHER PEOPLE COULD HAVE NOTICED. OR THE OPPOSITE, BEING SO FIGETY OR RESTLESS THAT YOU HAVE BEEN MOVING AROUND A LOT MORE THAN USUAL: NOT AT ALL
2. FEELING DOWN, DEPRESSED OR HOPELESS: NOT AT ALL
6. FEELING BAD ABOUT YOURSELF - OR THAT YOU ARE A FAILURE OR HAVE LET YOURSELF OR YOUR FAMILY DOWN: NOT AT ALL
3. TROUBLE FALLING OR STAYING ASLEEP: NOT AT ALL

## 2025-07-08 ASSESSMENT — LIFESTYLE VARIABLES
HOW MANY STANDARD DRINKS CONTAINING ALCOHOL DO YOU HAVE ON A TYPICAL DAY: PATIENT DOES NOT DRINK
HOW OFTEN DO YOU HAVE A DRINK CONTAINING ALCOHOL: NEVER

## 2025-07-08 NOTE — PROGRESS NOTES
Medicare Annual Wellness Visit    Kierra Nunes is here for Medicare AWV    Assessment & Plan  1. Medicare annual wellness visit - BP within normal range, weight increased  - Provide fall prevention strategies  - Consider installing grab bars in shower/tub  Non-fasting labs today:  - CBC with Auto Differential  - Comprehensive Metabolic Panel  - Albumin/Creatinine Ratio, Urine  - Lipid Panel  - TSH  - Vitamin D 25 Hydroxy    2. Hypertension - BP within normal range  - No new symptoms  - Continue current medication  - Monitor BP regularly    3. Type 1 diabetes - Controlled. Last A1c 6.8% on 6/6/25.   - Continue current management  - Follow up as scheduled with Dr. Mendoza.    4. Hyperlipidemia - Controlled.  Lab Results   Component Value Date    LDL 68.8 02/09/2024   - Continue Crestor 20 mg nightly.    5. Foot pain - Significant foot pain. Has severe bunion bilaterally with hammertoes at 2nd toes bilaterally.  - XR FOOT LIMITED BILATERAL; Future  - Follow up with Dr. Gillette    6. Frozen shoulder (capsulitis) vs rotator cuff arthropathy - Right shoulder stiffness, left shoulder very limited  - Declined cortisone shots  - No new treatment plan  - Monitor symptoms    7. Constipation - Occasional difficulty with bowel movements  - Ensure adequate intake of fruits, vegetables, and water  - No new treatment plan  - Monitor bowel movements    8. Weight management - Weight increased, patient concerned about weight gain  - Maintain healthy diet and regular physical activity    9. Health maintenance - Schedule eye doctor appointment if not seen recently  - LE DIGITAL SCREEN W OR WO CAD BILATERAL; Future       Return in about 6 months (around 1/8/2026), or if symptoms worsen or fail to improve, for HTN. OA.       Subjective   History of Present Illness  The patient presents for a Medicare annual wellness visit and a 6-month follow-up for type 2 diabetes and hypertension.    She reports severe foot pain, diagnosed with

## 2025-07-08 NOTE — PROGRESS NOTES
Kierra Nunes  Identified pt with two pt identifiers(name and ).  Chief Complaint   Patient presents with    Medicare AWV       1. Have you been to the ER, urgent care clinic since your last visit?  Hospitalized since your last visit? NO    2. Have you seen or consulted any other health care providers outside of the [facility] since your last visit?  Include any pap smears or colon screening. Pt goes to [facility] and has apt coming up.       Provider notified of reason for visit, vitals and flowsheets obtained on patients.     Patient received paperwork for advance directive during previous visit but has not completed at this time     Reviewed record In preparation for visit, huddled with provider and have obtained necessary documentation      Health Maintenance Due   Topic    Diabetic retinal exam     Annual Wellness Visit (Medicare Advantage)     Lipids     Diabetic foot exam     Diabetic Alb to Cr ratio (uACR) test     GFR test (Diabetes, CKD 3-4, OR last GFR 15-59)     Breast cancer screen        Wt Readings from Last 3 Encounters:   25 66.5 kg (146 lb 9.6 oz)   24 65.7 kg (144 lb 12.8 oz)   10/10/24 65.3 kg (144 lb)     Temp Readings from Last 3 Encounters:   25 98.8 °F (37.1 °C) (Temporal)   24 97.9 °F (36.6 °C) (Oral)   24 98.1 °F (36.7 °C) (Oral)     BP Readings from Last 3 Encounters:   25 136/74   24 116/64   24 124/85     Pulse Readings from Last 3 Encounters:   25 78   24 72   24 (!) 103          No data to display                  Learning Assessment:  :         2024    11:30 AM   [facility] AMB LEARNING ASSESSMENT   Primary Learner Patient   level of education GRADUATED HIGH SCHOOL OR GED   Primary Language ENGLISH   Learning Preference DEMONSTRATION   Answered By Patient   Relationship to Learner SELF       Fall Risk Assessment:  :         2025     1:24 PM 10/10/2024     3:26 PM 2024     2:07 PM 2024

## 2025-07-09 LAB
25(OH)D3 SERPL-MCNC: 49.3 NG/ML (ref 30–100)
ALBUMIN SERPL-MCNC: 4.5 G/DL (ref 3.5–5)
ALBUMIN/GLOB SERPL: 1.6 (ref 1.1–2.2)
ALP SERPL-CCNC: 105 U/L (ref 45–117)
ALT SERPL-CCNC: 52 U/L (ref 12–78)
ANION GAP SERPL CALC-SCNC: 1 MMOL/L (ref 2–12)
AST SERPL-CCNC: 43 U/L (ref 15–37)
BASOPHILS # BLD: 0.05 K/UL (ref 0–0.1)
BASOPHILS NFR BLD: 0.6 % (ref 0–1)
BILIRUB SERPL-MCNC: 0.8 MG/DL (ref 0.2–1)
BUN SERPL-MCNC: 22 MG/DL (ref 6–20)
BUN/CREAT SERPL: 24 (ref 12–20)
CALCIUM SERPL-MCNC: 10.4 MG/DL (ref 8.5–10.1)
CHLORIDE SERPL-SCNC: 106 MMOL/L (ref 97–108)
CHOLEST SERPL-MCNC: 144 MG/DL
CO2 SERPL-SCNC: 33 MMOL/L (ref 21–32)
CREAT SERPL-MCNC: 0.9 MG/DL (ref 0.55–1.02)
CREAT UR-MCNC: 47.4 MG/DL
DIFFERENTIAL METHOD BLD: NORMAL
EOSINOPHIL # BLD: 0.23 K/UL (ref 0–0.4)
EOSINOPHIL NFR BLD: 2.9 % (ref 0–7)
ERYTHROCYTE [DISTWIDTH] IN BLOOD BY AUTOMATED COUNT: 12.3 % (ref 11.5–14.5)
GLOBULIN SER CALC-MCNC: 2.9 G/DL (ref 2–4)
GLUCOSE SERPL-MCNC: 74 MG/DL (ref 65–100)
HCT VFR BLD AUTO: 42 % (ref 35–47)
HDLC SERPL-MCNC: 65 MG/DL
HDLC SERPL: 2.2 (ref 0–5)
HGB BLD-MCNC: 13.3 G/DL (ref 11.5–16)
IMM GRANULOCYTES # BLD AUTO: 0.01 K/UL (ref 0–0.04)
IMM GRANULOCYTES NFR BLD AUTO: 0.1 % (ref 0–0.5)
LDLC SERPL CALC-MCNC: 65.4 MG/DL (ref 0–100)
LYMPHOCYTES # BLD: 2.61 K/UL (ref 0.8–3.5)
LYMPHOCYTES NFR BLD: 33.3 % (ref 12–49)
MCH RBC QN AUTO: 31 PG (ref 26–34)
MCHC RBC AUTO-ENTMCNC: 31.7 G/DL (ref 30–36.5)
MCV RBC AUTO: 97.9 FL (ref 80–99)
MICROALBUMIN UR-MCNC: <0.5 MG/DL
MICROALBUMIN/CREAT UR-RTO: <11 MG/G (ref 0–30)
MONOCYTES # BLD: 0.77 K/UL (ref 0–1)
MONOCYTES NFR BLD: 9.8 % (ref 5–13)
NEUTS SEG # BLD: 4.16 K/UL (ref 1.8–8)
NEUTS SEG NFR BLD: 53.3 % (ref 32–75)
NRBC # BLD: 0 K/UL (ref 0–0.01)
NRBC BLD-RTO: 0 PER 100 WBC
PLATELET # BLD AUTO: 214 K/UL (ref 150–400)
PMV BLD AUTO: 11.9 FL (ref 8.9–12.9)
POTASSIUM SERPL-SCNC: 4 MMOL/L (ref 3.5–5.1)
PROT SERPL-MCNC: 7.4 G/DL (ref 6.4–8.2)
RBC # BLD AUTO: 4.29 M/UL (ref 3.8–5.2)
SODIUM SERPL-SCNC: 140 MMOL/L (ref 136–145)
SPECIMEN HOLD: NORMAL
TRIGL SERPL-MCNC: 68 MG/DL
TSH SERPL DL<=0.05 MIU/L-ACNC: 1.16 UIU/ML (ref 0.36–3.74)
VLDLC SERPL CALC-MCNC: 13.6 MG/DL
WBC # BLD AUTO: 7.8 K/UL (ref 3.6–11)

## 2025-07-17 ENCOUNTER — RESULTS FOLLOW-UP (OUTPATIENT)
Facility: CLINIC | Age: 75
End: 2025-07-17

## 2025-07-27 DIAGNOSIS — F51.04 CHRONIC INSOMNIA: ICD-10-CM

## 2025-07-27 DIAGNOSIS — F33.1 MODERATE EPISODE OF RECURRENT MAJOR DEPRESSIVE DISORDER (HCC): ICD-10-CM

## 2025-07-28 RX ORDER — QUETIAPINE FUMARATE 50 MG/1
50 TABLET, EXTENDED RELEASE ORAL NIGHTLY
Qty: 90 TABLET | Refills: 0 | Status: SHIPPED | OUTPATIENT
Start: 2025-07-28

## 2025-07-28 NOTE — TELEPHONE ENCOUNTER
Future Appointments:  Future Appointments   Date Time Provider Department Center   8/12/2025  1:30 PM Concetta Mendoza MD RDE [facility] BS [facility]   10/7/2025  2:20 PM Ailyn Mendes PA [facility] BS AMB   1/12/2026  1:20 PM Celia Bird MD [facility] DEP        Last Appointment With Me:  7/8/2025     Requested Prescriptions     Pending Prescriptions Disp Refills    QUEtiapine (SEROQUEL XR) 50 MG extended release tablet [Pharmacy Med Name: QUETIAPINE ER 50 MG TABLET] 90 tablet 1     Sig: TAKE 1 TABLET BY MOUTH EVERY DAY AT NIGHT

## 2025-08-12 ENCOUNTER — OFFICE VISIT (OUTPATIENT)
Age: 75
End: 2025-08-12
Payer: MEDICARE

## 2025-08-12 VITALS — WEIGHT: 147 LBS | HEIGHT: 58 IN | BODY MASS INDEX: 30.86 KG/M2

## 2025-08-12 DIAGNOSIS — E10.65 TYPE 1 DIABETES MELLITUS WITH HYPERGLYCEMIA (HCC): ICD-10-CM

## 2025-08-12 DIAGNOSIS — E78.2 MIXED HYPERLIPIDEMIA: ICD-10-CM

## 2025-08-12 DIAGNOSIS — I10 ESSENTIAL (PRIMARY) HYPERTENSION: Primary | ICD-10-CM

## 2025-08-12 DIAGNOSIS — E10.42 TYPE 1 DIABETES MELLITUS WITH DIABETIC POLYNEUROPATHY (HCC): ICD-10-CM

## 2025-08-12 PROCEDURE — G2211 COMPLEX E/M VISIT ADD ON: HCPCS | Performed by: INTERNAL MEDICINE

## 2025-08-12 PROCEDURE — 1123F ACP DISCUSS/DSCN MKR DOCD: CPT | Performed by: INTERNAL MEDICINE

## 2025-08-12 PROCEDURE — G8399 PT W/DXA RESULTS DOCUMENT: HCPCS | Performed by: INTERNAL MEDICINE

## 2025-08-12 PROCEDURE — G8428 CUR MEDS NOT DOCUMENT: HCPCS | Performed by: INTERNAL MEDICINE

## 2025-08-12 PROCEDURE — 3044F HG A1C LEVEL LT 7.0%: CPT | Performed by: INTERNAL MEDICINE

## 2025-08-12 PROCEDURE — 2022F DILAT RTA XM EVC RTNOPTHY: CPT | Performed by: INTERNAL MEDICINE

## 2025-08-12 PROCEDURE — 95251 CONT GLUC MNTR ANALYSIS I&R: CPT | Performed by: INTERNAL MEDICINE

## 2025-08-12 PROCEDURE — 3017F COLORECTAL CA SCREEN DOC REV: CPT | Performed by: INTERNAL MEDICINE

## 2025-08-12 PROCEDURE — 1036F TOBACCO NON-USER: CPT | Performed by: INTERNAL MEDICINE

## 2025-08-12 PROCEDURE — 3046F HEMOGLOBIN A1C LEVEL >9.0%: CPT | Performed by: INTERNAL MEDICINE

## 2025-08-12 PROCEDURE — 1090F PRES/ABSN URINE INCON ASSESS: CPT | Performed by: INTERNAL MEDICINE

## 2025-08-12 PROCEDURE — G8417 CALC BMI ABV UP PARAM F/U: HCPCS | Performed by: INTERNAL MEDICINE

## 2025-08-12 PROCEDURE — 99214 OFFICE O/P EST MOD 30 MIN: CPT | Performed by: INTERNAL MEDICINE

## 2025-08-12 RX ORDER — INSULIN HUMAN 100 [IU]/ML
INJECTION, SUSPENSION SUBCUTANEOUS
Qty: 50 ML | Refills: 2 | Status: SHIPPED | OUTPATIENT
Start: 2025-08-12 | End: 2025-08-12

## 2025-08-12 RX ORDER — INSULIN HUMAN 100 [IU]/ML
INJECTION, SUSPENSION SUBCUTANEOUS
Qty: 50 ML | Refills: 3 | Status: SHIPPED | OUTPATIENT
Start: 2025-08-12

## 2025-08-12 RX ORDER — BLOOD-GLUCOSE SENSOR
EACH MISCELLANEOUS
Qty: 6 EACH | Refills: 3 | Status: SHIPPED | OUTPATIENT
Start: 2025-08-12

## 2025-08-12 RX ORDER — CETIRIZINE HYDROCHLORIDE 10 MG/1
10 TABLET ORAL DAILY
COMMUNITY

## 2025-08-28 RX ORDER — ROSUVASTATIN CALCIUM 20 MG/1
20 TABLET, COATED ORAL
Qty: 100 TABLET | Refills: 3 | Status: SHIPPED | OUTPATIENT
Start: 2025-08-28